# Patient Record
Sex: MALE | Race: WHITE | NOT HISPANIC OR LATINO | Employment: OTHER | URBAN - METROPOLITAN AREA
[De-identification: names, ages, dates, MRNs, and addresses within clinical notes are randomized per-mention and may not be internally consistent; named-entity substitution may affect disease eponyms.]

---

## 2023-05-23 ENCOUNTER — HOSPITAL ENCOUNTER (INPATIENT)
Facility: MEDICAL CENTER | Age: 68
LOS: 6 days | DRG: 246 | End: 2023-05-29
Attending: EMERGENCY MEDICINE | Admitting: INTERNAL MEDICINE
Payer: MEDICARE

## 2023-05-23 ENCOUNTER — APPOINTMENT (OUTPATIENT)
Dept: RADIOLOGY | Facility: MEDICAL CENTER | Age: 68
DRG: 246 | End: 2023-05-23
Attending: INTERNAL MEDICINE
Payer: MEDICARE

## 2023-05-23 ENCOUNTER — APPOINTMENT (OUTPATIENT)
Dept: CARDIOLOGY | Facility: MEDICAL CENTER | Age: 68
DRG: 246 | End: 2023-05-23
Attending: EMERGENCY MEDICINE
Payer: MEDICARE

## 2023-05-23 ENCOUNTER — APPOINTMENT (OUTPATIENT)
Dept: RADIOLOGY | Facility: MEDICAL CENTER | Age: 68
DRG: 246 | End: 2023-05-23
Attending: EMERGENCY MEDICINE
Payer: MEDICARE

## 2023-05-23 DIAGNOSIS — I46.9 CARDIAC ARREST (HCC): ICD-10-CM

## 2023-05-23 DIAGNOSIS — I25.5 ISCHEMIC CARDIOMYOPATHY: ICD-10-CM

## 2023-05-23 DIAGNOSIS — G89.29 OTHER CHRONIC PAIN: ICD-10-CM

## 2023-05-23 DIAGNOSIS — I48.0 PAROXYSMAL ATRIAL FIBRILLATION (HCC): ICD-10-CM

## 2023-05-23 DIAGNOSIS — F41.9 ANXIETY: ICD-10-CM

## 2023-05-23 DIAGNOSIS — E78.00 HYPERCHOLESTEREMIA: ICD-10-CM

## 2023-05-23 DIAGNOSIS — I47.20 VT (VENTRICULAR TACHYCARDIA) (HCC): ICD-10-CM

## 2023-05-23 DIAGNOSIS — I21.11 ST ELEVATION MYOCARDIAL INFARCTION INVOLVING RIGHT CORONARY ARTERY (HCC): ICD-10-CM

## 2023-05-23 LAB
ACT BLD: 203 SEC (ref 74–137)
ACT BLD: 305 SEC (ref 74–137)
ALBUMIN SERPL BCP-MCNC: 4.2 G/DL (ref 3.2–4.9)
ALBUMIN/GLOB SERPL: 1.6 G/DL
ALP SERPL-CCNC: 67 U/L (ref 30–99)
ALT SERPL-CCNC: 69 U/L (ref 2–50)
ANION GAP SERPL CALC-SCNC: 15 MMOL/L (ref 7–16)
ANION GAP SERPL CALC-SCNC: 19 MMOL/L (ref 7–16)
APTT PPP: 28.1 SEC (ref 24.7–36)
AST SERPL-CCNC: 50 U/L (ref 12–45)
BASE EXCESS BLDA CALC-SCNC: -1 MMOL/L (ref -4–3)
BASE EXCESS BLDA CALC-SCNC: 0 MMOL/L (ref -4–3)
BASOPHILS # BLD AUTO: 0 % (ref 0–1.8)
BASOPHILS # BLD: 0 K/UL (ref 0–0.12)
BILIRUB SERPL-MCNC: 0.2 MG/DL (ref 0.1–1.5)
BODY TEMPERATURE: ABNORMAL DEGREES
BODY TEMPERATURE: ABNORMAL DEGREES
BREATHS SETTING VENT: 24
BREATHS SETTING VENT: 28
BUN SERPL-MCNC: 19 MG/DL (ref 8–22)
BUN SERPL-MCNC: 20 MG/DL (ref 8–22)
CALCIUM ALBUM COR SERPL-MCNC: 8.7 MG/DL (ref 8.5–10.5)
CALCIUM SERPL-MCNC: 8.4 MG/DL (ref 8.5–10.5)
CALCIUM SERPL-MCNC: 8.9 MG/DL (ref 8.5–10.5)
CHLORIDE SERPL-SCNC: 101 MMOL/L (ref 96–112)
CHLORIDE SERPL-SCNC: 99 MMOL/L (ref 96–112)
CO2 BLDA-SCNC: 24 MMOL/L (ref 20–33)
CO2 BLDA-SCNC: 28 MMOL/L (ref 20–33)
CO2 SERPL-SCNC: 21 MMOL/L (ref 20–33)
CO2 SERPL-SCNC: 24 MMOL/L (ref 20–33)
CREAT SERPL-MCNC: 0.78 MG/DL (ref 0.5–1.4)
CREAT SERPL-MCNC: 0.79 MG/DL (ref 0.5–1.4)
DELSYS IDSYS: ABNORMAL
DELSYS IDSYS: ABNORMAL
END TIDAL CARBON DIOXIDE IECO2: 25 MMHG
END TIDAL CARBON DIOXIDE IECO2: 34 MMHG
EOSINOPHIL # BLD AUTO: 0 K/UL (ref 0–0.51)
EOSINOPHIL NFR BLD: 0 % (ref 0–6.9)
ERYTHROCYTE [DISTWIDTH] IN BLOOD BY AUTOMATED COUNT: 49.1 FL (ref 35.9–50)
EST. AVERAGE GLUCOSE BLD GHB EST-MCNC: 111 MG/DL
GFR SERPLBLD CREATININE-BSD FMLA CKD-EPI: 96 ML/MIN/1.73 M 2
GFR SERPLBLD CREATININE-BSD FMLA CKD-EPI: 96 ML/MIN/1.73 M 2
GLOBULIN SER CALC-MCNC: 2.6 G/DL (ref 1.9–3.5)
GLUCOSE BLD STRIP.AUTO-MCNC: 145 MG/DL (ref 65–99)
GLUCOSE SERPL-MCNC: 143 MG/DL (ref 65–99)
GLUCOSE SERPL-MCNC: 162 MG/DL (ref 65–99)
HBA1C MFR BLD: 5.5 % (ref 4–5.6)
HCO3 BLDA-SCNC: 23.1 MMOL/L (ref 17–25)
HCO3 BLDA-SCNC: 26.8 MMOL/L (ref 17–25)
HCT VFR BLD AUTO: 45.8 % (ref 42–52)
HGB BLD-MCNC: 15.5 G/DL (ref 14–18)
HOROWITZ INDEX BLDA+IHG-RTO: 295 MM[HG]
HOROWITZ INDEX BLDA+IHG-RTO: 360 MM[HG]
INR PPP: 1.05 (ref 0.87–1.13)
LIPASE SERPL-CCNC: 27 U/L (ref 11–82)
LYMPHOCYTES # BLD AUTO: 4.23 K/UL (ref 1–4.8)
LYMPHOCYTES NFR BLD: 18 % (ref 22–41)
MAGNESIUM SERPL-MCNC: 2.3 MG/DL (ref 1.5–2.5)
MANUAL DIFF BLD: NORMAL
MCH RBC QN AUTO: 32.5 PG (ref 27–33)
MCHC RBC AUTO-ENTMCNC: 33.8 G/DL (ref 32.3–36.5)
MCV RBC AUTO: 96 FL (ref 81.4–97.8)
MODE IMODE: ABNORMAL
MODE IMODE: ABNORMAL
MONOCYTES # BLD AUTO: 2.11 K/UL (ref 0–0.85)
MONOCYTES NFR BLD AUTO: 9 % (ref 0–13.4)
MORPHOLOGY BLD-IMP: NORMAL
MYELOCYTES NFR BLD MANUAL: 1 %
NEUTROPHILS # BLD AUTO: 16.92 K/UL (ref 1.82–7.42)
NEUTROPHILS NFR BLD: 70 % (ref 44–72)
NEUTS BAND NFR BLD MANUAL: 2 % (ref 0–10)
NRBC # BLD AUTO: 0.02 K/UL
NRBC BLD-RTO: 0.1 /100 WBC (ref 0–0.2)
NT-PROBNP SERPL IA-MCNC: 344 PG/ML (ref 0–125)
O2/TOTAL GAS SETTING VFR VENT: 40 %
O2/TOTAL GAS SETTING VFR VENT: 60 %
PCO2 BLDA: 31.4 MMHG (ref 26–37)
PCO2 BLDA: 54.8 MMHG (ref 26–37)
PCO2 TEMP ADJ BLDA: 30 MMHG (ref 26–37)
PCO2 TEMP ADJ BLDA: 52.8 MMHG (ref 26–37)
PEEP END EXPIRATORY PRESSURE IPEEP: 8 CMH20
PEEP END EXPIRATORY PRESSURE IPEEP: 8 CMH20
PH BLDA: 7.3 [PH] (ref 7.4–7.5)
PH BLDA: 7.48 [PH] (ref 7.4–7.5)
PH TEMP ADJ BLDA: 7.31 [PH] (ref 7.4–7.5)
PH TEMP ADJ BLDA: 7.49 [PH] (ref 7.4–7.5)
PHOSPHATE SERPL-MCNC: 5 MG/DL (ref 2.5–4.5)
PLATELET # BLD AUTO: 347 K/UL (ref 164–446)
PLATELET BLD QL SMEAR: NORMAL
PMV BLD AUTO: 9.4 FL (ref 9–12.9)
PO2 BLDA: 118 MMHG (ref 64–87)
PO2 BLDA: 216 MMHG (ref 64–87)
PO2 TEMP ADJ BLDA: 112 MMHG (ref 64–87)
PO2 TEMP ADJ BLDA: 212 MMHG (ref 64–87)
POTASSIUM SERPL-SCNC: 2.9 MMOL/L (ref 3.6–5.5)
POTASSIUM SERPL-SCNC: 3.3 MMOL/L (ref 3.6–5.5)
PROT SERPL-MCNC: 6.8 G/DL (ref 6–8.2)
PROTHROMBIN TIME: 13.6 SEC (ref 12–14.6)
RBC # BLD AUTO: 4.77 M/UL (ref 4.7–6.1)
RBC BLD AUTO: NORMAL
SAO2 % BLDA: 100 % (ref 93–99)
SAO2 % BLDA: 99 % (ref 93–99)
SODIUM SERPL-SCNC: 139 MMOL/L (ref 135–145)
SODIUM SERPL-SCNC: 140 MMOL/L (ref 135–145)
SPECIMEN DRAWN FROM PATIENT: ABNORMAL
SPECIMEN DRAWN FROM PATIENT: ABNORMAL
TIDAL VOLUME IVT: 470 ML
TIDAL VOLUME IVT: 470 ML
TROPONIN T SERPL-MCNC: 28 NG/L (ref 6–19)
WBC # BLD AUTO: 23.5 K/UL (ref 4.8–10.8)

## 2023-05-23 PROCEDURE — 84100 ASSAY OF PHOSPHORUS: CPT

## 2023-05-23 PROCEDURE — 99153 MOD SED SAME PHYS/QHP EA: CPT

## 2023-05-23 PROCEDURE — 03HY32Z INSERTION OF MONITORING DEVICE INTO UPPER ARTERY, PERCUTANEOUS APPROACH: ICD-10-PCS | Performed by: INTERNAL MEDICINE

## 2023-05-23 PROCEDURE — 700105 HCHG RX REV CODE 258: Performed by: EMERGENCY MEDICINE

## 2023-05-23 PROCEDURE — 5A1935Z RESPIRATORY VENTILATION, LESS THAN 24 CONSECUTIVE HOURS: ICD-10-PCS | Performed by: EMERGENCY MEDICINE

## 2023-05-23 PROCEDURE — 37799 UNLISTED PX VASCULAR SURGERY: CPT

## 2023-05-23 PROCEDURE — 36415 COLL VENOUS BLD VENIPUNCTURE: CPT

## 2023-05-23 PROCEDURE — A9270 NON-COVERED ITEM OR SERVICE: HCPCS

## 2023-05-23 PROCEDURE — 302214 INTUBATION BOX: Performed by: INTERNAL MEDICINE

## 2023-05-23 PROCEDURE — A9270 NON-COVERED ITEM OR SERVICE: HCPCS | Performed by: INTERNAL MEDICINE

## 2023-05-23 PROCEDURE — 92941 PRQ TRLML REVSC TOT OCCL AMI: CPT | Mod: RC | Performed by: INTERNAL MEDICINE

## 2023-05-23 PROCEDURE — 99285 EMERGENCY DEPT VISIT HI MDM: CPT

## 2023-05-23 PROCEDURE — 4A033BC MEASUREMENT OF ARTERIAL PRESSURE, CORONARY, PERCUTANEOUS APPROACH: ICD-10-PCS | Performed by: INTERNAL MEDICINE

## 2023-05-23 PROCEDURE — 770022 HCHG ROOM/CARE - ICU (200)

## 2023-05-23 PROCEDURE — 700101 HCHG RX REV CODE 250: Performed by: INTERNAL MEDICINE

## 2023-05-23 PROCEDURE — 84295 ASSAY OF SERUM SODIUM: CPT

## 2023-05-23 PROCEDURE — 93458 L HRT ARTERY/VENTRICLE ANGIO: CPT | Mod: 26,59 | Performed by: INTERNAL MEDICINE

## 2023-05-23 PROCEDURE — B2111ZZ FLUOROSCOPY OF MULTIPLE CORONARY ARTERIES USING LOW OSMOLAR CONTRAST: ICD-10-PCS | Performed by: INTERNAL MEDICINE

## 2023-05-23 PROCEDURE — 027034Z DILATION OF CORONARY ARTERY, ONE ARTERY WITH DRUG-ELUTING INTRALUMINAL DEVICE, PERCUTANEOUS APPROACH: ICD-10-PCS | Performed by: INTERNAL MEDICINE

## 2023-05-23 PROCEDURE — 700111 HCHG RX REV CODE 636 W/ 250 OVERRIDE (IP)

## 2023-05-23 PROCEDURE — 99291 CRITICAL CARE FIRST HOUR: CPT | Mod: 25 | Performed by: INTERNAL MEDICINE

## 2023-05-23 PROCEDURE — 700111 HCHG RX REV CODE 636 W/ 250 OVERRIDE (IP): Performed by: INTERNAL MEDICINE

## 2023-05-23 PROCEDURE — 5A12012 PERFORMANCE OF CARDIAC OUTPUT, SINGLE, MANUAL: ICD-10-PCS | Performed by: EMERGENCY MEDICINE

## 2023-05-23 PROCEDURE — 85730 THROMBOPLASTIN TIME PARTIAL: CPT

## 2023-05-23 PROCEDURE — 80053 COMPREHEN METABOLIC PANEL: CPT

## 2023-05-23 PROCEDURE — 700102 HCHG RX REV CODE 250 W/ 637 OVERRIDE(OP): Performed by: INTERNAL MEDICINE

## 2023-05-23 PROCEDURE — 94002 VENT MGMT INPAT INIT DAY: CPT

## 2023-05-23 PROCEDURE — 5A2204Z RESTORATION OF CARDIAC RHYTHM, SINGLE: ICD-10-PCS | Performed by: EMERGENCY MEDICINE

## 2023-05-23 PROCEDURE — 92978 ENDOLUMINL IVUS OCT C 1ST: CPT | Mod: 26,RC | Performed by: INTERNAL MEDICINE

## 2023-05-23 PROCEDURE — 84484 ASSAY OF TROPONIN QUANT: CPT

## 2023-05-23 PROCEDURE — 31500 INSERT EMERGENCY AIRWAY: CPT

## 2023-05-23 PROCEDURE — 83690 ASSAY OF LIPASE: CPT

## 2023-05-23 PROCEDURE — 700101 HCHG RX REV CODE 250

## 2023-05-23 PROCEDURE — 93005 ELECTROCARDIOGRAM TRACING: CPT

## 2023-05-23 PROCEDURE — 82803 BLOOD GASES ANY COMBINATION: CPT | Mod: 91

## 2023-05-23 PROCEDURE — 94003 VENT MGMT INPAT SUBQ DAY: CPT

## 2023-05-23 PROCEDURE — 02C03ZZ EXTIRPATION OF MATTER FROM CORONARY ARTERY, ONE ARTERY, PERCUTANEOUS APPROACH: ICD-10-PCS | Performed by: INTERNAL MEDICINE

## 2023-05-23 PROCEDURE — B240ZZ3 ULTRASONOGRAPHY OF SINGLE CORONARY ARTERY, INTRAVASCULAR: ICD-10-PCS | Performed by: INTERNAL MEDICINE

## 2023-05-23 PROCEDURE — 83735 ASSAY OF MAGNESIUM: CPT

## 2023-05-23 PROCEDURE — 36600 WITHDRAWAL OF ARTERIAL BLOOD: CPT

## 2023-05-23 PROCEDURE — 85007 BL SMEAR W/DIFF WBC COUNT: CPT

## 2023-05-23 PROCEDURE — 84132 ASSAY OF SERUM POTASSIUM: CPT

## 2023-05-23 PROCEDURE — 83036 HEMOGLOBIN GLYCOSYLATED A1C: CPT

## 2023-05-23 PROCEDURE — 700111 HCHG RX REV CODE 636 W/ 250 OVERRIDE (IP): Performed by: EMERGENCY MEDICINE

## 2023-05-23 PROCEDURE — B2151ZZ FLUOROSCOPY OF LEFT HEART USING LOW OSMOLAR CONTRAST: ICD-10-PCS | Performed by: INTERNAL MEDICINE

## 2023-05-23 PROCEDURE — 85347 COAGULATION TIME ACTIVATED: CPT

## 2023-05-23 PROCEDURE — 36620 INSERTION CATHETER ARTERY: CPT | Performed by: INTERNAL MEDICINE

## 2023-05-23 PROCEDURE — 700102 HCHG RX REV CODE 250 W/ 637 OVERRIDE(OP)

## 2023-05-23 PROCEDURE — 700101 HCHG RX REV CODE 250: Performed by: EMERGENCY MEDICINE

## 2023-05-23 PROCEDURE — 36620 INSERTION CATHETER ARTERY: CPT

## 2023-05-23 PROCEDURE — 83880 ASSAY OF NATRIURETIC PEPTIDE: CPT

## 2023-05-23 PROCEDURE — 700105 HCHG RX REV CODE 258: Performed by: INTERNAL MEDICINE

## 2023-05-23 PROCEDURE — 93005 ELECTROCARDIOGRAM TRACING: CPT | Performed by: INTERNAL MEDICINE

## 2023-05-23 PROCEDURE — 0BH17EZ INSERTION OF ENDOTRACHEAL AIRWAY INTO TRACHEA, VIA NATURAL OR ARTIFICIAL OPENING: ICD-10-PCS | Performed by: EMERGENCY MEDICINE

## 2023-05-23 PROCEDURE — 93005 ELECTROCARDIOGRAM TRACING: CPT | Performed by: EMERGENCY MEDICINE

## 2023-05-23 PROCEDURE — 92950 HEART/LUNG RESUSCITATION CPR: CPT

## 2023-05-23 PROCEDURE — 99223 1ST HOSP IP/OBS HIGH 75: CPT | Performed by: INTERNAL MEDICINE

## 2023-05-23 PROCEDURE — 94799 UNLISTED PULMONARY SVC/PX: CPT

## 2023-05-23 PROCEDURE — 71045 X-RAY EXAM CHEST 1 VIEW: CPT

## 2023-05-23 PROCEDURE — 82962 GLUCOSE BLOOD TEST: CPT

## 2023-05-23 PROCEDURE — 85610 PROTHROMBIN TIME: CPT

## 2023-05-23 PROCEDURE — 85025 COMPLETE CBC W/AUTO DIFF WBC: CPT

## 2023-05-23 PROCEDURE — 4A023N7 MEASUREMENT OF CARDIAC SAMPLING AND PRESSURE, LEFT HEART, PERCUTANEOUS APPROACH: ICD-10-PCS | Performed by: INTERNAL MEDICINE

## 2023-05-23 PROCEDURE — 82330 ASSAY OF CALCIUM: CPT

## 2023-05-23 PROCEDURE — 80048 BASIC METABOLIC PNL TOTAL CA: CPT

## 2023-05-23 RX ORDER — ROCURONIUM BROMIDE 10 MG/ML
100 INJECTION, SOLUTION INTRAVENOUS ONCE
Status: COMPLETED | OUTPATIENT
Start: 2023-05-23 | End: 2023-05-23

## 2023-05-23 RX ORDER — ASPIRIN 81 MG/1
81 TABLET, CHEWABLE ORAL DAILY
Status: DISCONTINUED | OUTPATIENT
Start: 2023-05-24 | End: 2023-05-25

## 2023-05-23 RX ORDER — HEPARIN SODIUM 1000 [USP'U]/ML
INJECTION, SOLUTION INTRAVENOUS; SUBCUTANEOUS
Status: COMPLETED
Start: 2023-05-23 | End: 2023-05-23

## 2023-05-23 RX ORDER — ONDANSETRON 2 MG/ML
4 INJECTION INTRAMUSCULAR; INTRAVENOUS EVERY 4 HOURS PRN
Status: DISCONTINUED | OUTPATIENT
Start: 2023-05-23 | End: 2023-05-29 | Stop reason: HOSPADM

## 2023-05-23 RX ORDER — LOSARTAN POTASSIUM 50 MG/1
100 TABLET ORAL DAILY
Status: ON HOLD | COMMUNITY
End: 2023-05-28

## 2023-05-23 RX ORDER — ATORVASTATIN CALCIUM 80 MG/1
80 TABLET, FILM COATED ORAL EVERY EVENING
Status: DISCONTINUED | OUTPATIENT
Start: 2023-05-23 | End: 2023-05-25

## 2023-05-23 RX ORDER — BISACODYL 10 MG
10 SUPPOSITORY, RECTAL RECTAL
Status: DISCONTINUED | OUTPATIENT
Start: 2023-05-23 | End: 2023-05-25

## 2023-05-23 RX ORDER — POTASSIUM CHLORIDE 7.45 MG/ML
10 INJECTION INTRAVENOUS
Status: COMPLETED | OUTPATIENT
Start: 2023-05-23 | End: 2023-05-24

## 2023-05-23 RX ORDER — ETOMIDATE 2 MG/ML
30 INJECTION INTRAVENOUS ONCE
Status: COMPLETED | OUTPATIENT
Start: 2023-05-23 | End: 2023-05-23

## 2023-05-23 RX ORDER — FAMOTIDINE 20 MG/1
20 TABLET, FILM COATED ORAL EVERY 12 HOURS
Status: DISCONTINUED | OUTPATIENT
Start: 2023-05-23 | End: 2023-05-25

## 2023-05-23 RX ORDER — LIDOCAINE HYDROCHLORIDE 20 MG/ML
INJECTION, SOLUTION INFILTRATION; PERINEURAL
Status: COMPLETED
Start: 2023-05-23 | End: 2023-05-23

## 2023-05-23 RX ORDER — HEPARIN SODIUM 200 [USP'U]/100ML
INJECTION, SOLUTION INTRAVENOUS
Status: COMPLETED
Start: 2023-05-23 | End: 2023-05-23

## 2023-05-23 RX ORDER — SODIUM CHLORIDE 9 MG/ML
INJECTION, SOLUTION INTRAVENOUS CONTINUOUS
Status: DISCONTINUED | OUTPATIENT
Start: 2023-05-23 | End: 2023-05-24

## 2023-05-23 RX ORDER — METHADONE HYDROCHLORIDE 5 MG/1
20 TABLET ORAL
Status: ON HOLD | COMMUNITY
End: 2023-05-28 | Stop reason: SDUPTHER

## 2023-05-23 RX ORDER — PRASUGREL 10 MG/1
10 TABLET, FILM COATED ORAL DAILY
Status: DISCONTINUED | OUTPATIENT
Start: 2023-05-24 | End: 2023-05-23

## 2023-05-23 RX ORDER — NOREPINEPHRINE BITARTRATE 0.03 MG/ML
0-1 INJECTION, SOLUTION INTRAVENOUS CONTINUOUS
Status: DISCONTINUED | OUTPATIENT
Start: 2023-05-23 | End: 2023-05-24

## 2023-05-23 RX ORDER — NOREPINEPHRINE BITARTRATE 1 MG/ML
INJECTION, SOLUTION INTRAVENOUS
Status: COMPLETED
Start: 2023-05-23 | End: 2023-05-23

## 2023-05-23 RX ORDER — MAGNESIUM SULFATE HEPTAHYDRATE 40 MG/ML
2 INJECTION, SOLUTION INTRAVENOUS ONCE
Status: COMPLETED | OUTPATIENT
Start: 2023-05-23 | End: 2023-05-23

## 2023-05-23 RX ORDER — PHENYLEPHRINE HCL IN 0.9% NACL 0.5 MG/5ML
100 SYRINGE (ML) INTRAVENOUS ONCE
Status: COMPLETED | OUTPATIENT
Start: 2023-05-23 | End: 2023-05-23

## 2023-05-23 RX ORDER — PRASUGREL 10 MG/1
60 TABLET, FILM COATED ORAL ONCE
Status: COMPLETED | OUTPATIENT
Start: 2023-05-23 | End: 2023-05-23

## 2023-05-23 RX ORDER — AMOXICILLIN 250 MG
2 CAPSULE ORAL 2 TIMES DAILY
Status: DISCONTINUED | OUTPATIENT
Start: 2023-05-23 | End: 2023-05-25

## 2023-05-23 RX ORDER — ONDANSETRON 4 MG/1
4 TABLET, ORALLY DISINTEGRATING ORAL EVERY 4 HOURS PRN
Status: DISCONTINUED | OUTPATIENT
Start: 2023-05-23 | End: 2023-05-24

## 2023-05-23 RX ORDER — ASPIRIN 81 MG/1
81 TABLET ORAL DAILY
Status: DISCONTINUED | OUTPATIENT
Start: 2023-05-24 | End: 2023-05-24

## 2023-05-23 RX ORDER — LIDOCAINE HYDROCHLORIDE 20 MG/ML
100 INJECTION, SOLUTION INTRAVENOUS ONCE
Status: COMPLETED | OUTPATIENT
Start: 2023-05-23 | End: 2023-05-23

## 2023-05-23 RX ORDER — PREDNISONE 1 MG/1
1 TABLET ORAL DAILY
Status: ON HOLD | COMMUNITY
End: 2023-05-24

## 2023-05-23 RX ORDER — EPTIFIBATIDE 2 MG/ML
INJECTION, SOLUTION INTRAVENOUS
Status: COMPLETED
Start: 2023-05-23 | End: 2023-05-23

## 2023-05-23 RX ORDER — VERAPAMIL HYDROCHLORIDE 2.5 MG/ML
INJECTION, SOLUTION INTRAVENOUS
Status: COMPLETED
Start: 2023-05-23 | End: 2023-05-23

## 2023-05-23 RX ORDER — POLYETHYLENE GLYCOL 3350 17 G/17G
1 POWDER, FOR SOLUTION ORAL
Status: DISCONTINUED | OUTPATIENT
Start: 2023-05-23 | End: 2023-05-25

## 2023-05-23 RX ORDER — HYDRALAZINE HYDROCHLORIDE 20 MG/ML
10 INJECTION INTRAMUSCULAR; INTRAVENOUS EVERY 4 HOURS PRN
Status: DISCONTINUED | OUTPATIENT
Start: 2023-05-23 | End: 2023-05-29 | Stop reason: HOSPADM

## 2023-05-23 RX ORDER — PRASUGREL 10 MG/1
10 TABLET, FILM COATED ORAL DAILY
Status: DISCONTINUED | OUTPATIENT
Start: 2023-05-24 | End: 2023-05-25

## 2023-05-23 RX ORDER — ENOXAPARIN SODIUM 100 MG/ML
40 INJECTION SUBCUTANEOUS DAILY
Status: DISCONTINUED | OUTPATIENT
Start: 2023-05-23 | End: 2023-05-27

## 2023-05-23 RX ORDER — DOXYCYCLINE HYCLATE 100 MG
100 TABLET ORAL 2 TIMES DAILY
Status: ON HOLD | COMMUNITY
End: 2023-05-24

## 2023-05-23 RX ORDER — HYDROCHLOROTHIAZIDE 25 MG/1
25 TABLET ORAL DAILY
Status: ON HOLD | COMMUNITY
End: 2023-05-25

## 2023-05-23 RX ORDER — AMIODARONE HYDROCHLORIDE 150 MG/3ML
INJECTION, SOLUTION INTRAVENOUS
Status: COMPLETED
Start: 2023-05-23 | End: 2023-05-23

## 2023-05-23 RX ORDER — LIDOCAINE HYDROCHLORIDE 20 MG/ML
INJECTION, SOLUTION INTRAVENOUS
Status: COMPLETED
Start: 2023-05-23 | End: 2023-05-23

## 2023-05-23 RX ORDER — PRASUGREL 10 MG/1
TABLET, FILM COATED ORAL
Status: COMPLETED
Start: 2023-05-23 | End: 2023-05-23

## 2023-05-23 RX ADMIN — SODIUM CHLORIDE: 9 INJECTION, SOLUTION INTRAVENOUS at 22:14

## 2023-05-23 RX ADMIN — PRASUGREL 60 MG: 10 TABLET, FILM COATED ORAL at 20:15

## 2023-05-23 RX ADMIN — ATORVASTATIN CALCIUM 80 MG: 80 TABLET, FILM COATED ORAL at 20:14

## 2023-05-23 RX ADMIN — EPTIFIBATIDE 40000 MCG: 2 INJECTION, SOLUTION INTRAVENOUS at 18:06

## 2023-05-23 RX ADMIN — ROCURONIUM BROMIDE 100 MG: 50 INJECTION, SOLUTION INTRAVENOUS at 16:42

## 2023-05-23 RX ADMIN — POTASSIUM CHLORIDE 10 MEQ: 7.46 INJECTION, SOLUTION INTRAVENOUS at 22:22

## 2023-05-23 RX ADMIN — ETOMIDATE 30 MG: 2 INJECTION, SOLUTION INTRAVENOUS at 16:42

## 2023-05-23 RX ADMIN — Medication 100 MCG: at 17:36

## 2023-05-23 RX ADMIN — FENTANYL CITRATE 100 MCG: 50 INJECTION, SOLUTION INTRAMUSCULAR; INTRAVENOUS at 19:19

## 2023-05-23 RX ADMIN — POTASSIUM CHLORIDE 10 MEQ: 7.46 INJECTION, SOLUTION INTRAVENOUS at 23:30

## 2023-05-23 RX ADMIN — ENOXAPARIN SODIUM 40 MG: 100 INJECTION SUBCUTANEOUS at 20:35

## 2023-05-23 RX ADMIN — HEPARIN SODIUM 7000 UNITS: 1000 INJECTION, SOLUTION INTRAVENOUS; SUBCUTANEOUS at 17:24

## 2023-05-23 RX ADMIN — NITROGLYCERIN 10 ML: 20 INJECTION INTRAVENOUS at 17:12

## 2023-05-23 RX ADMIN — LIDOCAINE HYDROCHLORIDE 100 MG: 20 INJECTION, SOLUTION INTRAVENOUS at 16:52

## 2023-05-23 RX ADMIN — LIDOCAINE HYDROCHLORIDE 100 MG: 20 INJECTION, SOLUTION INTRAVENOUS at 16:47

## 2023-05-23 RX ADMIN — VERAPAMIL HYDROCHLORIDE 2.5 MG: 2.5 INJECTION, SOLUTION INTRAVENOUS at 17:12

## 2023-05-23 RX ADMIN — PROPOFOL 30 MCG/KG/MIN: 10 INJECTION, EMULSION INTRAVENOUS at 17:00

## 2023-05-23 RX ADMIN — HEPARIN SODIUM 4000 UNITS: 1000 INJECTION, SOLUTION INTRAVENOUS; SUBCUTANEOUS at 17:55

## 2023-05-23 RX ADMIN — PRASUGREL 60 MG: 10 TABLET, FILM COATED ORAL at 18:30

## 2023-05-23 RX ADMIN — FENTANYL CITRATE 100 MCG: 50 INJECTION, SOLUTION INTRAMUSCULAR; INTRAVENOUS at 18:36

## 2023-05-23 RX ADMIN — POTASSIUM CHLORIDE 10 MEQ: 7.46 INJECTION, SOLUTION INTRAVENOUS at 20:35

## 2023-05-23 RX ADMIN — PROPOFOL 20 MCG/KG/MIN: 10 INJECTION, EMULSION INTRAVENOUS at 22:08

## 2023-05-23 RX ADMIN — LIDOCAINE HYDROCHLORIDE 100 MG: 20 INJECTION INTRAVENOUS at 18:08

## 2023-05-23 RX ADMIN — AMIODARONE HYDROCHLORIDE 150 MG: 50 INJECTION, SOLUTION INTRAVENOUS at 17:30

## 2023-05-23 RX ADMIN — MAGNESIUM SULFATE HEPTAHYDRATE 2 G: 2 INJECTION, SOLUTION INTRAVENOUS at 18:00

## 2023-05-23 RX ADMIN — LIDOCAINE HYDROCHLORIDE: 20 INJECTION, SOLUTION INFILTRATION; PERINEURAL at 17:12

## 2023-05-23 RX ADMIN — NOREPINEPHRINE BITARTRATE 0.5 MCG/KG/MIN: 1 INJECTION, SOLUTION, CONCENTRATE INTRAVENOUS at 21:30

## 2023-05-23 RX ADMIN — HEPARIN SODIUM 2000 UNITS: 200 INJECTION, SOLUTION INTRAVENOUS at 17:11

## 2023-05-23 RX ADMIN — HEPARIN SODIUM: 1000 INJECTION, SOLUTION INTRAVENOUS; SUBCUTANEOUS at 17:12

## 2023-05-23 ASSESSMENT — PAIN DESCRIPTION - PAIN TYPE
TYPE: ACUTE PAIN
TYPE: ACUTE PAIN

## 2023-05-23 NOTE — H&P
Critical Care H&P    Date of consult: 5/23/2023    Referring Physician  Justyn Grajeda MD    Reason for Consultation  Cardiac arrest, STEMI, respiratory failure    History of Presenting Illness  68 y.o. male with a past medical history of hypertension, hyperlipidemia, chronic pain who presented 5/23/2023 with ST elevation myocardial infarction which was complicated by a cardiac arrest in route to the hospital.  The patient arrived showing an inferior MI and subsequently went into VT which required 3 separate cardioversions and lidocaine 100 mg IV bolus x2.  He obtained ROSC and was noted to be neurologically intact therefore was intubated and the Cath Lab for PCI.  Taken to he underwent PCI with SABINO x1 to his RCA with SHADIA II flow.  He was noted to have akinesis of the entire inferior wall and elevated LV filling pressures consistent with decompensated heart failure.  He arrives in the ICU sedated on propofol, moving all 4 extremities purposefully and hypertensive.    The patient's wife states he takes multiple medications for hypertension, is on chronic methadone however she is unaware of dosing.    Code Status  Full Code    Review of Systems  Review of Systems   Unable to perform ROS: Critical illness       Past Medical History   has no past medical history on file.    Surgical History   has no past surgical history on file.    Family History  family history is not on file.    Social History       Medications  Home Medications    **Home medications have not yet been reviewed for this encounter**       Current Facility-Administered Medications   Medication Dose Route Frequency Provider Last Rate Last Admin    PROPOFOL 10 MG/ML IV EMUL              No current outpatient medications on file.       Allergies  Not on File    Vital Signs last 24 hours       Physical Exam  Physical Exam  Vitals and nursing note reviewed.   Constitutional:       General: He is in acute distress.      Appearance: He is well-developed. He is  ill-appearing.      Interventions: He is intubated.   HENT:      Head: Normocephalic and atraumatic.      Right Ear: External ear normal.      Left Ear: External ear normal.      Mouth/Throat:      Comments: 8.0 ET tube in place  Eyes:      Conjunctiva/sclera: Conjunctivae normal.      Pupils: Pupils are equal, round, and reactive to light.   Neck:      Vascular: No JVD.      Trachea: No tracheal deviation.   Cardiovascular:      Rate and Rhythm: Normal rate and regular rhythm.      Pulses: Normal pulses.   Pulmonary:      Effort: He is intubated.      Breath sounds: Rales present. No wheezing.   Abdominal:      General: Bowel sounds are normal. There is no distension.      Palpations: Abdomen is soft.   Musculoskeletal:         General: No tenderness.      Cervical back: Neck supple.      Comments: Right TR band in place   Skin:     General: Skin is warm and dry.      Capillary Refill: Capillary refill takes less than 2 seconds.      Findings: No rash.   Neurological:      Cranial Nerves: No cranial nerve deficit.      Sensory: No sensory deficit.      Motor: No weakness.      Comments: Moving all 4 extremities purposefully         Fluids  No intake or output data in the 24 hours ending 05/23/23 1920    Laboratory  Recent Results (from the past 48 hour(s))   Troponin    Collection Time: 05/23/23  4:45 PM   Result Value Ref Range    Troponin T 28 (H) 6 - 19 ng/L   proBrain Natriuretic Peptide, NT    Collection Time: 05/23/23  4:45 PM   Result Value Ref Range    NT-proBNP 344 (H) 0 - 125 pg/mL   CBC With Differential    Collection Time: 05/23/23  4:45 PM   Result Value Ref Range    WBC 23.5 (H) 4.8 - 10.8 K/uL    RBC 4.77 4.70 - 6.10 M/uL    Hemoglobin 15.5 14.0 - 18.0 g/dL    Hematocrit 45.8 42.0 - 52.0 %    MCV 96.0 81.4 - 97.8 fL    MCH 32.5 27.0 - 33.0 pg    MCHC 33.8 32.3 - 36.5 g/dL    RDW 49.1 35.9 - 50.0 fL    Platelet Count 347 164 - 446 K/uL    MPV 9.4 9.0 - 12.9 fL    Neutrophils-Polys 70.00 44.00 - 72.00  %    Lymphocytes 18.00 (L) 22.00 - 41.00 %    Monocytes 9.00 0.00 - 13.40 %    Eosinophils 0.00 0.00 - 6.90 %    Basophils 0.00 0.00 - 1.80 %    Nucleated RBC 0.10 0.00 - 0.20 /100 WBC    Neutrophils (Absolute) 16.92 (H) 1.82 - 7.42 K/uL    Lymphs (Absolute) 4.23 1.00 - 4.80 K/uL    Monos (Absolute) 2.11 (H) 0.00 - 0.85 K/uL    Eos (Absolute) 0.00 0.00 - 0.51 K/uL    Baso (Absolute) 0.00 0.00 - 0.12 K/uL    NRBC (Absolute) 0.02 K/uL   Comp Metabolic Panel    Collection Time: 05/23/23  4:45 PM   Result Value Ref Range    Sodium 139 135 - 145 mmol/L    Potassium 2.9 (L) 3.6 - 5.5 mmol/L    Chloride 99 96 - 112 mmol/L    Co2 21 20 - 33 mmol/L    Anion Gap 19.0 (H) 7.0 - 16.0    Glucose 162 (H) 65 - 99 mg/dL    Bun 19 8 - 22 mg/dL    Creatinine 0.79 0.50 - 1.40 mg/dL    Calcium 8.9 8.5 - 10.5 mg/dL    AST(SGOT) 50 (H) 12 - 45 U/L    ALT(SGPT) 69 (H) 2 - 50 U/L    Alkaline Phosphatase 67 30 - 99 U/L    Total Bilirubin 0.2 0.1 - 1.5 mg/dL    Albumin 4.2 3.2 - 4.9 g/dL    Total Protein 6.8 6.0 - 8.2 g/dL    Globulin 2.6 1.9 - 3.5 g/dL    A-G Ratio 1.6 g/dL   Lipase    Collection Time: 05/23/23  4:45 PM   Result Value Ref Range    Lipase 27 11 - 82 U/L   Prothrombin Time    Collection Time: 05/23/23  4:45 PM   Result Value Ref Range    PT 13.6 12.0 - 14.6 sec    INR 1.05 0.87 - 1.13   aPTT    Collection Time: 05/23/23  4:45 PM   Result Value Ref Range    APTT 28.1 24.7 - 36.0 sec   DIFFERENTIAL MANUAL    Collection Time: 05/23/23  4:45 PM   Result Value Ref Range    Bands-Stabs 2.00 0.00 - 10.00 %    Myelocytes 1.00 %    Manual Diff Status PERFORMED    PERIPHERAL SMEAR REVIEW    Collection Time: 05/23/23  4:45 PM   Result Value Ref Range    Peripheral Smear Review see below    PLATELET ESTIMATE    Collection Time: 05/23/23  4:45 PM   Result Value Ref Range    Plt Estimation Normal    MORPHOLOGY    Collection Time: 05/23/23  4:45 PM   Result Value Ref Range    RBC Morphology Normal    ESTIMATED GFR    Collection Time:  23  4:45 PM   Result Value Ref Range    GFR (CKD-EPI) 96 >60 mL/min/1.73 m 2   CORRECTED CALCIUM    Collection Time: 23  4:45 PM   Result Value Ref Range    Correct Calcium 8.7 8.5 - 10.5 mg/dL   POCT activated clotting time device results    Collection Time: 23  5:37 PM   Result Value Ref Range    Istat Activated Clotting Time 305 (H) 74 - 137 sec   POCT activated clotting time device results    Collection Time: 23  5:49 PM   Result Value Ref Range    Istat Activated Clotting Time 203 (H) 74 - 137 sec   POCT arterial blood gas device results    Collection Time: 23  6:53 PM   Result Value Ref Range    Ph 7.297 (LL) 7.400 - 7.500    Pco2 54.8 (HH) 26.0 - 37.0 mmHg    Po2 216 (H) 64 - 87 mmHg    Tco2 28 20 - 33 mmol/L    S02 100 (H) 93 - 99 %    Hco3 26.8 (H) 17.0 - 25.0 mmol/L    BE -1 -4 - 3 mmol/L    Body Temp 97.1 F degrees    O2 Therapy 60 %    iPF Ratio 360     Ph Temp Woody 7.309 (L) 7.400 - 7.500    Pco2 Temp Co 52.8 (HH) 26.0 - 37.0 mmHg    Po2 Temp Cor 212 (H) 64 - 87 mmHg    Specimen Arterial     DelSys Vent     End Tidal Carbon Dioxide 34 mmhg    Tidal Volume 470 mL    Peep End Expiratory Pressure 8 cmh20    Set Rate 24     Mode APV-CMV    EKG    Collection Time: 23  7:12 PM   Result Value Ref Range    Report       [facility]    Test Date:  2023  Pt Name:    KAL PRETTY                  Department:   MRN:        [id number]                      Room:       [facility]  Gender:     Male                         Technician: [facility]  :        [id number]                   Requested By:ALANA BOYER  Order #:    [id number]                    Reading MD:    Measurements  Intervals                                Axis  Rate:       96                           P:  LA:                                      QRS:        47  QRSD:       99                           T:          77  QT:         351  QTc:        444    Interpretive Statements  Atrial fibrillation  Inferior infarct, acute  (RCA)  Probable RV involvement, suggest recording right precordial leads  No previous ECG available for comparison         Imaging  DX-CHEST-PORTABLE (1 VIEW)   Final Result      1.  No acute cardiac or pulmonary abnormalities are identified.   2.  Endotracheal tube tip at the level of the clavicular heads      CL-LEFT HEART CATHETERIZATION WITH POSSIBLE INTERVENTION    (Results Pending)   DX-ABDOMEN FOR TUBE PLACEMENT    (Results Pending)   EC-ECHOCARDIOGRAM COMPLETE W/O CONT    (Results Pending)       Assessment/Plan  * STEMI (ST elevation myocardial infarction) ([facility])- (present on admission)  Assessment & Plan  Inferior wall MI with RCA occlusion, status post PCI x1  Echo  EKG  Statin, DAPT  Admit to cardiac ICU  TR band management    Hypokalemia- (present on admission)  Assessment & Plan  Replete to maintain >4  Check Mag    Hypercholesteremia- (present on admission)  Assessment & Plan  Check lipid panel  statin      HTN (hypertension)- (present on admission)  Assessment & Plan  Awaiting med rec  will avoid Beta-blocker at this time due to decompensated heart failure    Chronic pain- (present on admission)  Assessment & Plan  On methadone at home, awaiting dosing  Fentanyl available via ventilator sedation protocol    Cardiac arrest ([facility])- (present on admission)  Assessment & Plan  Neurologically intact postcardiac arrest  VT arrest due to RCA occlusion  Supportive care    Acute respiratory failure with hypoxia ([facility])- (present on admission)  Assessment & Plan  Intubated periarrest  RT/O2 protocols  Daily and PRN ABGs  Titration of ventilator therapy based on ABGs and patient's status  Sedation as tolerated/indicated  Daily CXR  HOB >30 degrees and peridex for VAP prevention  Pepcid for GI prophylaxis  SAT/SBT when able (ABCDEF Bundle)  Early mobility    VT (ventricular tachycardia) ([facility])- (present on admission)  Assessment & Plan  Received lidocaine x2 doses  Monitor for reperfusion arrhythmias  Telemetry  monitoring  optimize electrolytes        Discussed patient condition and risk of morbidity and/or mortality with Family, RN, RT, Pharmacy, cardiology, and ERP .      The patient remains critically ill.  Critical care time = 65 minutes in directly providing and coordinating critical care and extensive data review.  No time overlap and excludes procedures.

## 2023-05-23 NOTE — ED PROVIDER NOTES
ER Provider Note    Scribed for Justyn Grajeda M.d. by Adarsh Browning. 5/23/2023  4:52 PM    Primary Care Provider: No primary care provider noted.    CHIEF COMPLAINT  STEMI with 10/10 left sided chest pain    EXTERNAL RECORDS REVIEWED  EMS run sheet regarding on scene information and interventions en route and Other EMS EKG strip indicates inferior ST elevation MI.    HPI/ROS  LIMITATION TO HISTORY   Select: Altered mental status / Confusion and CPR in progress.  OUTSIDE HISTORIAN(S):  EMS provided entire history of present illness as detailed below.    Des Elam is a 68 y.o. male who presents to the ED via EMS as a code STEMI for evaluation of 10/10 left sided chest pain onset 1510 today. Per EMS, the patient was medicated with Aspirin 324 mg prior to their arrival. He received an additional Fentanyl 100 mcg and  mL en route. Per EMS, the patient was noted to be experiencing a STEMI with right sided inferior heart involvement. He was noted to go into pulseless ventricular tachycardia at 1632, after which CPR was initiated and he received a shock at 120 joules with return of spontaneous circulation. He has no prior medical history other than hypertension. He has no allergies to medications and takes no medication daily. He denied any recreational alcohol or drug use per EMS.     PAST MEDICAL HISTORY  No past medical history noted.    SURGICAL HISTORY  No past surgical history noted.    FAMILY HISTORY  No family history noted.    SOCIAL HISTORY   No pertinent social history noted    CURRENT MEDICATIONS  No current outpatient medications     ALLERGIES  Patient has no allergy information on record.    PHYSICAL EXAM  Pulse (!) 52   Resp (!) 23   Wt 113 kg (250 lb)   SpO2 97%    Constitutional: Alert in severe distress. Ill appearing. Intermittently speaking in complete sentences.   HENT: No signs of trauma, Bilateral external ears normal, Nose normal. Uvula midline.   Eyes: Pupils are equal and reactive,  Conjunctiva normal, Non-icteric.   Neck: Normal range of motion, No tenderness, Supple, No stridor.   Lymphatic: No lymphadenopathy noted.   Cardiovascular: Tachycardic, ST elevation noted, Intermittent 2+ femoral pulse.   Thorax & Lungs: Normal breath sounds, Respiratory distress, No wheezing, No chest tenderness.   Abdomen: Bowel sounds normal, Soft, No tenderness, No peritoneal signs, No masses, No pulsatile masses.   Skin: Warm, Dry, No erythema, No rash. Blue-beryl discoloration of upper chest.   Back: No bony tenderness, No CVA tenderness.   Extremities: Intermittently Intact distal pulses, No edema, No tenderness, No cyanosis.  Musculoskeletal: Good range of motion in all major joints. No tenderness to palpation or major deformities noted.   Neurologic: Alert , Normal motor function, Normal sensory function, No focal deficits noted.   Psychiatric: Affect normal, Judgment normal, Mood normal.     DIAGNOSTIC STUDIES    Labs:   Results for orders placed or performed during the hospital encounter of 05/23/23   Troponin   Result Value Ref Range    Troponin T 28 (H) 6 - 19 ng/L   proBrain Natriuretic Peptide, NT   Result Value Ref Range    NT-proBNP 344 (H) 0 - 125 pg/mL   CBC With Differential   Result Value Ref Range    WBC 23.5 (H) 4.8 - 10.8 K/uL    RBC 4.77 4.70 - 6.10 M/uL    Hemoglobin 15.5 14.0 - 18.0 g/dL    Hematocrit 45.8 42.0 - 52.0 %    MCV 96.0 81.4 - 97.8 fL    MCH 32.5 27.0 - 33.0 pg    MCHC 33.8 32.3 - 36.5 g/dL    RDW 49.1 35.9 - 50.0 fL    Platelet Count 347 164 - 446 K/uL    MPV 9.4 9.0 - 12.9 fL    Neutrophils-Polys 70.00 44.00 - 72.00 %    Lymphocytes 18.00 (L) 22.00 - 41.00 %    Monocytes 9.00 0.00 - 13.40 %    Eosinophils 0.00 0.00 - 6.90 %    Basophils 0.00 0.00 - 1.80 %    Nucleated RBC 0.10 0.00 - 0.20 /100 WBC    Neutrophils (Absolute) 16.92 (H) 1.82 - 7.42 K/uL    Lymphs (Absolute) 4.23 1.00 - 4.80 K/uL    Monos (Absolute) 2.11 (H) 0.00 - 0.85 K/uL    Eos (Absolute) 0.00 0.00 - 0.51 K/uL     Baso (Absolute) 0.00 0.00 - 0.12 K/uL    NRBC (Absolute) 0.02 K/uL   Comp Metabolic Panel   Result Value Ref Range    Sodium 139 135 - 145 mmol/L    Potassium 2.9 (L) 3.6 - 5.5 mmol/L    Chloride 99 96 - 112 mmol/L    Co2 21 20 - 33 mmol/L    Anion Gap 19.0 (H) 7.0 - 16.0    Glucose 162 (H) 65 - 99 mg/dL    Bun 19 8 - 22 mg/dL    Creatinine 0.79 0.50 - 1.40 mg/dL    Calcium 8.9 8.5 - 10.5 mg/dL    AST(SGOT) 50 (H) 12 - 45 U/L    ALT(SGPT) 69 (H) 2 - 50 U/L    Alkaline Phosphatase 67 30 - 99 U/L    Total Bilirubin 0.2 0.1 - 1.5 mg/dL    Albumin 4.2 3.2 - 4.9 g/dL    Total Protein 6.8 6.0 - 8.2 g/dL    Globulin 2.6 1.9 - 3.5 g/dL    A-G Ratio 1.6 g/dL   Lipase   Result Value Ref Range    Lipase 27 11 - 82 U/L   Prothrombin Time   Result Value Ref Range    PT 13.6 12.0 - 14.6 sec    INR 1.05 0.87 - 1.13   aPTT   Result Value Ref Range    APTT 28.1 24.7 - 36.0 sec   DIFFERENTIAL MANUAL   Result Value Ref Range    Bands-Stabs 2.00 0.00 - 10.00 %    Myelocytes 1.00 %    Manual Diff Status PERFORMED    PERIPHERAL SMEAR REVIEW   Result Value Ref Range    Peripheral Smear Review see below    PLATELET ESTIMATE   Result Value Ref Range    Plt Estimation Normal    MORPHOLOGY   Result Value Ref Range    RBC Morphology Normal    ESTIMATED GFR   Result Value Ref Range    GFR (CKD-EPI) 96 >60 mL/min/1.73 m 2   CORRECTED CALCIUM   Result Value Ref Range    Correct Calcium 8.7 8.5 - 10.5 mg/dL   HEMOGLOBIN A1C   Result Value Ref Range    Glycohemoglobin 5.5 4.0 - 5.6 %    Est Avg Glucose 111 mg/dL   MAGNESIUM   Result Value Ref Range    Magnesium 2.3 1.5 - 2.5 mg/dL   PHOSPHORUS   Result Value Ref Range    Phosphorus 5.0 (H) 2.5 - 4.5 mg/dL   Basic Metabolic Panel   Result Value Ref Range    Sodium 140 135 - 145 mmol/L    Potassium 3.3 (L) 3.6 - 5.5 mmol/L    Chloride 101 96 - 112 mmol/L    Co2 24 20 - 33 mmol/L    Glucose 143 (H) 65 - 99 mg/dL    Bun 20 8 - 22 mg/dL    Creatinine 0.78 0.50 - 1.40 mg/dL    Calcium 8.4 (L) 8.5 -  10.5 mg/dL    Anion Gap 15.0 7.0 - 16.0   ESTIMATED GFR   Result Value Ref Range    GFR (CKD-EPI) 96 >60 mL/min/1.73 m 2   EKG   Result Value Ref Range    Report       [facility]    Test Date:  2023  Pt Name:    KAL PRETTY                  Department: ER  MRN:        [id number]                      Room:       11  Gender:     Male                         Technician: [facility]  :        1955                   Requested By:ALANA BOYER  Order #:    [id number]                    Reading MD:    Measurements  Intervals                                Axis  Rate:       96                           P:  FL:                                      QRS:        47  QRSD:       99                           T:          77  QT:         351  QTc:        444    Interpretive Statements  Atrial fibrillation  Inferior infarct, acute (RCA)  Probable RV involvement, suggest recording right precordial leads  No previous ECG available for comparison     EKG - STAT Once   Result Value Ref Range    Report       [facility]    Test Date:  2023  Pt Name:    KAL PRETTY                  Department: 161  MRN:        [id number]                      Room:       11  Gender:     Male                         Technician: [facility]  :        1955                   Requested By:SHELBY LUGO JR  Order #:    [id number]                    Reading MD:    Measurements  Intervals                                Axis  Rate:       84                           P:          50  FL:         184                          QRS:        13  QRSD:       106                          T:          144  QT:         341  QTc:        404    Interpretive Statements  Sinus rhythm  Supraventricular bigeminy  Inferior infarct, acute (RCA)  Probable anterior infarct, age indeterminate  Probable RV involvement, suggest recording right precordial leads  Compared to ECG 2023 19:12:03  Atrial premature complex(es) now present  Atrial fibrillation no  longer present  Myocardial infarct finding still present     POCT activated clotting time device results   Result Value Ref Range    Istat Activated Clotting Time 305 (H) 74 - 137 sec   POCT activated clotting time device results   Result Value Ref Range    Istat Activated Clotting Time 203 (H) 74 - 137 sec   POCT arterial blood gas device results   Result Value Ref Range    Ph 7.297 (LL) 7.400 - 7.500    Pco2 54.8 (HH) 26.0 - 37.0 mmHg    Po2 216 (H) 64 - 87 mmHg    Tco2 28 20 - 33 mmol/L    S02 100 (H) 93 - 99 %    Hco3 26.8 (H) 17.0 - 25.0 mmol/L    BE -1 -4 - 3 mmol/L    Body Temp 97.1 F degrees    O2 Therapy 60 %    iPF Ratio 360     Ph Temp Woody 7.309 (L) 7.400 - 7.500    Pco2 Temp Co 52.8 (HH) 26.0 - 37.0 mmHg    Po2 Temp Cor 212 (H) 64 - 87 mmHg    Specimen Arterial     DelSys Vent     End Tidal Carbon Dioxide 34 mmhg    Tidal Volume 470 mL    Peep End Expiratory Pressure 8 cmh20    Set Rate 24     Mode APV-CMV    POCT glucose device results   Result Value Ref Range    POC Glucose, Blood 145 (H) 65 - 99 mg/dL   POCT arterial blood gas device results   Result Value Ref Range    Ph 7.476 7.400 - 7.500    Pco2 31.4 26.0 - 37.0 mmHg    Po2 118 (H) 64 - 87 mmHg    Tco2 24 20 - 33 mmol/L    S02 99 93 - 99 %    Hco3 23.1 17.0 - 25.0 mmol/L    BE 0 -4 - 3 mmol/L    Body Temp 96.8 F degrees    O2 Therapy 40 %    iPF Ratio 295     Ph Temp Woody 7.491 7.400 - 7.500    Pco2 Temp Co 30.0 26.0 - 37.0 mmHg    Po2 Temp Cor 112 (H) 64 - 87 mmHg    Specimen Arterial     DelSys Vent     End Tidal Carbon Dioxide 25 mmhg    Tidal Volume 470 mL    Peep End Expiratory Pressure 8 cmh20    Set Rate 28     Mode APV-CMV       EKG:   EMS Strip indicates inferior ST elevation. No EKG was obtained within the department.    Radiology:   The attending emergency physician has independently interpreted the diagnostic imaging associated with this visit and am waiting the final reading from the radiologist.   Preliminary interpretation is a  "follows: ETT needs to be advanced 1cm, this was communicated to RT  Radiologist interpretation:   DX-ABDOMEN FOR TUBE PLACEMENT   Final Result      Nasogastric tube tip terminates within the stomach.      DX-CHEST-PORTABLE (1 VIEW)   Final Result      1.  No acute cardiac or pulmonary abnormalities are identified.   2.  Endotracheal tube tip at the level of the clavicular heads      CL-LEFT HEART CATHETERIZATION WITH POSSIBLE INTERVENTION    (Results Pending)   EC-ECHOCARDIOGRAM COMPLETE W/O CONT    (Results Pending)   DX-CHEST-PORTABLE (1 VIEW)    (Results Pending)     PROCEDURES:  Procedure: I supervised CPR for multiple rounds.  Patient found to be pulseless and in cardiac arrest.  Please refer to nursing notes.    Cardioversion Procedure Note #1  Indication: pulseless ventricular tachycardia  Consent: Unable to be obtained due to patient's condition.  Pre-Medication: fentanyl 100 mcg intravenously via EMS  Procedure: The patient was placed in the supine position and the chest area was exposed. The cardioversion pads were applied in the standard manner and configuration.  Attempt #1: The defibrillator was set on the asynchronous mode and charged to 200 joules.  A charge was then delivered which resulted in change to sinus rhythm.  Attempt #2: Not necessary  Complications: None      Patient returned to sinus and then yelled \"ooohhh, why the f*& did you shock me\"    Cardioversion Procedure Note #2  Indication: Pulseless ventricular tachycardia  Consent: Unable to be obtained due to patient's condition.  Pre-Medication: None additional, see prior cardioversion and intubation procedure notes.  Procedure: The patient was placed already in the supine position and the chest area was exposed. The cardioversion pads were already applied in the standard manner and configuration.  Attempt #1: The defibrillator was set on the asynchronous mode and charged to 200 joules.  A charge was then delivered which resulted in change " to sinus rhythm.  Attempt #2: Not necessary  Complications: None    Conscious Sedation Procedure Note  Indication: cardioversion and intubation  Consent: Consent was unable to be obtained due to patient's condition.  Physician Involvement: The attending physician was present and supervising this procedure.  Pre-Sedation Documentation and Exam: Emergent evaluation  Airway Assessment: Emergent evaluation  Prior History of Anesthesia Complications: Unable to evaluate, emergent evaluation  ASA Classification: E Status - the procedure is performed on an Emergency basis  Sedation/ Anesthesia Plan: intravenous sedation  Medications Used: etomidate 30 mg intravenously, propofol 30 mcg/min intravenously, and rocuronium 100 mg intravenously.  Monitoring and Safety: The patient was placed on a cardiac monitor and vital signs, pulse oximetry and level of consciousness were continuously evaluated throughout the procedure. The patient was closely monitored until recovery from the medications was complete and the patient had returned to baseline status. Respiratory therapy was on standby at all times during the procedure.  (The following sections must be completed)  Post-Sedation Vital Signs: Vital signs were reviewed and were stable after the procedure (see flow sheet for vitals)  Intraservice Time: Greater than 10 minutes  Post-Sedation Exam: Lungs: clear to auscultation bilaterally  Complications: none  I provided both the sedation and procedure, a nurse was present at the bedside for the entire procedure.        Intubation Procedure Note  Indication: airway protection and multiple instances of CPR  Consent: Unable to be obtained due to patient's condition.  Medications Used: See conscious sedation procedure note.  Procedure: The patient was placed in the appropriate position.  Cricoid pressure was not required.  Intubation was performed video laryngoscopy using a glidescope a 7.0 cuffed endotracheal tube.  Initial attempt by  medical student was unsuccessful, and the patient was successfully intubated by myself. The cuff was then inflated and the tube was secured appropriately at a distance of 24 cm to the dental ridge.  Initial confirmation of placement included bilateral breath sounds, absence of sounds over the stomach, tube fogging, adequate chest rise, and adequate pulse oximetry reading.  A chest x-ray to verify correct placement of the tube showed appropriate tube position.  The patient tolerated the procedure well.   Complications: None     COURSE & MEDICAL DECISION MAKING     ED Observation Status? No; Patient does not meet criteria for ED Observation.     INITIAL ASSESSMENT, COURSE AND PLAN  Care Narrative: Critically ill 68-year-old male presents after V-tac arrest in the field with ROSC      4:30 PM - STEMI pre-Alert from EMS.    4:32 PM - Update from EMS en route indicating that CPR was initiated. The patient will be brought to [facility] than [facility]. Respiratory Therapy, Pharmacy, and Cardiology aware of change.    4:35 PM - Patient was evaluated at bedside. Per EMS, the patient achieved ROSC and is now alert.    4:38 PM - Patient placed on Defibrillator pads and monitors. The patient is noted to be in pulseless V-fib. CPR initiated as above and Cardioversion procedure #1 was performed as above.     4:39 PM - The patient is now awake. CPR paused. Pharmacy notified of the plan to administer etomidate and rocuronium for intubation procedure.    4:43 PM - The patient is noted to be in V-Fib at this time. Cardioversion procedure #2 performed as above.    4:44 PM - Etomidate and Rocuronium administered per conscious sedation procedure note, performed by myself as detailed above.    4:46 PM - Intubation procedure performed by medical student under my direct supervision as detailed above. Initial attempt was unsuccessful, and procedure was completed by myself. The patient was placed on a ventilator without  incident.    4:49 PM - The patient will be medicated with Lidocaine 100 mg at this time.    4:52 PM - Propofol drip initiated at this time. Transfer to cath lab pending cath lab staff and room readiness.    4:54 PM - The patient was noted to be in pulseless V-Tach again. CPR initiated as above and Cardioversion procedure #3 performed as above.    5:01 PM - The patient will be medicated with Magnesium Sulfate 2 g IV for his symptoms.    5:04 PM - The patient was transported to Cath lab on continuous monitoring. Intensivist at bedside.    The total critical care time on this patient is 40 minutes, resuscitating patient, speaking with admitting physician, and deciphering test results. This 40 minutes is exclusive of separately billable procedures.       DISPOSITION AND DISCUSSIONS  I have discussed management of the patient with the following physicians and COCO's:  Dr. Pena (Cardiology) and Dr. Cummings (Intensivist).    Discussion of management with other [facility] or appropriate source(s): Pharmacy regarding preparation of medications STAT for cardiac arrest protocol, RT at bedside for continuous airway monitoring during CPR and subsequent intubation procedures, and Social Work regarding contacting family members and arranging a location for the patient's wife to stay until stabilized      Barriers to care at this time, including but not limited to:  None .     Decision tools and prescription drugs considered including, but not limited to: HEART Score 5 .    DISPOSITION:  Patient will be hospitalized by Dr. Cummings (Intensivist) in critical condition.     FINAL DIANGOSIS  1. ST elevation myocardial infarction involving right coronary artery (HCC)    2. Cardiac arrest (HCC)    Total critical care time was 40 minutes, as detailed above.       Adarsh SYED (Jose R), am scribing for, and in the presence of, Prosper Cummings Jr., D.O..    Electronically signed by: Adarsh Browning (Jose R), 5/23/2023    Prosper SYED Jr., D.O.  personally performed the services described in this documentation, as scribed by Adarsh Browning in my presence, and it is both accurate and complete.      The note accurately reflects work and decisions made by me.  Justyn Grajeda M.D.  5/23/2023  10:43 PM

## 2023-05-24 ENCOUNTER — APPOINTMENT (OUTPATIENT)
Dept: RADIOLOGY | Facility: MEDICAL CENTER | Age: 68
DRG: 246 | End: 2023-05-24
Attending: HOSPITALIST
Payer: MEDICARE

## 2023-05-24 ENCOUNTER — HOSPITAL ENCOUNTER (OUTPATIENT)
Dept: RADIOLOGY | Facility: MEDICAL CENTER | Age: 68
End: 2023-05-24
Attending: INTERNAL MEDICINE

## 2023-05-24 ENCOUNTER — APPOINTMENT (OUTPATIENT)
Dept: CARDIOLOGY | Facility: MEDICAL CENTER | Age: 68
DRG: 246 | End: 2023-05-24
Attending: STUDENT IN AN ORGANIZED HEALTH CARE EDUCATION/TRAINING PROGRAM
Payer: MEDICARE

## 2023-05-24 LAB
ALBUMIN SERPL BCP-MCNC: 3.5 G/DL (ref 3.2–4.9)
ALBUMIN/GLOB SERPL: 1.7 G/DL
ALP SERPL-CCNC: 59 U/L (ref 30–99)
ALT SERPL-CCNC: 266 U/L (ref 2–50)
AMPHET UR QL SCN: NEGATIVE
ANION GAP SERPL CALC-SCNC: 12 MMOL/L (ref 7–16)
ANION GAP SERPL CALC-SCNC: 14 MMOL/L (ref 7–16)
ANION GAP SERPL CALC-SCNC: 16 MMOL/L (ref 7–16)
AST SERPL-CCNC: 739 U/L (ref 12–45)
BARBITURATES UR QL SCN: NEGATIVE
BASE EXCESS BLDA CALC-SCNC: 0 MMOL/L (ref -4–3)
BASOPHILS # BLD AUTO: 0.3 % (ref 0–1.8)
BASOPHILS # BLD: 0.06 K/UL (ref 0–0.12)
BENZODIAZ UR QL SCN: NEGATIVE
BILIRUB SERPL-MCNC: 0.5 MG/DL (ref 0.1–1.5)
BODY TEMPERATURE: NORMAL DEGREES
BREATHS SETTING VENT: 26
BUN SERPL-MCNC: 26 MG/DL (ref 8–22)
BUN SERPL-MCNC: 26 MG/DL (ref 8–22)
BUN SERPL-MCNC: 30 MG/DL (ref 8–22)
BZE UR QL SCN: NEGATIVE
CALCIUM ALBUM COR SERPL-MCNC: 9 MG/DL (ref 8.5–10.5)
CALCIUM SERPL-MCNC: 8.2 MG/DL (ref 8.5–10.5)
CALCIUM SERPL-MCNC: 8.3 MG/DL (ref 8.5–10.5)
CALCIUM SERPL-MCNC: 8.6 MG/DL (ref 8.5–10.5)
CANNABINOIDS UR QL SCN: NEGATIVE
CHLORIDE SERPL-SCNC: 101 MMOL/L (ref 96–112)
CHLORIDE SERPL-SCNC: 106 MMOL/L (ref 96–112)
CHLORIDE SERPL-SCNC: 99 MMOL/L (ref 96–112)
CHOLEST SERPL-MCNC: 167 MG/DL (ref 100–199)
CK SERPL-CCNC: 7065 U/L (ref 0–154)
CO2 BLDA-SCNC: 24 MMOL/L (ref 20–33)
CO2 SERPL-SCNC: 22 MMOL/L (ref 20–33)
CO2 SERPL-SCNC: 23 MMOL/L (ref 20–33)
CO2 SERPL-SCNC: 23 MMOL/L (ref 20–33)
CREAT SERPL-MCNC: 0.96 MG/DL (ref 0.5–1.4)
CREAT SERPL-MCNC: 1.24 MG/DL (ref 0.5–1.4)
CREAT SERPL-MCNC: 1.37 MG/DL (ref 0.5–1.4)
DELSYS IDSYS: NORMAL
EKG IMPRESSION: NORMAL
END TIDAL CARBON DIOXIDE IECO2: 25 MMHG
EOSINOPHIL # BLD AUTO: 0.07 K/UL (ref 0–0.51)
EOSINOPHIL NFR BLD: 0.3 % (ref 0–6.9)
ERYTHROCYTE [DISTWIDTH] IN BLOOD BY AUTOMATED COUNT: 49.8 FL (ref 35.9–50)
FENTANYL UR QL: POSITIVE
GFR SERPLBLD CREATININE-BSD FMLA CKD-EPI: 56 ML/MIN/1.73 M 2
GFR SERPLBLD CREATININE-BSD FMLA CKD-EPI: 63 ML/MIN/1.73 M 2
GFR SERPLBLD CREATININE-BSD FMLA CKD-EPI: 85 ML/MIN/1.73 M 2
GLOBULIN SER CALC-MCNC: 2.1 G/DL (ref 1.9–3.5)
GLUCOSE BLD STRIP.AUTO-MCNC: 130 MG/DL (ref 65–99)
GLUCOSE BLD STRIP.AUTO-MCNC: 77 MG/DL (ref 65–99)
GLUCOSE BLD STRIP.AUTO-MCNC: 95 MG/DL (ref 65–99)
GLUCOSE SERPL-MCNC: 109 MG/DL (ref 65–99)
GLUCOSE SERPL-MCNC: 153 MG/DL (ref 65–99)
GLUCOSE SERPL-MCNC: 155 MG/DL (ref 65–99)
HCO3 BLDA-SCNC: 22.8 MMOL/L (ref 17–25)
HCT VFR BLD AUTO: 40.4 % (ref 42–52)
HDLC SERPL-MCNC: 50 MG/DL
HGB BLD-MCNC: 13.7 G/DL (ref 14–18)
HOROWITZ INDEX BLDA+IHG-RTO: 277 MM[HG]
IMM GRANULOCYTES # BLD AUTO: 0.34 K/UL (ref 0–0.11)
IMM GRANULOCYTES NFR BLD AUTO: 1.5 % (ref 0–0.9)
LDLC SERPL CALC-MCNC: 83 MG/DL
LV EJECT FRACT  99904: 25
LV EJECT FRACT MOD 2C 99903: 40.92
LV EJECT FRACT MOD 4C 99902: 44.14
LV EJECT FRACT MOD BP 99901: 41.36
LYMPHOCYTES # BLD AUTO: 5.21 K/UL (ref 1–4.8)
LYMPHOCYTES NFR BLD: 23.5 % (ref 22–41)
MAGNESIUM SERPL-MCNC: 2.4 MG/DL (ref 1.5–2.5)
MCH RBC QN AUTO: 32.5 PG (ref 27–33)
MCHC RBC AUTO-ENTMCNC: 33.9 G/DL (ref 32.3–36.5)
MCV RBC AUTO: 95.7 FL (ref 81.4–97.8)
METHADONE UR QL SCN: POSITIVE
MODE IMODE: NORMAL
MONOCYTES # BLD AUTO: 1.44 K/UL (ref 0–0.85)
MONOCYTES NFR BLD AUTO: 6.5 % (ref 0–13.4)
NEUTROPHILS # BLD AUTO: 15.01 K/UL (ref 1.82–7.42)
NEUTROPHILS NFR BLD: 67.9 % (ref 44–72)
NRBC # BLD AUTO: 0 K/UL
NRBC BLD-RTO: 0 /100 WBC (ref 0–0.2)
O2/TOTAL GAS SETTING VFR VENT: 30 %
OPIATES UR QL SCN: NEGATIVE
OXYCODONE UR QL SCN: NEGATIVE
PCO2 BLDA: 31.9 MMHG (ref 26–37)
PCO2 TEMP ADJ BLDA: 31.3 MMHG (ref 26–37)
PCP UR QL SCN: NEGATIVE
PEEP END EXPIRATORY PRESSURE IPEEP: 8 CMH20
PH BLDA: 7.46 [PH] (ref 7.4–7.5)
PH TEMP ADJ BLDA: 7.47 [PH] (ref 7.4–7.5)
PHOSPHATE SERPL-MCNC: 4.8 MG/DL (ref 2.5–4.5)
PLATELET # BLD AUTO: 379 K/UL (ref 164–446)
PMV BLD AUTO: 9.8 FL (ref 9–12.9)
PO2 BLDA: 83 MMHG (ref 64–87)
PO2 TEMP ADJ BLDA: 81 MMHG (ref 64–87)
POTASSIUM SERPL-SCNC: 3.9 MMOL/L (ref 3.6–5.5)
POTASSIUM SERPL-SCNC: 4.2 MMOL/L (ref 3.6–5.5)
POTASSIUM SERPL-SCNC: 4.2 MMOL/L (ref 3.6–5.5)
PROPOXYPH UR QL SCN: NEGATIVE
PROT SERPL-MCNC: 5.6 G/DL (ref 6–8.2)
RBC # BLD AUTO: 4.22 M/UL (ref 4.7–6.1)
SAO2 % BLDA: 97 % (ref 93–99)
SODIUM SERPL-SCNC: 136 MMOL/L (ref 135–145)
SODIUM SERPL-SCNC: 139 MMOL/L (ref 135–145)
SODIUM SERPL-SCNC: 141 MMOL/L (ref 135–145)
SPECIMEN DRAWN FROM PATIENT: NORMAL
TIDAL VOLUME IVT: 470 ML
TRIGL SERPL-MCNC: 171 MG/DL (ref 0–149)
WBC # BLD AUTO: 22.1 K/UL (ref 4.8–10.8)

## 2023-05-24 PROCEDURE — 99223 1ST HOSP IP/OBS HIGH 75: CPT | Performed by: HOSPITALIST

## 2023-05-24 PROCEDURE — 94150 VITAL CAPACITY TEST: CPT

## 2023-05-24 PROCEDURE — 82550 ASSAY OF CK (CPK): CPT

## 2023-05-24 PROCEDURE — 94003 VENT MGMT INPAT SUBQ DAY: CPT

## 2023-05-24 PROCEDURE — 82803 BLOOD GASES ANY COMBINATION: CPT

## 2023-05-24 PROCEDURE — 99233 SBSQ HOSP IP/OBS HIGH 50: CPT | Performed by: INTERNAL MEDICINE

## 2023-05-24 PROCEDURE — 80307 DRUG TEST PRSMV CHEM ANLYZR: CPT

## 2023-05-24 PROCEDURE — 770000 HCHG ROOM/CARE - INTERMEDIATE ICU *

## 2023-05-24 PROCEDURE — 700102 HCHG RX REV CODE 250 W/ 637 OVERRIDE(OP): Performed by: HOSPITALIST

## 2023-05-24 PROCEDURE — 84100 ASSAY OF PHOSPHORUS: CPT

## 2023-05-24 PROCEDURE — 93010 ELECTROCARDIOGRAM REPORT: CPT | Performed by: INTERNAL MEDICINE

## 2023-05-24 PROCEDURE — 80061 LIPID PANEL: CPT

## 2023-05-24 PROCEDURE — 700111 HCHG RX REV CODE 636 W/ 250 OVERRIDE (IP): Performed by: INTERNAL MEDICINE

## 2023-05-24 PROCEDURE — 37799 UNLISTED PX VASCULAR SURGERY: CPT

## 2023-05-24 PROCEDURE — 93306 TTE W/DOPPLER COMPLETE: CPT | Mod: 26 | Performed by: INTERNAL MEDICINE

## 2023-05-24 PROCEDURE — A9270 NON-COVERED ITEM OR SERVICE: HCPCS | Performed by: STUDENT IN AN ORGANIZED HEALTH CARE EDUCATION/TRAINING PROGRAM

## 2023-05-24 PROCEDURE — 93005 ELECTROCARDIOGRAM TRACING: CPT | Performed by: INTERNAL MEDICINE

## 2023-05-24 PROCEDURE — 71045 X-RAY EXAM CHEST 1 VIEW: CPT

## 2023-05-24 PROCEDURE — 80053 COMPREHEN METABOLIC PANEL: CPT

## 2023-05-24 PROCEDURE — 93005 ELECTROCARDIOGRAM TRACING: CPT | Performed by: STUDENT IN AN ORGANIZED HEALTH CARE EDUCATION/TRAINING PROGRAM

## 2023-05-24 PROCEDURE — 700102 HCHG RX REV CODE 250 W/ 637 OVERRIDE(OP): Performed by: STUDENT IN AN ORGANIZED HEALTH CARE EDUCATION/TRAINING PROGRAM

## 2023-05-24 PROCEDURE — 83735 ASSAY OF MAGNESIUM: CPT

## 2023-05-24 PROCEDURE — 80048 BASIC METABOLIC PNL TOTAL CA: CPT

## 2023-05-24 PROCEDURE — 82962 GLUCOSE BLOOD TEST: CPT | Mod: 91

## 2023-05-24 PROCEDURE — 700117 HCHG RX CONTRAST REV CODE 255: Performed by: STUDENT IN AN ORGANIZED HEALTH CARE EDUCATION/TRAINING PROGRAM

## 2023-05-24 PROCEDURE — 700102 HCHG RX REV CODE 250 W/ 637 OVERRIDE(OP): Performed by: INTERNAL MEDICINE

## 2023-05-24 PROCEDURE — 93306 TTE W/DOPPLER COMPLETE: CPT

## 2023-05-24 PROCEDURE — A9270 NON-COVERED ITEM OR SERVICE: HCPCS | Performed by: INTERNAL MEDICINE

## 2023-05-24 PROCEDURE — 700105 HCHG RX REV CODE 258: Performed by: EMERGENCY MEDICINE

## 2023-05-24 PROCEDURE — 99291 CRITICAL CARE FIRST HOUR: CPT | Performed by: STUDENT IN AN ORGANIZED HEALTH CARE EDUCATION/TRAINING PROGRAM

## 2023-05-24 PROCEDURE — A9270 NON-COVERED ITEM OR SERVICE: HCPCS | Performed by: HOSPITALIST

## 2023-05-24 PROCEDURE — 85025 COMPLETE CBC W/AUTO DIFF WBC: CPT

## 2023-05-24 PROCEDURE — 94799 UNLISTED PULMONARY SVC/PX: CPT

## 2023-05-24 PROCEDURE — 700111 HCHG RX REV CODE 636 W/ 250 OVERRIDE (IP): Performed by: STUDENT IN AN ORGANIZED HEALTH CARE EDUCATION/TRAINING PROGRAM

## 2023-05-24 RX ORDER — METOPROLOL SUCCINATE 25 MG/1
25 TABLET, EXTENDED RELEASE ORAL
Status: DISCONTINUED | OUTPATIENT
Start: 2023-05-25 | End: 2023-05-24

## 2023-05-24 RX ORDER — METHADONE HYDROCHLORIDE 10 MG/1
20 TABLET ORAL 2 TIMES DAILY
Status: DISCONTINUED | OUTPATIENT
Start: 2023-05-24 | End: 2023-05-24

## 2023-05-24 RX ORDER — FUROSEMIDE 20 MG/1
20 TABLET ORAL
Status: DISCONTINUED | OUTPATIENT
Start: 2023-05-24 | End: 2023-05-24

## 2023-05-24 RX ORDER — DAPAGLIFLOZIN 10 MG/1
10 TABLET, FILM COATED ORAL DAILY
Status: DISCONTINUED | OUTPATIENT
Start: 2023-05-25 | End: 2023-05-25

## 2023-05-24 RX ORDER — FUROSEMIDE 10 MG/ML
20 INJECTION INTRAMUSCULAR; INTRAVENOUS
Status: DISCONTINUED | OUTPATIENT
Start: 2023-05-24 | End: 2023-05-24

## 2023-05-24 RX ORDER — LOSARTAN POTASSIUM 25 MG/1
25 TABLET ORAL
Status: DISCONTINUED | OUTPATIENT
Start: 2023-05-25 | End: 2023-05-25

## 2023-05-24 RX ORDER — ACETAMINOPHEN 325 MG/1
650 TABLET ORAL EVERY 4 HOURS PRN
Status: DISCONTINUED | OUTPATIENT
Start: 2023-05-24 | End: 2023-05-25

## 2023-05-24 RX ORDER — METHADONE HYDROCHLORIDE 10 MG/1
20 TABLET ORAL 2 TIMES DAILY
Status: DISCONTINUED | OUTPATIENT
Start: 2023-05-25 | End: 2023-05-25

## 2023-05-24 RX ORDER — METHADONE HYDROCHLORIDE 10 MG/ML
10 CONCENTRATE ORAL 4 TIMES DAILY
Status: DISCONTINUED | OUTPATIENT
Start: 2023-05-24 | End: 2023-05-24

## 2023-05-24 RX ORDER — POTASSIUM CHLORIDE 20 MEQ/1
10 TABLET, EXTENDED RELEASE ORAL ONCE
Status: COMPLETED | OUTPATIENT
Start: 2023-05-24 | End: 2023-05-24

## 2023-05-24 RX ORDER — ATORVASTATIN CALCIUM 20 MG/1
100 TABLET, FILM COATED ORAL NIGHTLY
Status: ON HOLD | COMMUNITY
End: 2023-05-25

## 2023-05-24 RX ORDER — FUROSEMIDE 10 MG/ML
20 INJECTION INTRAMUSCULAR; INTRAVENOUS
Status: DISCONTINUED | OUTPATIENT
Start: 2023-05-25 | End: 2023-05-26

## 2023-05-24 RX ORDER — OXYCODONE HYDROCHLORIDE 5 MG/1
5 TABLET ORAL EVERY 6 HOURS PRN
Status: DISCONTINUED | OUTPATIENT
Start: 2023-05-24 | End: 2023-05-25

## 2023-05-24 RX ORDER — LOSARTAN POTASSIUM 25 MG/1
25 TABLET ORAL
Status: DISCONTINUED | OUTPATIENT
Start: 2023-05-25 | End: 2023-05-24

## 2023-05-24 RX ORDER — ONDANSETRON 4 MG/1
4 TABLET, ORALLY DISINTEGRATING ORAL EVERY 4 HOURS PRN
Status: DISCONTINUED | OUTPATIENT
Start: 2023-05-24 | End: 2023-05-25

## 2023-05-24 RX ADMIN — ATORVASTATIN CALCIUM 80 MG: 80 TABLET, FILM COATED ORAL at 17:08

## 2023-05-24 RX ADMIN — FENTANYL CITRATE 100 MCG: 50 INJECTION, SOLUTION INTRAMUSCULAR; INTRAVENOUS at 07:22

## 2023-05-24 RX ADMIN — POTASSIUM CHLORIDE 10 MEQ: 1500 TABLET, EXTENDED RELEASE ORAL at 08:24

## 2023-05-24 RX ADMIN — FAMOTIDINE 20 MG: 20 TABLET, FILM COATED ORAL at 05:25

## 2023-05-24 RX ADMIN — FENTANYL CITRATE 100 MCG: 50 INJECTION, SOLUTION INTRAMUSCULAR; INTRAVENOUS at 06:28

## 2023-05-24 RX ADMIN — OXYCODONE 5 MG: 5 TABLET ORAL at 08:23

## 2023-05-24 RX ADMIN — SENNOSIDES AND DOCUSATE SODIUM 2 TABLET: 50; 8.6 TABLET ORAL at 05:25

## 2023-05-24 RX ADMIN — POTASSIUM CHLORIDE 10 MEQ: 7.46 INJECTION, SOLUTION INTRAVENOUS at 00:38

## 2023-05-24 RX ADMIN — HUMAN ALBUMIN MICROSPHERES AND PERFLUTREN 3 ML: 10; .22 INJECTION, SOLUTION INTRAVENOUS at 14:15

## 2023-05-24 RX ADMIN — METHADONE HYDROCHLORIDE 20 MG: 10 TABLET ORAL at 17:08

## 2023-05-24 RX ADMIN — METHADONE HYDROCHLORIDE 10 MG: 10 CONCENTRATE ORAL at 09:47

## 2023-05-24 RX ADMIN — ACETAMINOPHEN 650 MG: 325 TABLET, FILM COATED ORAL at 19:33

## 2023-05-24 RX ADMIN — METHADONE HYDROCHLORIDE 10 MG: 10 CONCENTRATE ORAL at 12:18

## 2023-05-24 RX ADMIN — SODIUM CHLORIDE: 9 INJECTION, SOLUTION INTRAVENOUS at 05:44

## 2023-05-24 RX ADMIN — FUROSEMIDE 20 MG: 10 INJECTION, SOLUTION INTRAVENOUS at 13:05

## 2023-05-24 RX ADMIN — ENOXAPARIN SODIUM 40 MG: 100 INJECTION SUBCUTANEOUS at 17:08

## 2023-05-24 RX ADMIN — FAMOTIDINE 20 MG: 20 TABLET, FILM COATED ORAL at 17:08

## 2023-05-24 RX ADMIN — PROPOFOL 20 MCG/KG/MIN: 10 INJECTION, EMULSION INTRAVENOUS at 04:32

## 2023-05-24 RX ADMIN — SENNOSIDES AND DOCUSATE SODIUM 2 TABLET: 50; 8.6 TABLET ORAL at 17:08

## 2023-05-24 RX ADMIN — PRASUGREL 10 MG: 10 TABLET, FILM COATED ORAL at 05:26

## 2023-05-24 RX ADMIN — ASPIRIN 81 MG 81 MG: 81 TABLET ORAL at 05:25

## 2023-05-24 ASSESSMENT — PATIENT HEALTH QUESTIONNAIRE - PHQ9
2. FEELING DOWN, DEPRESSED, IRRITABLE, OR HOPELESS: NOT AT ALL
1. LITTLE INTEREST OR PLEASURE IN DOING THINGS: NOT AT ALL
SUM OF ALL RESPONSES TO PHQ9 QUESTIONS 1 AND 2: 0

## 2023-05-24 ASSESSMENT — COGNITIVE AND FUNCTIONAL STATUS - GENERAL
DRESSING REGULAR LOWER BODY CLOTHING: A LITTLE
STANDING UP FROM CHAIR USING ARMS: A LOT
DAILY ACTIVITIY SCORE: 21
WALKING IN HOSPITAL ROOM: A LOT
HELP NEEDED FOR BATHING: A LITTLE
DRESSING REGULAR LOWER BODY CLOTHING: A LITTLE
SUGGESTED CMS G CODE MODIFIER MOBILITY: CK
STANDING UP FROM CHAIR USING ARMS: A LITTLE
HELP NEEDED FOR BATHING: A LITTLE
WALKING IN HOSPITAL ROOM: A LOT
SUGGESTED CMS G CODE MODIFIER MOBILITY: CK
CLIMB 3 TO 5 STEPS WITH RAILING: A LOT
SUGGESTED CMS G CODE MODIFIER DAILY ACTIVITY: CJ
CLIMB 3 TO 5 STEPS WITH RAILING: A LOT
TOILETING: A LITTLE
SUGGESTED CMS G CODE MODIFIER DAILY ACTIVITY: CJ
TURNING FROM BACK TO SIDE WHILE IN FLAT BAD: A LITTLE
DAILY ACTIVITIY SCORE: 21
MOVING FROM LYING ON BACK TO SITTING ON SIDE OF FLAT BED: A LITTLE
MOVING FROM LYING ON BACK TO SITTING ON SIDE OF FLAT BED: A LITTLE
MOBILITY SCORE: 15
MOVING TO AND FROM BED TO CHAIR: A LITTLE
MOBILITY SCORE: 18
TOILETING: A LITTLE

## 2023-05-24 ASSESSMENT — ENCOUNTER SYMPTOMS
CHILLS: 0
COUGH: 1
PALPITATIONS: 0
FEVER: 0
MUSCULOSKELETAL NEGATIVE: 1
COUGH: 0
FOCAL WEAKNESS: 0
GASTROINTESTINAL NEGATIVE: 1
SHORTNESS OF BREATH: 0
VOMITING: 0
EYES NEGATIVE: 1
SPUTUM PRODUCTION: 1
NEUROLOGICAL NEGATIVE: 1
SHORTNESS OF BREATH: 1
NERVOUS/ANXIOUS: 1
NAUSEA: 0

## 2023-05-24 ASSESSMENT — PULMONARY FUNCTION TESTS: FVC: 2.8

## 2023-05-24 ASSESSMENT — LIFESTYLE VARIABLES
TOTAL SCORE: 0
CONSUMPTION TOTAL: NEGATIVE
HOW MANY TIMES IN THE PAST YEAR HAVE YOU HAD 5 OR MORE DRINKS IN A DAY: 0
EVER FELT BAD OR GUILTY ABOUT YOUR DRINKING: NO
HAVE PEOPLE ANNOYED YOU BY CRITICIZING YOUR DRINKING: NO
AVERAGE NUMBER OF DAYS PER WEEK YOU HAVE A DRINK CONTAINING ALCOHOL: 0
ON A TYPICAL DAY WHEN YOU DRINK ALCOHOL HOW MANY DRINKS DO YOU HAVE: 0
HAVE YOU EVER FELT YOU SHOULD CUT DOWN ON YOUR DRINKING: NO
DOES PATIENT WANT TO STOP DRINKING: NO
TOTAL SCORE: 0
TOTAL SCORE: 0
EVER HAD A DRINK FIRST THING IN THE MORNING TO STEADY YOUR NERVES TO GET RID OF A HANGOVER: NO
ALCOHOL_USE: NO

## 2023-05-24 ASSESSMENT — PAIN DESCRIPTION - PAIN TYPE
TYPE: ACUTE PAIN
TYPE: CHRONIC PAIN
TYPE: CHRONIC PAIN
TYPE: ACUTE PAIN
TYPE: CHRONIC PAIN

## 2023-05-24 ASSESSMENT — FIBROSIS 4 INDEX
FIB4 SCORE: 8.25
FIB4 SCORE: 1.1

## 2023-05-24 ASSESSMENT — COPD QUESTIONNAIRES
HAVE YOU SMOKED AT LEAST 100 CIGARETTES IN YOUR ENTIRE LIFE: YES
DO YOU EVER COUGH UP ANY MUCUS OR PHLEGM?: NO/ONLY WITH OCCASIONAL COLDS OR INFECTIONS
COPD SCREENING SCORE: 4
DURING THE PAST 4 WEEKS HOW MUCH DID YOU FEEL SHORT OF BREATH: NONE/LITTLE OF THE TIME

## 2023-05-24 NOTE — DISCHARGE PLANNING
STEMI    Pt arrived via [facility] as a STEMI. Pt was staying at the [facility], his wife arrived with ambulance.     Pt is Des Elam   1955    Pt wife is Vika Elam- [phone]    SW stayed with Pt wife , support offered. Pt wife was updated by Cardiology .   Pt was taken to the CATH Lab and Pt wife is in the waiting room. SW will continue to provide support as needed

## 2023-05-24 NOTE — CONSULTS
Cardiology Consult Note:    Emil Pena M.D.  Date & Time note created:    5/23/2023   5:07 PM     Referring MD:  Dr. Grajeda    Patient ID:   Name:             Des Elam     YOB: 1955  Age:                 68 y.o.  male   MRN:               [id number]                                                             Chief Complaint / Reason for consult:  STEMI, V fib arrest.    History of Present Illness:    This is a 68 years old man without prior cardiac problems, presented to the hospital via EMS after he was found to be in cardiac arrest with ventricular fibrillation, CPR was performed, defibrillation was performed, patient was noted to have ST elevation on inferior leads with reciprocal changes on field EKG.  Patient presented to the hospital with ongoing ventricular arrhythmias.  He was defibrillated twice in the ER.  He was intubated in the ER.  He did have good mentation when he was awake after defibrillation.  I personally interpreted the EKG tracing on field which showed inferior ST elevation with reciprocal changes suggestive of inferior myocardial infarct acute events.  Per his wife's report, he does not have prior history of cardiac problems, no prior cardiac procedure or surgery.  Patient does have hypertension problem.    In the ER, patient goes in and out of ventricular arrhythmias.  He received CPR, lidocaine loading, magnesium loading and defibrillation x2 in the ER.  He was never hypotensive.    Review of Systems:      Sedated and intubated.               Past Medical History:   No past medical history on file.  Active Hospital Problems    Diagnosis     STEMI (ST elevation myocardial infarction) ([facility]) [I21.3]        Past Surgical History:  No past surgical history on file.    Hospital Medications:    Current Facility-Administered Medications:     PROPOFOL 10 MG/ML IV EMUL, , , ,     NS infusion, , Intravenous, Continuous, Justyn Grajeda M.D.    Respiratory Therapy Consult, ,  Nebulization, Continuous RT, Prosper Cummings Jr., D.O.    famotidine (PEPCID) tablet 20 mg, 20 mg, Enteral Tube, Q12HRS **OR** famotidine (PEPCID) injection 20 mg, 20 mg, Intravenous, Q12HRS, GABRIEL Quarles Jr.O.    senna-docusate (PERICOLACE or SENOKOT S) 8.6-50 MG per tablet 2 Tablet, 2 Tablet, Enteral Tube, BID **AND** polyethylene glycol/lytes (MIRALAX) PACKET 1 Packet, 1 Packet, Enteral Tube, QDAY PRN **AND** magnesium hydroxide (MILK OF MAGNESIA) suspension 30 mL, 30 mL, Enteral Tube, QDAY PRN **AND** bisacodyl (DULCOLAX) suppository 10 mg, 10 mg, Rectal, QDAY PRN, Prosper Cummings Jr., D.O.    MD Alert...ICU Electrolyte Replacement per Pharmacy, , Other, PHARMACY TO DOSE, Prosper Cummings Jr., D.O.    lidocaine (XYLOCAINE) 1 % injection 2 mL, 2 mL, Tracheal Tube, Q30 MIN PRN, Prosper Cummings Jr., D.O.    fentaNYL (SUBLIMAZE) injection 100 mcg, 100 mcg, Intravenous, Q15 MIN PRN **AND** fentaNYL (SUBLIMAZE) injection 200 mcg, 200 mcg, Intravenous, Q15 MIN PRN **AND** fentaNYL (SUBLIMAZE) 50 mcg/mL in 50mL (Continuous Infusion), , Intravenous, Continuous **AND** propofol (DIPRIVAN) injection, 0-80 mcg/kg/min, Intravenous, Continuous **AND** [START ON 5/24/2023] Triglyceride, , , Every 3 Days (0300), Prosper Cummings Jr., D.O.    AMIODARONE HCL IN DEXTROSE 150-4.21 MG/100ML-% IV SOLN, , , ,     amiodarone (NEXTERONE) IVPB 150 mg, 150 mg, Intravenous, Once, Justyn Grajeda M.D.    VERAPAMIL HCL 2.5 MG/ML IV SOLN, , , ,     HEPARIN SODIUM (PORCINE) 1000 UNIT/ML INJ SOLN, , , ,     LIDOCAINE HCL 2 % INJ SOLN, , , ,     HEPARIN (PORCINE) IN NACL 2000-0.9 UNIT/L-% IV SOLN, , , ,     NITROGLYCERIN 2 MG IV SOLN, , , ,   No current outpatient medications on file.    Current Outpatient Medications:  (Not in a hospital admission)      Medication Allergy:  Not on File    Family History:  No family history on file.    Social History:  Social History     Socioeconomic History    Marital status: Not on file     Spouse name:  Not on file    Number of children: Not on file    Years of education: Not on file    Highest education level: Not on file   Occupational History    Not on file   Tobacco Use    Smoking status: Not on file    Smokeless tobacco: Not on file   Substance and Sexual Activity    Alcohol use: Not on file    Drug use: Not on file    Sexual activity: Not on file   Other Topics Concern    Not on file   Social History Narrative    Not on file     Social Determinants of Health     Financial Resource Strain: Not on file   Food Insecurity: Not on file   Transportation Needs: Not on file   Physical Activity: Not on file   Stress: Not on file   Social Connections: Not on file   Intimate Partner Violence: Not on file   Housing Stability: Not on file         Physical Exam:  Vitals/ General Appearance:   Weight/BMI: There is no height or weight on file to calculate BMI.  Wt 113 kg (250 lb)   Vitals:    05/23/23 1658   Weight: 113 kg (250 lb)     Oxygen Therapy:       Constitutional:   Intubated status  HENMT:  Trach tube is in place properly  Eyes: No discharge.  Neck:  Unable to assess for JVD due to intubated status  Cardiovascular: fast heart rate, irregularly irregular rhythm.   Extremities with no edema.  Lungs:  +ventilated status  Abdomen: no distention  Skin: Warm  Neurologic: intubated status  Psychiatric: intubated status        MDM (Data Review):     Records reviewed and summarized in current documentation    Lab Data Review:  No results found for this or any previous visit (from the past 24 hour(s)).    Imaging/Procedures Review:    Chest Xray:  Reviewed    EKG:   As in HPI.     MDM (Assessment and Plan):     Active Hospital Problems    Diagnosis     STEMI (ST elevation myocardial infarction) ([facility]) [I21.3]          At this time, patient is having an revolving acute myocardial infarction.  Patient is critically ill.  I explained to patient the need for an emergent cardiac catheterization to further define his coronary  anatomy along with possible percutaneous treatment to reduce cardiovascular mortality.  I explained to patient the risk and the benefits.  Patient's wife has agreed to proceed with emergency cardiac catheterization.    I spent 45 minutes of critical care time during the consultation and treatment of this patient's complex and critically ill medical issues without overlapping with other physician's care.      Thank you for referring this patient to our cardiology service.  We will follow patient with you.      Emil Pena MD.   Cardiology Inpatient Service.  [facility].  [phone].  Bibiana Matt.

## 2023-05-24 NOTE — PROGRESS NOTES
Monitor Summary     SR with bigem PAC 60-80, F PVC, Afib, runs of junctional, runs of VTACH, bundle branch block    .18/.11/.43

## 2023-05-24 NOTE — CARE PLAN
The patient is Watcher - Medium risk of patient condition declining or worsening    Shift Goals  Clinical Goals: extubate, stable hemodynamics, diet  Patient Goals: take tubes out  Family Goals: updates    Progress made toward(s) clinical / shift goals:    Problem: Knowledge Deficit - Standard  Goal: Patient and family/care givers will demonstrate understanding of plan of care, disease process/condition, diagnostic tests and medications  Outcome: Progressing     Problem: Pain - Standard  Goal: Alleviation of pain or a reduction in pain to the patient’s comfort goal  Outcome: Progressing   See MAR, home methadone added in addition to PRN regimen  Problem: Skin Integrity  Goal: Skin integrity is maintained or improved  Outcome: Progressing     Problem: Fall Risk  Goal: Patient will remain free from falls  Outcome: Progressing     Problem: Safety - Medical Restraint  Goal: Remains free of injury from restraints (Restraint for Interference with Medical Device)  Outcome: Progressing  Goal: Free from restraint(s) (Restraint for Interference with Medical Device)  Outcome: Progressing   Removed at time of extubation    Patient is not progressing towards the following goals:

## 2023-05-24 NOTE — ASSESSMENT & PLAN NOTE
Holding home antihypertensives as we're weaning levophed - once stable, can be restarted and titrated appropriately

## 2023-05-24 NOTE — ASSESSMENT & PLAN NOTE
Intubated periarrest  Extubated on 5/24 following cath and now on NC - wean as tolerated  Pulmonary toilet, monitor  Forced diuresis, the patient with some degree of orthopnea

## 2023-05-24 NOTE — PROGRESS NOTES
Notified Dr. Ferreira about patient having 9 beats of V-tach. Also notified MD about decreased UOP from 55ml for 2 hours to 35 ml in past 2 hours. No Orders Received.

## 2023-05-24 NOTE — ASSESSMENT & PLAN NOTE
S/p cardioversion on 5/23 multiple times - had one episode of NSVT for 30 seconds today w/o hemodynamic compromise.    - optimized lytes  - monitor  - consider antiarrhythmics if recurrent episodes

## 2023-05-24 NOTE — PROGRESS NOTES
4 Eyes Skin Assessment Completed by Lowell DAWSON RN and Emilie CRUZ RN.    Head WDL  Ears WDL  Nose WDL  Mouth WDL  Neck WDL  Breast/Chest WDL  Shoulder Blades WDL  Spine WDL  (R) Arm/Elbow/Hand WDL  (L) Arm/Elbow/Hand WDL  Abdomen WDL  Groin Incision  Scrotum/Coccyx/Buttocks WDL  (R) Leg Scar  (L) Leg Scar  (R) Heel/Foot/Toe WDL  (L) Heel/Foot/Toe WDL          Devices In Places Blood Pressure Cuff, Pulse Ox, Kelley, Arterial Line, ET Tube, and OG/NG      Interventions In Place Pillows and Q2 Turns    Possible Skin Injury No    Pictures Uploaded Into Epic N/A  Wound Consult Placed N/A  RN Wound Prevention Protocol Ordered No

## 2023-05-24 NOTE — ASSESSMENT & PLAN NOTE
Underwent PCI with SABINO x1 to his RCA with SHADIA II flow.  He was noted to have akinesis of the entire inferior wall and elevated LV filling pressures consistent with decompensated heart failure.   Echocardiogram confirms EF of 25%, global hypokinesis  - monitor and replete lytes appropriately  - prasugrel, aspirin  - statin, high intensity  Initiate beta-blockade once tolerating  Patient has been titrated off Levophed  Forced diuresis as tolerated  Postcardiac follow-up, follow-up echocardiogram and a certain interval  GDMT as tolerated

## 2023-05-24 NOTE — ASSESSMENT & PLAN NOTE
Following cardiac arrest - likely from ischemia and component of congestion from decompensated heart failure

## 2023-05-24 NOTE — ASSESSMENT & PLAN NOTE
Following cardiac arrest - likely from ischemia and component of congestion from decompensated heart failure  Monitor closely, avoid offending agents

## 2023-05-24 NOTE — ASSESSMENT & PLAN NOTE
Likely multifactorial from cardiac arrest/hypoperfusion and potentially cardiorenal now given e/o heart failure    - monitor w/diuresis  - hold IVFs for now

## 2023-05-24 NOTE — ASSESSMENT & PLAN NOTE
VT arrest due to RCA occlusion - underwent multiple rounds of defibrillation, CPR, ACLS   now s/p PCI to RCA  Monitor closely, optimize medical therapy  Patient with significant reduction of ejection fraction,  Cardiology following  Stabilized to transfer out of CICU

## 2023-05-24 NOTE — ASSESSMENT & PLAN NOTE
No fevers or clinical e/o infection  Likely reactive, following cardiac arrest  Monitor closely,  Low threshold to treat for possible pulmonary infection given the patient's prolonged cardiac resuscitation

## 2023-05-24 NOTE — ASSESSMENT & PLAN NOTE
Holding home antihypertensives secondary to hypotension post intervention  Monitor closely and reinstitute as indicated and tolerated

## 2023-05-24 NOTE — ASSESSMENT & PLAN NOTE
S/p cardioversion on 5/23 multiple times - had one episode of NSVT for 30 seconds today w/o hemodynamic compromise.  - optimized lytes  - monitor  S/p administration of amiodarone, lidocaine  Monitor closely,  LifeVest on discharge given the patient's poor ejection fraction, high risk for additional cardiac dysrhythmia, sudden cardiac death

## 2023-05-24 NOTE — PROGRESS NOTES
1633 Pt to room red 8 as a stemi.  Pt was having chest pain and called EMS.  ASA prior to arival.  Pt appeared to have inferior stemi per EMS.  Pt went into V tach with defib at 200J x! With ROSC.  Pt arrives in sinus tach awake.      1635- Pt went into vfib and defib at 200J with CPR.  1637 ROSC.  /30  ST.  IV's X3  1641 Vfib defib X1 at 200J Rosc   1642 30etomidate and 100Roc for intubation with 7.0 ET  1647- 100 lidocaine IV ST  1652- /121 Vtach Defib x 1 at 200J  1654- 100 lidocaine IV  1655- ROSC  1701- 2 grams of Mag  1704- in cath lab, RUFUS Leyva to take over

## 2023-05-24 NOTE — PROGRESS NOTES
"Critical Care Progress Note    Date of admission  5/23/2023    Chief Complaint/Hospital Course  \"68 y.o. male with a past medical history of hypertension, hyperlipidemia, chronic pain who presented 5/23/2023 with ST elevation myocardial infarction which was complicated by a cardiac arrest in route to the hospital.  The patient arrived showing an inferior MI and subsequently went into VT which required 3 separate cardioversions and lidocaine 100 mg IV bolus x2.  He obtained ROSC and was noted to be neurologically intact therefore was intubated and the Cath Lab for PCI.  Taken to he underwent PCI with SABINO x1 to his RCA with SHADIA II flow.  He was noted to have akinesis of the entire inferior wall and elevated LV filling pressures consistent with decompensated heart failure.  He arrives in the ICU sedated on propofol, moving all 4 extremities purposefully and hypertensive.\"    5/24: extubated this AM. One 30 second episode of NSVT - lytes wnl.     Interval Problem Update  Reviewed last 24 hour events:  Cardiac: BP 80-90s on levo, HR 50s  Pulm: 480/30%/8/26 - 7.46/31.9/83 - extubated this AM  Heme: down to 13.7 from 15.5  /renal: wnl  GI/endo: npo  ID:  +leukocytosis - no abx/cultures  I/O: +60cc  Additional Labs/Imaging:   Trop 28,   CXR very mild prominence of vascular markings  - f/u TTE  - restart home methadone  - f/u EKG    Review of Systems  Review of Systems   Constitutional:  Positive for malaise/fatigue. Negative for chills and fever.   Respiratory:  Negative for cough and shortness of breath.    Cardiovascular:  Negative for chest pain and palpitations.        Chest soreness   Gastrointestinal:  Negative for nausea and vomiting.   Neurological:  Negative for focal weakness.        Vital Signs for last 24 hours   Temp:  [35.9 °C (96.7 °F)-36.3 °C (97.4 °F)] 36.3 °C (97.4 °F)  Pulse:  [] 58  Resp:  [7-32] 16  BP: ()/() 91/50  SpO2:  [81 %-100 %] 96 %    Hemodynamic parameters for last " 24 hours       Respiratory Information for the last 24 hours  Vent Mode: APVCMV  Rate (breaths/min): 26  Vt Target (mL): 480  PEEP/CPAP: 8  P Support: 5  MAP: 9.1  Length of Weaning Trial (Hours): 1  Control VTE (exp VT): 469    Physical Exam   Physical Exam  Vitals and nursing note reviewed.   Constitutional:       Appearance: He is ill-appearing.   HENT:      Head: Normocephalic.      Nose: Nose normal.      Mouth/Throat:      Mouth: Mucous membranes are moist.   Eyes:      Conjunctiva/sclera: Conjunctivae normal.   Cardiovascular:      Rate and Rhythm: Regular rhythm. Bradycardia present.   Pulmonary:      Effort: Pulmonary effort is normal. No respiratory distress.      Breath sounds: No wheezing.   Abdominal:      General: There is no distension.      Palpations: Abdomen is soft.   Musculoskeletal:         General: No deformity or signs of injury.   Skin:     General: Skin is warm and dry.   Neurological:      General: No focal deficit present.      Mental Status: He is alert and oriented to person, place, and time.         Medications  Current Facility-Administered Medications   Medication Dose Route Frequency Provider Last Rate Last Admin    oxyCODONE immediate-release (ROXICODONE) tablet 5 mg  5 mg Enteral Tube Q6HRS PRN Cyn Hobson M.D.   5 mg at 05/24/23 0823    ondansetron (ZOFRAN ODT) dispertab 4 mg  4 mg Enteral Tube Q4HRS PRN Prosper Cummings Jr., D.OMagdaleno        methadone (DOLOPHINE) 10 MG/ML solution 10 mg  10 mg Oral 4X/DAY Tammy Pritchett M.D.   10 mg at 05/24/23 0947    furosemide (LASIX) tablet 20 mg  20 mg Oral Q DAY Tammy Pritchett M.D.        furosemide (LASIX) injection 20 mg  20 mg Intravenous BID DIURETIC Tammy Pritchett M.D.        Respiratory Therapy Consult   Nebulization Continuous RT Prosper Cummings Jr., D.O.        famotidine (PEPCID) tablet 20 mg  20 mg Enteral Tube Q12HRS Prosper Cummings Jr. D.O.   20 mg at 05/24/23 0525    Or    famotidine (PEPCID) injection 20 mg   20 mg Intravenous Q12HRS GABRIEL Quarles Jr.OMagdaleno        senna-docusate (PERICOLACE or SENOKOT S) 8.6-50 MG per tablet 2 Tablet  2 Tablet Enteral Tube BID MIRELLA Quarles Jr..OMadgaleno   2 Tablet at 05/24/23 0525    And    polyethylene glycol/lytes (MIRALAX) PACKET 1 Packet  1 Packet Enteral Tube QDAY PRN Prosper Cummings Jr., D.O.        And    magnesium hydroxide (MILK OF MAGNESIA) suspension 30 mL  30 mL Enteral Tube QDAY PRN MIRELLA Quarles Jr..OMagdaleno        And    bisacodyl (DULCOLAX) suppository 10 mg  10 mg Rectal QDAY PRN Prosper Cummings Jr., D.O.        MD Alert...ICU Electrolyte Replacement per Pharmacy   Other PHARMACY TO DOSE Prosper Cummings Jr., D.O.        lidocaine (XYLOCAINE) 1 % injection 2 mL  2 mL Tracheal Tube Q30 MIN PRN GABRIEL Quarles Jr.OMagdaleno        fentaNYL (SUBLIMAZE) injection 100 mcg  100 mcg Intravenous Q15 MIN PRN MIRELLA Quarles Jr..OMagdaleno   100 mcg at 05/24/23 0722    And    fentaNYL (SUBLIMAZE) injection 200 mcg  200 mcg Intravenous Q15 MIN PRN GABRIEL Quarles Jr.OMagdaleno        And    fentaNYL (SUBLIMAZE) 50 mcg/mL in 50mL (Continuous Infusion)   Intravenous Continuous MIRELLA Quarles Jr..OMagdaleno        amiodarone (NEXTERONE) IVPB 150 mg  150 mg Intravenous Once Justyn Grajeda M.D.        rocuronium (ZEMURON) injection 100 mg  100 mg Intravenous Once Justyn Grajeda M.D.        Pharmacy Consult: Enteral tube insertion - review meds/change route/product selection   Other PHARMACY TO DOSE Vito Arriaga M.D.        prasugrel (EFFIENT) tablet 10 mg  10 mg Enteral Tube DAILY MIRELLA Quarles Jr..O.   10 mg at 05/24/23 0526    aspirin (ASA) chewable tab 81 mg  81 mg Enteral Tube DAILY MIRELLA Quarles Jr..O.   81 mg at 05/24/23 0525    atorvastatin (LIPITOR) tablet 80 mg  80 mg Enteral Tube Q EVENING MIRELLA Quarles Jr..O.   80 mg at 05/23/23 2014    enoxaparin (Lovenox) inj 40 mg  40 mg Subcutaneous DAILY AT 1800 Prosper Cummings Jr., D.O.   40 mg at 05/23/23 2035    hydrALAZINE  (APRESOLINE) injection 10 mg  10 mg Intravenous Q4HRS PRN Prosper Cummings Jr. D.O.        ondansetron (ZOFRAN) syringe/vial injection 4 mg  4 mg Intravenous Q4HRS PRN MIRELLA Quarles Jr..O.        insulin regular (HumuLIN R,NovoLIN R) injection  1-6 Units Subcutaneous Q6HRS MIRELLA Quarles Jr..O.        And    dextrose 10 % BOLUS 25 g  25 g Intravenous Q15 MIN PRN MIRELLA Quarles Jr..O.        norepinephrine (Levophed) 8 mg in 250 mL NS infusion (premix)  0-1 mcg/kg/min Intravenous Continuous MIRELLA Quarles Jr..O.   Stopped at 05/24/23 1106       Fluids    Intake/Output Summary (Last 24 hours) at 5/24/2023 1203  Last data filed at 5/24/2023 1115  Gross per 24 hour   Intake 1577.04 ml   Output 620 ml   Net 957.04 ml       Laboratory  Recent Labs     05/23/23  1853 05/23/23  2227 05/24/23  0305   ISTATAPH 7.297* 7.476 7.462   ISTATAPCO2 54.8* 31.4 31.9   ISTATAPO2 216* 118* 83   ISTATATCO2 28 24 24   QNEROTR0OZI 100* 99 97   ISTATARTHCO3 26.8* 23.1 22.8   ISTATARTBE -1 0 0   ISTATTEMP 97.1 F 96.8 F 97.8 F   ISTATFIO2 60 40 30   ISTATSPEC Arterial Arterial Arterial   ISTATAPHTC 7.309* 7.491 7.468   DDEGAZMG5XM 212* 112* 81     Recent Labs     05/24/23  0330   CPKTOTAL 7065*     Recent Labs     05/23/23  1645 05/23/23  2030 05/24/23  0330   SODIUM 139 140 139  136   POTASSIUM 2.9* 3.3* 4.2  3.9   CHLORIDE 99 101 101  99   CO2 21 24 22  23   BUN 19 20 26*  26*   CREATININE 0.79 0.78 1.37  1.24   MAGNESIUM  --  2.3 2.4   PHOSPHORUS  --  5.0* 4.8*   CALCIUM 8.9 8.4* 8.6  8.3*     Recent Labs     05/23/23 1645 05/23/23  2030 05/24/23  0330   ALTSGPT 69*  --  266*   ASTSGOT 50*  --  739*   ALKPHOSPHAT 67  --  59   TBILIRUBIN 0.2  --  0.5   LIPASE 27  --   --    GLUCOSE 162* 143* 153*  155*     Recent Labs     05/23/23 1645 05/24/23  0330   WBC 23.5* 22.1*   NEUTSPOLYS 70.00 67.90   LYMPHOCYTES 18.00* 23.50   MONOCYTES 9.00 6.50   EOSINOPHILS 0.00 0.30   BASOPHILS 0.00 0.30   ASTSGOT 50* 739*   ALTSGPT  69* 266*   ALKPHOSPHAT 67 59   TBILIRUBIN 0.2 0.5     Recent Labs     05/23/23  1645 05/24/23  0330   RBC 4.77 4.22*   HEMOGLOBIN 15.5 13.7*   HEMATOCRIT 45.8 40.4*   PLATELETCT 347 379   PROTHROMBTM 13.6  --    APTT 28.1  --    INR 1.05  --        Imaging  Reviewed     Assessment/Plan  * STEMI (ST elevation myocardial infarction) (HCC)- (present on admission)  Assessment & Plan  Underwent PCI with SABINO x1 to his RCA with SHADIA II flow.  He was noted to have akinesis of the entire inferior wall and elevated LV filling pressures consistent with decompensated heart failure.     - stop IVFs  - f/u TTE  - monitor and replete lytes appropriately  - prasugrel, aspirin  - statin   - will hold off on BB, ACEi at this time since he's still on levo and being titrated off  - titrate off levo as tolerated   - start lasix 20mg IV BID and titrate as appropriate    TESS (acute kidney injury) ([facility])  Assessment & Plan  Likely multifactorial from cardiac arrest/hypoperfusion and potentially cardiorenal now given e/o heart failure    - monitor w/diuresis  - hold IVFs for now    Transaminitis  Assessment & Plan  Following cardiac arrest - likely from ischemia and component of congestion from decompensated heart failure    Leukocytosis  Assessment & Plan  No fevers or clinical e/o infection  Likely reactive, following cardiac arrest    Hypokalemia- (present on admission)  Assessment & Plan  Replete to maintain >4  Monitor and replete mag    Hypercholesteremia- (present on admission)  Assessment & Plan    Continue atorvastatin 80 mg daily      HTN (hypertension)- (present on admission)  Assessment & Plan  Holding home antihypertensives as we're weaning levophed - once stable, can be restarted and titrated appropriately    Chronic pain- (present on admission)  Assessment & Plan  Restart home methadone 10mg 4 times a day  PRN oxy    Cardiac arrest (HCC)- (present on admission)  Assessment & Plan  VT arrest due to RCA occlusion - underwent  multiple rounds of defibrillation. Now s/p PCI to RCA    - monitor   - cardiac GDMT    Acute respiratory failure with hypoxia (HCC)- (present on admission)  Assessment & Plan  Intubated periarrest  Extubated on 5/24 following cath and now on NC - wean as tolerated    VT (ventricular tachycardia) (HCC)- (present on admission)  Assessment & Plan  S/p cardioversion on 5/23 multiple times - had one episode of NSVT for 30 seconds today w/o hemodynamic compromise.    - optimized lytes  - monitor  - consider antiarrhythmics if recurrent episodes         VTE:  Lovenox  Ulcer: H2 Antagonist  Lines: None    I have performed a physical exam and reviewed and updated ROS and Plan today (5/24/2023). In review of yesterday's note (5/23/2023), there are no changes except as documented above.     Discussed patient condition and risk of morbidity and/or mortality with Family, RN, RT, Pharmacy, Charge nurse / hot rounds, and Patient  The patient remains critically ill.  Critical care time = 75 minutes in directly providing and coordinating critical care and extensive data review.  No time overlap and excludes procedures.        Tammy Pritchett MD  Pulmonary and Critical Care Medicine  [facility]

## 2023-05-24 NOTE — CONSULTS
Hospital Medicine Consultation    Date of Service  5/24/2023    Referring Physician  Dr. Tammy Pritchett the    Consulting Physician  Dax Cabrales M.D.    Reason for Consultation  Hospital medicine consultation requested patient admitted with an acute ST elevation myocardial infarction    History of Presenting Illness  68 y.o. male who presented 5/23/2023 with a past med history of hypertension, dyslipidemia, chronic pain, restless leg syndrome, tobacco abuse, presents on 5/23/2023 from [place] at the [facility] in [place] with an ST elevation myocardial infarction, complicated by a cardiac arrest in route to the hospital, the patient had a total of 16 shocks, CPR, received lidocaine as well as amiodarone, the patient obtained ROSC and was neurologically intact, subsequently was intubated and brought to the Cath Lab for intervention, he underwent PCI and drug-eluting stenting x1 to his RCA with SHADIA II flow, the patient was found to have significant akinesis of the entire inferior wall and elevated LV filling pressures, was brought to the ICU sedated, on propofol, moving all 4 extremities, requiring pressor therapy for blood pressure support.  On 5/24 the patient was extubated in the morning, he had additional episode of 32nd VT, the patient later in the afternoon is reported to be further stabilized and to be transferred out of ICU to the [facility] level of care for the time being.  Patient continues to have chest pain from CPR, he denies current dizziness, no nausea vomiting, no fevers chills.  Patient was started on diuretics currently.  The patient was identified to have a ischemic cardiomyopathy now with ejection fraction of 25% with global hypokinesis.  Currently patient is afebrile, his heart rate is in the 50s, respiration is unlabored, the patient is still on nasal cannula oxygen, currently saturating in the 90s, blood pressure is soft coming off norepinephrine, currently around  100/60,  His laboratory data indicates a white cell count of 22.1, hemoglobin 13.7, platelet count 379, chemistry with a sodium of 139, potassium 4.2, chloride 101, bicarb 22, glucose 153, BUN 26, creatinine 1.37, , , a total protein of 5.6, albumin 3.5, CPK 7065, cholesterol panel with a LDL of 83, HDL 50, urine drug screen positive for methadone, troponin level initially at 28,    Review of Systems  Review of Systems   Constitutional:  Positive for malaise/fatigue.   HENT: Negative.     Eyes: Negative.    Respiratory:  Positive for cough, sputum production and shortness of breath.    Cardiovascular:  Positive for chest pain.   Gastrointestinal: Negative.    Genitourinary: Negative.    Musculoskeletal: Negative.         Restless leg syndrome longstanding   Skin: Negative.    Neurological: Negative.    Endo/Heme/Allergies: Negative.    Psychiatric/Behavioral:  The patient is nervous/anxious.    All other systems reviewed and are negative.      Past Medical History   has a past medical history of Dyslipidemia, Hypertension, RLS (restless legs syndrome), and Tobacco abuse.    Surgical History   has a past surgical history that includes knee arthroplasty total (Bilateral).    Family History  family history includes Abdominal aortic aneurysm in his mother; Dementia in his father.    Social History   reports that he has been smoking cigarettes. He has a 15.00 pack-year smoking history. He does not have any smokeless tobacco history on file. He reports that he does not currently use alcohol. He reports that he does not currently use drugs.    Medications  Prior to Admission Medications   Prescriptions Last Dose Informant Patient Reported? Taking?   atorvastatin (LIPITOR) 20 MG Tab 5/22/2023 at 2100  Yes Yes   Sig: Take 100 mg by mouth every evening.   hydroCHLOROthiazide (HYDRODIURIL) 25 MG Tab 5/23/2023 at 0800 Family Member Yes Yes   Sig: Take 25 mg by mouth every day. Unsure on dose frequency   losartan  (COZAAR) 50 MG Tab 5/23/2023 at 0800 Family Member Yes Yes   Sig: Take 100 mg by mouth every day. Unsure on dose frequency   methadone (DOLOPHINE) 5 MG Tab 5/23/2023 at 0800 Patient Yes Yes   Sig: Take 20 mg by mouth. 20mg in the morning, 25mg in the afternoon for chronic back pain and restless leg syndrome      Facility-Administered Medications: None       Allergies  Allergies   Allergen Reactions    Neomycin-Bacitracin-Polymyxin        Physical Exam  Temp:  [35.9 °C (96.7 °F)-36.3 °C (97.4 °F)] 36.3 °C (97.4 °F)  Pulse:  [] 54  Resp:  [7-32] 18  BP: ()/() 91/50  SpO2:  [81 %-100 %] 96 %    Physical Exam  Vitals and nursing note reviewed.   Constitutional:       Appearance: He is well-developed. He is obese. He is ill-appearing.      Comments: Pt seen and examined.   HENT:      Head: Normocephalic and atraumatic.   Eyes:      Pupils: Pupils are equal, round, and reactive to light.   Cardiovascular:      Rate and Rhythm: Regular rhythm. Bradycardia present.      Heart sounds: Normal heart sounds.      Comments: Chest wall tenderness  Pulmonary:      Effort: Pulmonary effort is normal.      Breath sounds: Rhonchi present.   Abdominal:      General: Bowel sounds are normal.      Palpations: Abdomen is soft.      Comments: Protuberant abdomen   Musculoskeletal:         General: Normal range of motion.      Cervical back: Normal range of motion and neck supple.   Skin:     General: Skin is warm and dry.   Neurological:      General: No focal deficit present.      Mental Status: He is alert and oriented to person, place, and time.   Psychiatric:         Behavior: Behavior normal.         Fluids  Date 05/24/23 0700 - 05/25/23 0659   Shift 1444-4487 5600-5140 1614-5143 24 Hour Total   INTAKE   P.O. 240   240   I.V. 944.3   944.3   Other 45   45   Shift Total 1229.3   1229.3   OUTPUT   Urine 225   225   Shift Total 225   225   Weight (kg) 120.8 120.8 120.8 120.8       Laboratory  Recent Labs      05/23/23 1645 05/24/23  0330   WBC 23.5* 22.1*   RBC 4.77 4.22*   HEMOGLOBIN 15.5 13.7*   HEMATOCRIT 45.8 40.4*   MCV 96.0 95.7   MCH 32.5 32.5   MCHC 33.8 33.9   RDW 49.1 49.8   PLATELETCT 347 379   MPV 9.4 9.8     Recent Labs     05/23/23  1645 05/23/23  2030 05/24/23  0330   SODIUM 139 140 139  136   POTASSIUM 2.9* 3.3* 4.2  3.9   CHLORIDE 99 101 101  99   CO2 21 24 22 23   GLUCOSE 162* 143* 153*  155*   BUN 19 20 26*  26*   CREATININE 0.79 0.78 1.37  1.24   CALCIUM 8.9 8.4* 8.6  8.3*     Recent Labs     05/23/23 1645   APTT 28.1   INR 1.05          Recent Labs     05/24/23  0330   TRIGLYCERIDE 171*   HDL 50   LDL 83        Imaging  EC-ECHOCARDIOGRAM COMPLETE W/ CONT   Final Result      DX-CHEST-PORTABLE (1 VIEW)   Final Result      1.  Low lung volumes without definite acute cardiopulmonary abnormality.      DX-ABDOMEN FOR TUBE PLACEMENT   Final Result      Nasogastric tube tip terminates within the stomach.      DX-CHEST-PORTABLE (1 VIEW)   Final Result      1.  No acute cardiac or pulmonary abnormalities are identified.   2.  Endotracheal tube tip at the level of the clavicular heads      CL-LEFT HEART CATHETERIZATION WITH POSSIBLE INTERVENTION    (Results Pending)       Assessment/Plan  * STEMI (ST elevation myocardial infarction) (HCC)- (present on admission)  Assessment & Plan  Underwent PCI with SABINO x1 to his RCA with SHADIA II flow.  He was noted to have akinesis of the entire inferior wall and elevated LV filling pressures consistent with decompensated heart failure.   Echocardiogram confirms EF of 25%, global hypokinesis  - monitor and replete lytes appropriately  - prasugrel, aspirin  - statin, high intensity  Initiate beta-blockade once tolerating  Patient has been titrated off Levophed  Forced diuresis as tolerated  Postcardiac follow-up, follow-up echocardiogram and a certain interval  GDMT as tolerated    Cardiac arrest (HCC)- (present on admission)  Assessment & Plan  VT arrest due to RCA  occlusion - underwent multiple rounds of defibrillation, CPR, ACLS   now s/p PCI to RCA  Monitor closely, optimize medical therapy  Patient with significant reduction of ejection fraction,  Cardiology following  Stabilized to transfer out of CICU    Acute respiratory failure with hypoxia (HCC)- (present on admission)  Assessment & Plan  Intubated periarrest  Extubated on 5/24 following cath and now on NC - wean as tolerated  Pulmonary toilet, monitor    TESS (acute kidney injury) (HCC)- (present on admission)  Assessment & Plan  Likely multifactorial from cardiac arrest/hypoperfusion and potentially cardiorenal now given e/o heart failure  Monitor  closely, avoid nephrotoxins      Tobacco abuse disorder- (present on admission)  Assessment & Plan  Strongly suggested to engage in lifelong smoking cessation    Restless leg syndrome- (present on admission)  Assessment & Plan  Existing, symptomatic treatment    Transaminitis- (present on admission)  Assessment & Plan  Following cardiac arrest - likely from ischemia and component of congestion from decompensated heart failure  Monitor closely, avoid offending agents      Leukocytosis- (present on admission)  Assessment & Plan  No fevers or clinical e/o infection  Likely reactive, following cardiac arrest  Monitor closely,  Low threshold to treat for possible pulmonary infection given the patient's prolonged cardiac resuscitation    Hypokalemia- (present on admission)  Assessment & Plan  Replete to maintain >4  Monitor and replete mag    Hypercholesteremia- (present on admission)  Assessment & Plan  Maximized statin therapy in form of atorvastatin 80 mg daily      HTN (hypertension)- (present on admission)  Assessment & Plan  Holding home antihypertensives secondary to hypotension post intervention  Monitor closely and reinstitute as indicated and tolerated    Chronic pain- (present on admission)  Assessment & Plan  Restart home methadone at home dosing  PRN oxy    VT  (ventricular tachycardia) (HCC)- (present on admission)  Assessment & Plan  S/p cardioversion on 5/23 multiple times - had one episode of NSVT for 30 seconds today w/o hemodynamic compromise.  - optimized lytes  - monitor  S/p administration of amiodarone, lidocaine  Monitor closely,        Plan  Continue with current dose monitoring to rule out additional cardiac dysrhythmia  Initiate treatment for heart failure is much as the patient currently tolerated given his low blood pressure  Low heart rate  Continued treatment for chronic pain   dual antiplatelet therapy  Monitor and replace electrolytes  Pain control after extensive CPR, resuscitation efforts  Pulmonary toilet, incentive spirometry  Close laboratory follow-up  GDMT titration as tolerated    Thank you for consulting with us, we will follow along closely while patient is hospitalized    Patient is has a high medical complexity, complex decision making and is at high risk for complication, morbidity, and mortality.  My total time spent caring for the patient on the day of the encounter was 65 minutes.   This does not include time spent on separately billable procedures/tests.    Please note that this dictation was created using voice recognition software. I have made every reasonable attempt to correct obvious errors, but I expect that there are errors of grammar and possibly context that I did not discover before finalizing the note.

## 2023-05-24 NOTE — FLOWSHEET NOTE
05/24/23 0727   Spontaneous Breathing Trial (SBT)   Spontaneous breathing trial (SBT) outcome Pt weaned for 1 hour and returned to rest settings per protocol - SBT Pass  (Remains on Spont)   Length of Weaning Trial (Hours) 1   Weaning Parameters   RR (bpm) 8   $ FVC / Vital Capacity (liters)  2.8   NIF (cm H2O)  -38   Rapid Shallow Breathing Index (RR/VT) 10   Spontaneous VE 10.5   Spontaneous VT 1313

## 2023-05-24 NOTE — ASSESSMENT & PLAN NOTE
Likely multifactorial from cardiac arrest/hypoperfusion and potentially cardiorenal now given e/o heart failure  Monitor  closely, avoid nephrotoxins

## 2023-05-24 NOTE — PROCEDURES
Cardiac Catheterization Procedure Note    DATE: 5/23/2023    : Vito Arriaga MD    PROCEDURES PERFORMED:  Left heart catheterization with left ventriculography  Coronary angiography  Instantaneous wave free ratio assessment of the right coronary artery  Aspiration thrombectomy and percutaneous coronary intervention to the thrombotically occluded proximal to mid right coronary artery  Moderate conscious sedation    INDICATIONS:  The patient is a 68-year-old gentleman who presented with sudden onset of severe chest pain with inferior ST elevations on ECG.  He subsequently suffered multiple VT/VF arrest requiring defibrillation and intubation.  He was brought emergently to the cardiac catheterization laboratory emergently.    CONSENT:  Signed informed consent was not obtained as the patient was intubated and sedated and the procedure was considered emergent.    MEDICATIONS:  Lidocaine  Fentanyl  Midazolam  Nitroglycerin  Verapamil  Heparin  Lidocaine  Amiodarone  Phenylephrine  Eptifibatide    CONTRAST: Omnipaque 84 cc    ACCESS:  6-Lithuanian Glidesheath unsuccessfully placed in the right radial artery.  6-Lithuanian [place] sheath successfully placed in the right femoral artery.    ESTIMATED BLOOD LOSS: 20 cc    COMPLICATIONS: None    PROCEDURE IN DETAIL:  The patient was brought to the cardiac catheterization laboratory emergently.  While preparing the patient, he had multiple episodes of recurrent VF/VT requiring additional defibrillation and administration of intravenous lidocaine and amiodarone.  The skin over the right wrist was prepped and draped in the usual sterile fashion.  Right radial access could not be successfully obtained due to tortuosity in the right radial artery despite punctures at multiple access sites.  Therefore, femoral access was obtained using ultrasound guidance and a 6-Lithuanian [place] sheath.  A hemostatic band was placed over the right radial artery for hemostasis.    After obtaining  "access, a 6-Bhutanese JR-4 guide catheter was advanced over a J-wire into the aortic root and was used to engage the ostium of the right coronary.  Cineangiography demonstrated a complete proximal thrombotic occlusion and we proceeded with intervention.  A 0.014\" Run-through wire was then advanced across the lesion and placed in the distal aspect of the vessel.  A 2.5 x 12 mm compliant balloon was then advanced over the guidewire and was used to pre-dilate the lesion at nominal pressure.  As there was a large amount of thrombus burden, a Priority 1 aspiration catheter was used to perform two aspiration passes with removal of a large amount of red thrombus.  Following aspiration, a 4.0 x 38 mm [place] drug-eluting stent was advanced over the guidewire and was deployed across the lesion at a maximum pressure of 16 camilla.  After deployment, the stent was evaluated by IVUS.  This demonstrated that the distal aspect of the stent landed in an area of mild to moderate concentric plaque with adequate apposition.  The mid aspect of the stent was mildly underexpanded, but still achieved a minimal luminal area of 11.5 mm².  The proximal aspect of the stent landed in an area of moderate, mostly eccentric plaque with adequate apposition.  Given the extent of thrombus burden, no postdilation was performed.  Final cineangiograms were obtained in orthogonal views, which demonstrated excellent stent expansion and apposition with no evidence of wire perforation, thrombus or dissection.      Following intervention, the JR catheter was exchanged for a 6-Bhutanese JL-4 diagnostic catheter, which was used to engage the ostium of the left main coronary artery.  Cineangiograms were then performed in multiple projections for complete evaluation of the left coronary system.  This catheter was then exchanged for a 6-Bhutanese pigtail catheter, which was advanced into the left ventricular cavity.  This catheter was used to perform a left ventriculogram and " then was withdrawn across the aortic valve with sequential pressures measured.  At the completion of the case, all wires, catheters, and sheaths were removed.  A 6-Latvian Angio-Seal device was deployed in the right femoral artery after confirming appropriate sheath placement by limited right iliofemoral angiography.    HEMODYNAMICS:   Aortic pressure: 116/85 mmHg  Pre A-wave pressure: 24 mmHg  No significant aortic gradient on pullback    LEFT VENTRICULOGRAPHY:  Estimated left ventricular ejection fraction 40-45%  Akinesis of the entire inferior wall extending into the inferior apex.    CORONARY ANGIOGRAPHY:  The left main coronary artery is a short, patent vessel that bifurcates into the left anterior descending and left circumflex coronary arteries.  The left anterior descending coronary artery is a moderate vessel with proximal luminal irregularities prior to supplying two moderate to large diagonal branches that share a common ostium.  The lower diagonal branch has a focal mid 60% stenosis.  The ongoing LAD after the diagonal branches has a prominent mid-distal myocardial bridge, but otherwise luminal irregularities.  The left circumflex coronary artery is a moderate, nondominant vessel that supplies a small first obtuse marginal branch, a moderate second obtuse marginal branch, and two small distal obtuse marginal branches and has luminal irregularities in the distribution.  The right coronary artery is a large, dominant vessel that is proximally occluded with thrombus.  Following intervention, the vessel seem to supply a large posterior descending coronary that was distally occluded with embolized thrombus and a very large posterolateral branch with luminal irregularities.    PERCUTANEOUS CORONARY INTERVENTION:  Lesion location: Proximal right coronary artery thrombotic occlusion  Lesion type: C  Lesion length: 20 mm  Pre-intervention SHADIA flow: 0  Post-intervention SHADIA flow: 2  Pre-intervention stenosis:  100%  Post-intervention stenosis: 10%  Stent: 4.0 x 38 [place] mm Resolute Ronnie drug-eluting stent    IMPRESSION:  Severe obstructive one-vessel coronary artery disease with an acute thrombotic occlusion of the proximal right coronary artery successfully treated with one 4.0 x 38 mm drug-eluting stent.  Prominent mid-distal left anterior descending coronary myocardial bridge.  Mildly reduced left ventricular systolic function with inferior wall akinesis.  Mild to moderate elevated left heart filling pressures consistent with acute decompensated ischemic heart failure.    RECOMMENDATIONS:  Admit to ICU for further management.  TR band release per protocol.  Bedrest for at least 4 hours with routine groin precautions.  Place OG tube and give 60 mg of prasugrel immediately upon arrival in the ICU.  Dual antiplatelet therapy for at least 6-12 months if tolerated with aspirin 81 mg daily and prasugrel 10 mg daily if no contraindications.  Optimal medical management of acute myocardial infarction and ischemic cardiomyopathy.  Referral to cardiac rehabilitation on discharge.    NOTIFICATION:  The patient's family was notified of the results of his cardiac catheterization.

## 2023-05-24 NOTE — ASSESSMENT & PLAN NOTE
Underwent PCI with SABINO x1 to his RCA with SHADIA II flow.  He was noted to have akinesis of the entire inferior wall and elevated LV filling pressures consistent with decompensated heart failure.     - stop IVFs  - f/u TTE  - monitor and replete lytes appropriately  - prasugrel, aspirin  - statin   - will hold off on BB, ACEi at this time since he's still on levo and being titrated off  - titrate off levo as tolerated   - start lasix 20mg IV BID and titrate as appropriate

## 2023-05-24 NOTE — PROGRESS NOTES
[facility] Progress Note      Admit Date: 5/23/2023    Resident(s): Cyn Hobson M.D.   Attending:  MJ MILLIGAN/ Dr. Escobedo    Patient ID:    Name:  Des Elam     YOB: 1955  Age:  68 y.o.  male   MRN:  [id number]    Hospital Course (carried forward and updated):  Des Elam is a 68 y.o. male with 68 y.o. male with a past medical history of hypertension, hyperlipidemia, chronic back pain  on methadone who presented 5/23/2023 with ST elevation myocardial infarction which was complicated by a cardiac arrest in route to the hospital.  The patient arrived showing an inferior MI and subsequently went into VT which required 3 separate cardioversions and lidocaine 100 mg IV bolus x2.  He obtained ROSC and was noted to be neurologically intact therefore was intubated and the Cath Lab for PCI.  Taken to he underwent PCI with SABINO x1 to his RCA with SHADIA II flow.  He was noted to have akinesis of the entire inferior wall and elevated LV filling pressures consistent with decompensated heart failure EF 45% on LV gram  - per Dr Magdaleno Cummings.  He remained intubated following cardiac catheterization and was admitted to the ICU.    Extubated on 524    Consultants:  Critical Care  Cardiology    Interval Events:    Extubated today,  remains on low dose levophed      NEURO: Awake and alert following commands.  Sedation off.  Patient reports chronic back pain.   CARDIOVASC: 30 s  run of V. tach this morning, self terminated.  Remains on Levophed 0.06  RESPIRATORY: Intubated, passed SAT and SBT and was subsequently extubated this morning  GI/NUTRITION: We will transition to cardiac diet  RENAL/FLUID/LYTES: LR at 100 cc an hour for TESS following cardiac catheterization creatinine 1.24 from baseline 0.79  HEME/ONC: Currently on DAPT.  No signs of bleeding  INFECTIOUS D: N/A   ENDOCRINE: N/A  Vitals Range last 24h:  Temp:  [35.9 °C (96.7 °F)-36.1 °C (97 °F)] 36.1 °C (97 °F)  Pulse:  [] 51  Resp:  [7-32] 19  BP:  ()/() 97/73  SpO2:  [81 %-100 %] 100 %      Intake/Output Summary (Last 24 hours) at 5/24/2023 1037  Last data filed at 5/24/2023 0600  Gross per 24 hour   Intake 682.72 ml   Output 620 ml   Net 62.72 ml        ROS reports chronic back pain.  Denies any chest pain    PHYSICAL EXAM:  Vitals:    05/24/23 0628 05/24/23 0630 05/24/23 0645 05/24/23 0728   BP: 97/73      Pulse: (!) 50 60 (!) 51    Resp: 18 (!) 7 19    Temp:       TempSrc:       SpO2: 98% 95% 100%    Weight:    121 kg (266 lb 5.1 oz)   Height:        Body mass index is 35.14 kg/m².    O2 therapy: Pulse Oximetry: 100 %, O2 Delivery Device: Ventilator         Physical Exam  Constitutional:       Appearance: Normal appearance.   HENT:      Head: Normocephalic and atraumatic.      Mouth/Throat:      Mouth: Mucous membranes are moist.      Pharynx: Oropharynx is clear.   Eyes:      Extraocular Movements: Extraocular movements intact.      Conjunctiva/sclera: Conjunctivae normal.      Pupils: Pupils are equal, round, and reactive to light.   Cardiovascular:      Rate and Rhythm: Normal rate.      Heart sounds: No murmur heard.     No friction rub. No gallop.      Comments: Occasional PACs and PVCs  Pulmonary:      Breath sounds: No wheezing or rales.   Abdominal:      General: There is no distension.      Tenderness: There is no abdominal tenderness.   Musculoskeletal:         General: No swelling or deformity.      Cervical back: Normal range of motion.   Skin:     General: Skin is warm and dry.   Neurological:      General: No focal deficit present.      Mental Status: He is alert and oriented to person, place, and time.   Psychiatric:         Mood and Affect: Mood normal.         Behavior: Behavior normal.         Recent Labs     05/23/23  1853 05/23/23  2227 05/24/23  0305   ISTATAPH 7.297* 7.476 7.462   ISTATAPCO2 54.8* 31.4 31.9   ISTATAPO2 216* 118* 83   ISTATATCO2 28 24 24   PEPMVRV5MVU 100* 99 97   ISTATARTHCO3 26.8* 23.1 22.8   ISTATARTBE  -1 0 0   ISTATTEMP 97.1 F 96.8 F 97.8 F   ISTATFIO2 60 40 30   ISTATSPEC Arterial Arterial Arterial   ISTATAPHTC 7.309* 7.491 7.468   [id number] 212* 112* 81     Recent Labs     05/23/23 1645 05/23/23 2030 05/24/23  0330   SODIUM 139 140 139  136   POTASSIUM 2.9* 3.3* 4.2  3.9   CHLORIDE 99 101 101  99   CO2 21 24 22 23   BUN 19 20 26*  26*   CREATININE 0.79 0.78 1.37  1.24   MAGNESIUM  --  2.3 2.4   PHOSPHORUS  --  5.0* 4.8*   CALCIUM 8.9 8.4* 8.6  8.3*     Recent Labs     05/23/23 1645 05/23/23 2030 05/24/23  0330   ALTSGPT 69*  --  266*   ASTSGOT 50*  --  739*   ALKPHOSPHAT 67  --  59   TBILIRUBIN 0.2  --  0.5   LIPASE 27  --   --    GLUCOSE 162* 143* 153*  155*     Recent Labs     05/23/23 1645 05/24/23  0330   RBC 4.77 4.22*   HEMOGLOBIN 15.5 13.7*   HEMATOCRIT 45.8 40.4*   PLATELETCT 347 379   PROTHROMBTM 13.6  --    APTT 28.1  --    INR 1.05  --      Recent Labs     05/23/23 1645 05/24/23  0330   WBC 23.5* 22.1*   NEUTSPOLYS 70.00 67.90   LYMPHOCYTES 18.00* 23.50   MONOCYTES 9.00 6.50   EOSINOPHILS 0.00 0.30   BASOPHILS 0.00 0.30   ASTSGOT 50* 739*   ALTSGPT 69* 266*   ALKPHOSPHAT 67 59   TBILIRUBIN 0.2 0.5       Meds:   oxyCODONE immediate-release  5 mg      ondansetron  4 mg      methadone  10 mg      NS   100 mL/hr at 05/24/23 0544    Respiratory Therapy Consult        famotidine  20 mg      Or    famotidine  20 mg      senna-docusate  2 Tablet      And    polyethylene glycol/lytes  1 Packet      And    magnesium hydroxide  30 mL      And    bisacodyl  10 mg      MD Alert...Adult ICU Electrolyte Replacement per Pharmacy        lidocaine  2 mL      fentaNYL  100 mcg      And    fentaNYL  200 mcg      And    fentaNYL        And    propofol  0-80 mcg/kg/min 20 mcg/kg/min (05/24/23 2598)    amiodarone 150 mg IVPB (LOAD)  150 mg      rocuronium  100 mg      Pharmacy        prasugrel  10 mg      aspirin  81 mg      atorvastatin  80 mg      enoxaparin (LOVENOX) injection  40 mg      hydrALAZINE   10 mg      ondansetron  4 mg      insulin regular  1-6 Units      And    dextrose bolus  25 g      NORepinephrine  0-1 mcg/kg/min 0.06 mcg/kg/min (05/24/23 0634)        Procedures:  Cardiac catheterization and PCI placement to RCA on 5/23    Imaging:  DX-CHEST-PORTABLE (1 VIEW)   Final Result      1.  Low lung volumes without definite acute cardiopulmonary abnormality.      DX-ABDOMEN FOR TUBE PLACEMENT   Final Result      Nasogastric tube tip terminates within the stomach.      DX-CHEST-PORTABLE (1 VIEW)   Final Result      1.  No acute cardiac or pulmonary abnormalities are identified.   2.  Endotracheal tube tip at the level of the clavicular heads      CL-LEFT HEART CATHETERIZATION WITH POSSIBLE INTERVENTION    (Results Pending)   EC-ECHOCARDIOGRAM COMPLETE W/O CONT    (Results Pending)       ASSESSEMENT and PLAN:    * STEMI (ST elevation myocardial infarction) ([facility])- (present on admission)  Assessment & Plan  Inferior wall MI with RCA occlusion, status post PCI x1  LV gram revealed EF of 45% with akinesis of the entire inferior wall extending into the inferior apex and area  No active bleeds from catheter insertion site  Echo pending  Currently on Brilinta, aspirin, atorvastatin 80 mg  He remains on Levophed, will hold off on beta-blocker and ACE until blood pressures tolerate  Continue to monitor for any arrhythmias    TESS (acute kidney injury) ([facility])  Assessment & Plan  Secondary to cardiac arrest and possibly contributed to by contrast from cath  Creatinine 1.37 from baseline of 0.79  Currently on  cc/h following cardiac cath  We will discontinue fluids and encourage p.o. intake    Transaminitis  Assessment & Plan  Following cardiac arrest,  Likely due to ischemia  Continue to trend    Leukocytosis  Assessment & Plan  No fevers  Likely reactive, following cardiac arrest    Hypokalemia- (present on admission)  Assessment & Plan  Replete to maintain >4  Check Mag    Hypercholesteremia- (present on  admission)  Assessment & Plan  Lipid panel from this morning with total cholesterol 167, triglycerides 171, HDL 50, LDL 83   Continue atorvastatin 80 mg daily      HTN (hypertension)- (present on admission)  Assessment & Plan  Home meds included hydrochlorothiazide 25 and losartan 50  will avoid Beta-blocker and ACE/ARB at this time due to decompensated heart failure    Chronic pain- (present on admission)  Assessment & Plan  On methadone at home, 20 mg in a.m. and 25 mg nightly for chronic back pain   Continue home dosing with oxy 5 mg every 6 hours as needed for breakthrough pain    Cardiac arrest ([facility])- (present on admission)  Assessment & Plan  Neurologically intact postcardiac arrest  VT arrest due to RCA occlusion status post multiple rounds of defibrillation  Status post PCI to RCA now with occasional runs of V. tach  Continue to monitor for arrhythmias  We will initiate beta-blocker after patient can be weaned off of pressors    Acute respiratory failure with hypoxia (HCC)- (present on admission)  Assessment & Plan  Intubated periarrest  Extubated on 5/24 following cath and now on NC    VT (ventricular tachycardia) (HCC)- (present on admission)  Assessment & Plan  Received lidocaine x2 doses  Monitor for reperfusion arrhythmias  Telemetry monitoring  optimize electrolytes        DISPO: Patient currently in ICU for close monitoring of arrhythmia status post PCI    CODE STATUS: Full    Quality Measures:  Feeding: Cardiac diet   Analgesia: Home methadone dosing 20 mg every morning and 25 mg in the afternoon, oxy 5 mg every 6 as needed for breakthrough pain  Sedation: Discontinued  Thromboprophylaxis: Lovenox 40  Head of bed: >30 degrees  Ulcer prophylaxis: Not indicated  Glycemic control: Not indicated  Bowel care: bowel regimen  Indwelling lines: 2 peripheral IVs, 1 arterial line  Deescalation of antibiotics: MARCOS Hobson M.D.

## 2023-05-24 NOTE — PROGRESS NOTES
Art- line placement:    Timeout: 2110    Procedure Start: 2112    Guide wire out: 2120    Procedure end: 2125

## 2023-05-24 NOTE — CARE PLAN
The patient is Watcher - Medium risk of patient condition declining or worsening    Shift Goals  Clinical Goals: Stable Hemodynamics  Patient Goals: Rest  Family Goals: DENNIS    Progress made toward(s) clinical / shift goals:      Problem: Knowledge Deficit - Standard  Goal: Patient and family/care givers will demonstrate understanding of plan of care, disease process/condition, diagnostic tests and medications  Outcome: Progressing     Problem: Pain - Standard  Goal: Alleviation of pain or a reduction in pain to the patient’s comfort goal  Outcome: Progressing     Problem: Skin Integrity  Goal: Skin integrity is maintained or improved  Outcome: Progressing     Problem: Fall Risk  Goal: Patient will remain free from falls  Outcome: Progressing     Problem: Safety - Medical Restraint  Goal: Remains free of injury from restraints (Restraint for Interference with Medical Device)  Outcome: Progressing  Goal: Free from restraint(s) (Restraint for Interference with Medical Device)  Outcome: Progressing

## 2023-05-24 NOTE — ASSESSMENT & PLAN NOTE
VT arrest due to RCA occlusion - underwent multiple rounds of defibrillation. Now s/p PCI to RCA    - monitor   - cardiac GDMT

## 2023-05-24 NOTE — PROCEDURES
"Arterial Line Insertion    Date/Time: 5/23/2023 10:08 PM    Performed by: Prosper Cummings Jr., D.O.  Authorized by: Prosper Cummings Jr., D.O.  Consent: Verbal consent obtained.  Risks and benefits: risks, benefits and alternatives were discussed  Consent given by: spouse  Patient identity confirmed: arm band  Time out: Immediately prior to procedure a \"time out\" was called to verify the correct patient, procedure, equipment, support staff and site/side marked as required.  Preparation: Patient was prepped and draped in the usual sterile fashion.  Indications: multiple ABGs, respiratory failure and hemodynamic monitoring  Location: left radial  Anesthesia: local infiltration    Anesthesia:  Local Anesthetic: lidocaine 1% without epinephrine  Yakov's test normal: yes  Needle gauge: 20  Seldinger technique: Seldinger technique used  Number of attempts: 1  Post-procedure: line sutured and dressing applied  Post-procedure CMS: unchanged  Patient tolerance: patient tolerated the procedure well with no immediate complications  Comments: Wire confirmed removed at the end of the procedure by myself and RN                "

## 2023-05-25 LAB
ALBUMIN SERPL BCP-MCNC: 3.3 G/DL (ref 3.2–4.9)
ALBUMIN/GLOB SERPL: 1.4 G/DL
ALP SERPL-CCNC: 54 U/L (ref 30–99)
ALT SERPL-CCNC: 247 U/L (ref 2–50)
ANION GAP SERPL CALC-SCNC: 10 MMOL/L (ref 7–16)
AST SERPL-CCNC: 384 U/L (ref 12–45)
BASE EXCESS BLDA CALC-SCNC: -7 MMOL/L (ref -4–3)
BASOPHILS # BLD AUTO: 0.2 % (ref 0–1.8)
BASOPHILS # BLD: 0.04 K/UL (ref 0–0.12)
BILIRUB SERPL-MCNC: 0.8 MG/DL (ref 0.1–1.5)
BODY TEMPERATURE: ABNORMAL DEGREES
BUN SERPL-MCNC: 21 MG/DL (ref 8–22)
CA-I BLD ISE-SCNC: 1.12 MMOL/L (ref 1.1–1.3)
CALCIUM ALBUM COR SERPL-MCNC: 8.9 MG/DL (ref 8.5–10.5)
CALCIUM SERPL-MCNC: 8.3 MG/DL (ref 8.5–10.5)
CHLORIDE SERPL-SCNC: 102 MMOL/L (ref 96–112)
CO2 BLDA-SCNC: 24 MMOL/L (ref 20–33)
CO2 SERPL-SCNC: 24 MMOL/L (ref 20–33)
CREAT SERPL-MCNC: 0.64 MG/DL (ref 0.5–1.4)
EKG IMPRESSION: NORMAL
EOSINOPHIL # BLD AUTO: 0.1 K/UL (ref 0–0.51)
EOSINOPHIL NFR BLD: 0.6 % (ref 0–6.9)
ERYTHROCYTE [DISTWIDTH] IN BLOOD BY AUTOMATED COUNT: 52.4 FL (ref 35.9–50)
GFR SERPLBLD CREATININE-BSD FMLA CKD-EPI: 102 ML/MIN/1.73 M 2
GLOBULIN SER CALC-MCNC: 2.4 G/DL (ref 1.9–3.5)
GLUCOSE SERPL-MCNC: 112 MG/DL (ref 65–99)
HCO3 BLDA-SCNC: 22.4 MMOL/L (ref 17–25)
HCT VFR BLD AUTO: 37.6 % (ref 42–52)
HGB BLD-MCNC: 12.3 G/DL (ref 14–18)
IMM GRANULOCYTES # BLD AUTO: 0.2 K/UL (ref 0–0.11)
IMM GRANULOCYTES NFR BLD AUTO: 1.2 % (ref 0–0.9)
LYMPHOCYTES # BLD AUTO: 3.04 K/UL (ref 1–4.8)
LYMPHOCYTES NFR BLD: 18.4 % (ref 22–41)
MAGNESIUM SERPL-MCNC: 2.1 MG/DL (ref 1.5–2.5)
MCH RBC QN AUTO: 31.6 PG (ref 27–33)
MCHC RBC AUTO-ENTMCNC: 32.7 G/DL (ref 32.3–36.5)
MCV RBC AUTO: 96.7 FL (ref 81.4–97.8)
MONOCYTES # BLD AUTO: 1.4 K/UL (ref 0–0.85)
MONOCYTES NFR BLD AUTO: 8.5 % (ref 0–13.4)
NEUTROPHILS # BLD AUTO: 11.75 K/UL (ref 1.82–7.42)
NEUTROPHILS NFR BLD: 71.1 % (ref 44–72)
NRBC # BLD AUTO: 0 K/UL
NRBC BLD-RTO: 0 /100 WBC (ref 0–0.2)
PCO2 BLDA: 61.9 MMHG (ref 26–37)
PH BLDA: 7.17 [PH] (ref 7.4–7.5)
PHOSPHATE SERPL-MCNC: 3.4 MG/DL (ref 2.5–4.5)
PLATELET # BLD AUTO: 217 K/UL (ref 164–446)
PMV BLD AUTO: 10 FL (ref 9–12.9)
PO2 BLDA: 280 MMHG (ref 64–87)
POTASSIUM BLD-SCNC: 2.6 MMOL/L (ref 3.6–5.5)
POTASSIUM SERPL-SCNC: 4.1 MMOL/L (ref 3.6–5.5)
PROCALCITONIN SERPL-MCNC: 0.18 NG/ML
PROT SERPL-MCNC: 5.7 G/DL (ref 6–8.2)
RBC # BLD AUTO: 3.89 M/UL (ref 4.7–6.1)
SAO2 % BLDA: 100 % (ref 93–99)
SODIUM BLD-SCNC: 123 MMOL/L (ref 135–145)
SODIUM SERPL-SCNC: 136 MMOL/L (ref 135–145)
SPECIMEN DRAWN FROM PATIENT: ABNORMAL
WBC # BLD AUTO: 16.5 K/UL (ref 4.8–10.8)

## 2023-05-25 PROCEDURE — 99233 SBSQ HOSP IP/OBS HIGH 50: CPT | Performed by: INTERNAL MEDICINE

## 2023-05-25 PROCEDURE — 99233 SBSQ HOSP IP/OBS HIGH 50: CPT | Performed by: HOSPITALIST

## 2023-05-25 PROCEDURE — 700102 HCHG RX REV CODE 250 W/ 637 OVERRIDE(OP): Performed by: INTERNAL MEDICINE

## 2023-05-25 PROCEDURE — A9270 NON-COVERED ITEM OR SERVICE: HCPCS | Performed by: INTERNAL MEDICINE

## 2023-05-25 PROCEDURE — 84100 ASSAY OF PHOSPHORUS: CPT

## 2023-05-25 PROCEDURE — 700111 HCHG RX REV CODE 636 W/ 250 OVERRIDE (IP): Performed by: INTERNAL MEDICINE

## 2023-05-25 PROCEDURE — 83735 ASSAY OF MAGNESIUM: CPT

## 2023-05-25 PROCEDURE — 700102 HCHG RX REV CODE 250 W/ 637 OVERRIDE(OP): Performed by: HOSPITALIST

## 2023-05-25 PROCEDURE — 84145 PROCALCITONIN (PCT): CPT

## 2023-05-25 PROCEDURE — A9270 NON-COVERED ITEM OR SERVICE: HCPCS | Performed by: HOSPITALIST

## 2023-05-25 PROCEDURE — 93010 ELECTROCARDIOGRAM REPORT: CPT | Mod: 76 | Performed by: INTERNAL MEDICINE

## 2023-05-25 PROCEDURE — 97535 SELF CARE MNGMENT TRAINING: CPT

## 2023-05-25 PROCEDURE — 85025 COMPLETE CBC W/AUTO DIFF WBC: CPT

## 2023-05-25 PROCEDURE — 700102 HCHG RX REV CODE 250 W/ 637 OVERRIDE(OP): Performed by: STUDENT IN AN ORGANIZED HEALTH CARE EDUCATION/TRAINING PROGRAM

## 2023-05-25 PROCEDURE — A9270 NON-COVERED ITEM OR SERVICE: HCPCS | Performed by: STUDENT IN AN ORGANIZED HEALTH CARE EDUCATION/TRAINING PROGRAM

## 2023-05-25 PROCEDURE — 97161 PT EVAL LOW COMPLEX 20 MIN: CPT

## 2023-05-25 PROCEDURE — 700101 HCHG RX REV CODE 250: Performed by: HOSPITALIST

## 2023-05-25 PROCEDURE — 93010 ELECTROCARDIOGRAM REPORT: CPT | Performed by: INTERNAL MEDICINE

## 2023-05-25 PROCEDURE — 700105 HCHG RX REV CODE 258: Performed by: HOSPITALIST

## 2023-05-25 PROCEDURE — 93005 ELECTROCARDIOGRAM TRACING: CPT | Performed by: HOSPITALIST

## 2023-05-25 PROCEDURE — 80053 COMPREHEN METABOLIC PANEL: CPT

## 2023-05-25 PROCEDURE — 770000 HCHG ROOM/CARE - INTERMEDIATE ICU *

## 2023-05-25 RX ORDER — ATORVASTATIN CALCIUM 80 MG/1
80 TABLET, FILM COATED ORAL EVERY EVENING
Status: DISCONTINUED | OUTPATIENT
Start: 2023-05-25 | End: 2023-05-29 | Stop reason: HOSPADM

## 2023-05-25 RX ORDER — SODIUM CHLORIDE, SODIUM LACTATE, POTASSIUM CHLORIDE, AND CALCIUM CHLORIDE .6; .31; .03; .02 G/100ML; G/100ML; G/100ML; G/100ML
250 INJECTION, SOLUTION INTRAVENOUS ONCE
Status: COMPLETED | OUTPATIENT
Start: 2023-05-25 | End: 2023-05-25

## 2023-05-25 RX ORDER — METHADONE HYDROCHLORIDE 10 MG/1
20 TABLET ORAL 2 TIMES DAILY
Status: DISCONTINUED | OUTPATIENT
Start: 2023-05-25 | End: 2023-05-29 | Stop reason: HOSPADM

## 2023-05-25 RX ORDER — POLYETHYLENE GLYCOL 3350 17 G/17G
1 POWDER, FOR SOLUTION ORAL
Status: DISCONTINUED | OUTPATIENT
Start: 2023-05-25 | End: 2023-05-29 | Stop reason: HOSPADM

## 2023-05-25 RX ORDER — AMIODARONE HYDROCHLORIDE 200 MG/1
400 TABLET ORAL 3 TIMES DAILY
Status: DISCONTINUED | OUTPATIENT
Start: 2023-05-25 | End: 2023-05-25

## 2023-05-25 RX ORDER — LOSARTAN POTASSIUM 25 MG/1
25 TABLET ORAL
Status: DISCONTINUED | OUTPATIENT
Start: 2023-05-26 | End: 2023-05-26

## 2023-05-25 RX ORDER — OXYCODONE HYDROCHLORIDE 5 MG/1
5 TABLET ORAL EVERY 6 HOURS PRN
Status: COMPLETED | OUTPATIENT
Start: 2023-05-25 | End: 2023-05-25

## 2023-05-25 RX ORDER — PRASUGREL 10 MG/1
10 TABLET, FILM COATED ORAL DAILY
Status: DISCONTINUED | OUTPATIENT
Start: 2023-05-26 | End: 2023-05-29 | Stop reason: HOSPADM

## 2023-05-25 RX ORDER — AMIODARONE HYDROCHLORIDE 200 MG/1
400 TABLET ORAL 3 TIMES DAILY
Status: DISCONTINUED | OUTPATIENT
Start: 2023-05-25 | End: 2023-05-28

## 2023-05-25 RX ORDER — AMOXICILLIN 250 MG
2 CAPSULE ORAL 2 TIMES DAILY
Status: DISCONTINUED | OUTPATIENT
Start: 2023-05-25 | End: 2023-05-29 | Stop reason: HOSPADM

## 2023-05-25 RX ORDER — OXYCODONE HYDROCHLORIDE 5 MG/1
5 TABLET ORAL EVERY 4 HOURS PRN
Status: DISCONTINUED | OUTPATIENT
Start: 2023-05-25 | End: 2023-05-29 | Stop reason: HOSPADM

## 2023-05-25 RX ORDER — FAMOTIDINE 20 MG/1
20 TABLET, FILM COATED ORAL EVERY 12 HOURS
Status: DISCONTINUED | OUTPATIENT
Start: 2023-05-25 | End: 2023-05-25

## 2023-05-25 RX ORDER — ACETAMINOPHEN 325 MG/1
650 TABLET ORAL EVERY 4 HOURS PRN
Status: DISCONTINUED | OUTPATIENT
Start: 2023-05-25 | End: 2023-05-29 | Stop reason: HOSPADM

## 2023-05-25 RX ORDER — DEXTROSE MONOHYDRATE 50 MG/ML
INJECTION, SOLUTION INTRAVENOUS CONTINUOUS
Status: DISCONTINUED | OUTPATIENT
Start: 2023-05-25 | End: 2023-05-25

## 2023-05-25 RX ORDER — DAPAGLIFLOZIN 10 MG/1
10 TABLET, FILM COATED ORAL DAILY
Status: DISCONTINUED | OUTPATIENT
Start: 2023-05-26 | End: 2023-05-29 | Stop reason: HOSPADM

## 2023-05-25 RX ORDER — BISACODYL 10 MG
10 SUPPOSITORY, RECTAL RECTAL
Status: DISCONTINUED | OUTPATIENT
Start: 2023-05-25 | End: 2023-05-29 | Stop reason: HOSPADM

## 2023-05-25 RX ORDER — ONDANSETRON 4 MG/1
4 TABLET, ORALLY DISINTEGRATING ORAL EVERY 4 HOURS PRN
Status: DISCONTINUED | OUTPATIENT
Start: 2023-05-25 | End: 2023-05-29 | Stop reason: HOSPADM

## 2023-05-25 RX ORDER — ASPIRIN 81 MG/1
81 TABLET, CHEWABLE ORAL DAILY
Status: DISCONTINUED | OUTPATIENT
Start: 2023-05-26 | End: 2023-05-29 | Stop reason: HOSPADM

## 2023-05-25 RX ORDER — OXYCODONE HYDROCHLORIDE 10 MG/1
10 TABLET ORAL EVERY 4 HOURS PRN
Status: DISCONTINUED | OUTPATIENT
Start: 2023-05-25 | End: 2023-05-29 | Stop reason: HOSPADM

## 2023-05-25 RX ORDER — LIDOCAINE 50 MG/G
1 PATCH TOPICAL EVERY 24 HOURS
Status: DISCONTINUED | OUTPATIENT
Start: 2023-05-25 | End: 2023-05-28

## 2023-05-25 RX ADMIN — SODIUM CHLORIDE, POTASSIUM CHLORIDE, SODIUM LACTATE AND CALCIUM CHLORIDE 250 ML: 600; 310; 30; 20 INJECTION, SOLUTION INTRAVENOUS at 16:45

## 2023-05-25 RX ADMIN — OXYCODONE HYDROCHLORIDE 10 MG: 10 TABLET ORAL at 16:59

## 2023-05-25 RX ADMIN — LIDOCAINE 1 PATCH: 50 PATCH TOPICAL at 13:06

## 2023-05-25 RX ADMIN — FUROSEMIDE 20 MG: 10 INJECTION, SOLUTION INTRAVENOUS at 06:13

## 2023-05-25 RX ADMIN — ATORVASTATIN CALCIUM 80 MG: 80 TABLET, FILM COATED ORAL at 17:17

## 2023-05-25 RX ADMIN — SENNOSIDES AND DOCUSATE SODIUM 2 TABLET: 50; 8.6 TABLET ORAL at 06:13

## 2023-05-25 RX ADMIN — ASPIRIN 81 MG 81 MG: 81 TABLET ORAL at 06:13

## 2023-05-25 RX ADMIN — SODIUM CHLORIDE, POTASSIUM CHLORIDE, SODIUM LACTATE AND CALCIUM CHLORIDE 250 ML: 600; 310; 30; 20 INJECTION, SOLUTION INTRAVENOUS at 14:00

## 2023-05-25 RX ADMIN — PRASUGREL 10 MG: 10 TABLET, FILM COATED ORAL at 06:13

## 2023-05-25 RX ADMIN — FAMOTIDINE 20 MG: 20 TABLET, FILM COATED ORAL at 06:13

## 2023-05-25 RX ADMIN — METHADONE HYDROCHLORIDE 20 MG: 10 TABLET ORAL at 06:13

## 2023-05-25 RX ADMIN — DAPAGLIFLOZIN 10 MG: 10 TABLET, FILM COATED ORAL at 06:14

## 2023-05-25 RX ADMIN — SODIUM CHLORIDE, POTASSIUM CHLORIDE, SODIUM LACTATE AND CALCIUM CHLORIDE 250 ML: 600; 310; 30; 20 INJECTION, SOLUTION INTRAVENOUS at 16:30

## 2023-05-25 RX ADMIN — OXYCODONE 5 MG: 5 TABLET ORAL at 03:33

## 2023-05-25 RX ADMIN — SODIUM CHLORIDE, POTASSIUM CHLORIDE, SODIUM LACTATE AND CALCIUM CHLORIDE 250 ML: 600; 310; 30; 20 INJECTION, SOLUTION INTRAVENOUS at 11:45

## 2023-05-25 RX ADMIN — SENNOSIDES AND DOCUSATE SODIUM 2 TABLET: 50; 8.6 TABLET ORAL at 17:17

## 2023-05-25 RX ADMIN — BENZOCAINE AND MENTHOL, UNSPECIFIED FORM 1 LOZENGE: 15; 3.6 LOZENGE ORAL at 06:15

## 2023-05-25 RX ADMIN — METHADONE HYDROCHLORIDE 20 MG: 10 TABLET ORAL at 17:17

## 2023-05-25 RX ADMIN — AMIODARONE HYDROCHLORIDE 400 MG: 200 TABLET ORAL at 19:58

## 2023-05-25 RX ADMIN — LOSARTAN POTASSIUM 25 MG: 25 TABLET, FILM COATED ORAL at 06:13

## 2023-05-25 RX ADMIN — ENOXAPARIN SODIUM 40 MG: 100 INJECTION SUBCUTANEOUS at 16:59

## 2023-05-25 RX ADMIN — METOPROLOL TARTRATE 12.5 MG: 25 TABLET, FILM COATED ORAL at 06:13

## 2023-05-25 RX ADMIN — OXYCODONE 5 MG: 5 TABLET ORAL at 10:20

## 2023-05-25 RX ADMIN — OXYCODONE HYDROCHLORIDE 10 MG: 10 TABLET ORAL at 21:02

## 2023-05-25 ASSESSMENT — PAIN DESCRIPTION - PAIN TYPE
TYPE: CHRONIC PAIN
TYPE: CHRONIC PAIN;ACUTE PAIN
TYPE: CHRONIC PAIN

## 2023-05-25 ASSESSMENT — PATIENT HEALTH QUESTIONNAIRE - PHQ9
2. FEELING DOWN, DEPRESSED, IRRITABLE, OR HOPELESS: NOT AT ALL
SUM OF ALL RESPONSES TO PHQ9 QUESTIONS 1 AND 2: 0
1. LITTLE INTEREST OR PLEASURE IN DOING THINGS: NOT AT ALL

## 2023-05-25 ASSESSMENT — ENCOUNTER SYMPTOMS
BACK PAIN: 1
PSYCHIATRIC NEGATIVE: 1
COUGH: 1
WEAKNESS: 1
MYALGIAS: 1
EYES NEGATIVE: 1
GASTROINTESTINAL NEGATIVE: 1

## 2023-05-25 NOTE — PROGRESS NOTES
Patients BP is very low. Charted the best ones. MD Cabrales made aware and MD To at bedside for it. Order bolus . Bolus started and IV no longer good. Ultrasound guided needed. Charge RN trying.

## 2023-05-25 NOTE — PROGRESS NOTES
Patient BP dropped again into the 70s, MD Cabrales made aware and ordered another 250cc LR Bolus.   Patient also then went into Afib.     Amio drip being ordered     STAT EKG pending     1620 BP went up slightly. Everything has been charted.

## 2023-05-25 NOTE — PROGRESS NOTES
Hospital Medicine Daily Progress Note    Date of Service  5/25/2023    Chief Complaint  Des Elam is a 68 y.o. male admitted 5/23/2023 with an acute inferior ST elevation myocardial infarction    Hospital Course  68 y.o. male who presented 5/23/2023 with a past med history of hypertension, dyslipidemia, chronic pain, restless leg syndrome, tobacco abuse, presents on 5/23/2023 from [place] at the [facility] in [place] with an ST elevation myocardial infarction, complicated by a cardiac arrest in route to the hospital, the patient had a total of 16 shocks, CPR, received lidocaine as well as amiodarone, the patient obtained ROSC and was neurologically intact, subsequently was intubated and brought to the Cath Lab for intervention, he underwent PCI and drug-eluting stenting x1 to his RCA with SHADIA II flow, the patient was found to have significant akinesis of the entire inferior wall and elevated LV filling pressures, was brought to the ICU sedated, on propofol, moving all 4 extremities, requiring pressor therapy for blood pressure support.  On 5/24 the patient was extubated in the morning, he had additional episode of 32nd VT, the patient later in the afternoon is reported to be further stabilized and to be transferred out of ICU to the [facility] level of care for the time being.  Patient continues to have chest pain from CPR, he denies current dizziness, no nausea vomiting, no fevers chills.  Patient was started on diuretics currently.  The patient was identified to have a ischemic cardiomyopathy now with ejection fraction of 25% with global hypokinesis.  Currently patient is afebrile, his heart rate is in the 50s, respiration is unlabored, the patient is still on nasal cannula oxygen, currently saturating in the 90s, blood pressure is soft coming off norepinephrine, currently around 100/60,  His laboratory data indicates a white cell count of 22.1, hemoglobin 13.7, platelet count 379, chemistry with  a sodium of 139, potassium 4.2, chloride 101, bicarb 22, glucose 153, BUN 26, creatinine 1.37, , , a total protein of 5.6, albumin 3.5, CPK 7065, cholesterol panel with a LDL of 83, HDL 50, urine drug screen positive for methadone, troponin level initially at 28,    Interval Problem Update  Patient seen and examined today.  Data, Medication data reviewed.  Case discussed with nursing as available.  Plan of Care reviewed with patient and notified of changes.   5/25 the patient feels much better, he still has post CPR chest wall pain, he denies dyspnea, he is currently coming off oxygen, he had a fever last night, chest x-ray without infiltrate, Pro-Preet is low, mild cough, no other infectious symptoms currently verbalized, family at the bedside, given detailed updates on the patient's current condition, data and projected hospital course.  Cardiology has implemented GDMT, currently the patient's blood pressure is not sufficient to support aggressive medication therapy.  White count 16.5, hemoglobin 12.3, platelet count 217, FTs are improving, , , echocardiogram reported with a EF of 25%, global hypokinesis with regional variation, grade 1 diastolic dysfunction,  Chest x-ray without cardiopulmonary abnormality  I have discussed this patient's plan of care and discharge plan at IDT rounds today with Case Management, Nursing, Nursing leadership, and other members of the IDT team.    Consultants/Specialty  cardiology and critical care    Code Status  Full Code    Disposition  The patient is not medically cleared for discharge to home or a post-acute facility.  Anticipate discharge to: home with close outpatient follow-up    I have placed the appropriate orders for post-discharge needs.    Review of Systems  Review of Systems   Constitutional:  Positive for malaise/fatigue.   HENT: Negative.     Eyes: Negative.    Respiratory:  Positive for cough.    Cardiovascular:  Positive for chest pain.  Negative for leg swelling.   Gastrointestinal: Negative.    Genitourinary: Negative.    Musculoskeletal:  Positive for back pain and myalgias.   Skin: Negative.    Neurological:  Positive for weakness.   Endo/Heme/Allergies: Negative.    Psychiatric/Behavioral: Negative.     All other systems reviewed and are negative.       Physical Exam  Temp:  [37.1 °C (98.8 °F)-38.9 °C (102 °F)] 37.2 °C (99 °F)  Pulse:  [51-77] 77  Resp:  [10-29] 20  BP: ()/(51-81) 78/57  SpO2:  [87 %-99 %] 90 %    Physical Exam  Vitals and nursing note reviewed.   Constitutional:       Appearance: He is well-developed. He is ill-appearing.      Comments: Pt seen and examined.   HENT:      Head: Normocephalic and atraumatic.   Eyes:      Pupils: Pupils are equal, round, and reactive to light.   Cardiovascular:      Rate and Rhythm: Regular rhythm. Bradycardia present.      Heart sounds: Normal heart sounds.   Pulmonary:      Effort: Pulmonary effort is normal.      Breath sounds: Rhonchi present.   Abdominal:      General: Bowel sounds are normal.      Palpations: Abdomen is soft.   Musculoskeletal:         General: Normal range of motion.      Cervical back: Normal range of motion and neck supple.   Skin:     General: Skin is warm and dry.   Neurological:      General: No focal deficit present.      Mental Status: He is alert and oriented to person, place, and time.   Psychiatric:         Behavior: Behavior normal.         Fluids    Intake/Output Summary (Last 24 hours) at 5/25/2023 1324  Last data filed at 5/25/2023 0947  Gross per 24 hour   Intake 1220 ml   Output 1225 ml   Net -5 ml       Laboratory  Recent Labs     05/23/23  1645 05/24/23  0330 05/25/23  0330   WBC 23.5* 22.1* 16.5*   RBC 4.77 4.22* 3.89*   HEMOGLOBIN 15.5 13.7* 12.3*   HEMATOCRIT 45.8 40.4* 37.6*   MCV 96.0 95.7 96.7   MCH 32.5 32.5 31.6   MCHC 33.8 33.9 32.7   RDW 49.1 49.8 52.4*   PLATELETCT 347 379 217   MPV 9.4 9.8 10.0     Recent Labs     05/24/23  0330  05/24/23  1725 05/25/23  0330   SODIUM 139  136 141 136   POTASSIUM 4.2  3.9 4.2 4.1   CHLORIDE 101  99 106 102   CO2 22 23 23 24   GLUCOSE 153*  155* 109* 112*   BUN 26*  26* 30* 21   CREATININE 1.37  1.24 0.96 0.64   CALCIUM 8.6  8.3* 8.2* 8.3*     Recent Labs     05/23/23  1645   APTT 28.1   INR 1.05         Recent Labs     05/24/23  0330   TRIGLYCERIDE 171*   HDL 50   LDL 83       Imaging  DX-CHEST-PORTABLE (1 VIEW)   Final Result      No acute cardiopulmonary abnormality.         EC-ECHOCARDIOGRAM COMPLETE W/ CONT   Final Result      DX-CHEST-PORTABLE (1 VIEW)   Final Result      1.  Low lung volumes without definite acute cardiopulmonary abnormality.      DX-ABDOMEN FOR TUBE PLACEMENT   Final Result      Nasogastric tube tip terminates within the stomach.      DX-CHEST-PORTABLE (1 VIEW)   Final Result      1.  No acute cardiac or pulmonary abnormalities are identified.   2.  Endotracheal tube tip at the level of the clavicular heads      CL-LEFT HEART CATHETERIZATION WITH POSSIBLE INTERVENTION    (Results Pending)        Assessment/Plan  * STEMI (ST elevation myocardial infarction) (HCC)- (present on admission)  Assessment & Plan  Underwent PCI with SABINO x1 to his RCA with SHADIA II flow.  He was noted to have akinesis of the entire inferior wall and elevated LV filling pressures consistent with decompensated heart failure.   Echocardiogram confirms EF of 25%, global hypokinesis  - monitor and replete lytes appropriately  - prasugrel, aspirin  - statin, high intensity  Initiate beta-blockade once tolerating  Patient has been titrated off Levophed  Forced diuresis as tolerated  Postcardiac follow-up, follow-up echocardiogram and a certain interval  GDMT as tolerated    Cardiac arrest (HCC)- (present on admission)  Assessment & Plan  VT arrest due to RCA occlusion - underwent multiple rounds of defibrillation, CPR, ACLS   now s/p PCI to RCA  Monitor closely, optimize medical therapy  Patient with  significant reduction of ejection fraction,  Cardiology following  Stabilized to transfer out of CICU    Acute respiratory failure with hypoxia (HCC)- (present on admission)  Assessment & Plan  Intubated periarrest  Extubated on 5/24 following cath and now on NC - wean as tolerated  Pulmonary toilet, monitor    TESS (acute kidney injury) (HCC)- (present on admission)  Assessment & Plan  Likely multifactorial from cardiac arrest/hypoperfusion and potentially cardiorenal now given e/o heart failure  Monitor  closely, avoid nephrotoxins      Hypotension due to hypovolemia  Assessment & Plan  Patient with poor systolic function,  Relative bradycardia secondary to acute MI  Gentle fluid resuscitation  Patient currently does not have much room for goal-directed medical therapy titration    Tobacco abuse disorder- (present on admission)  Assessment & Plan  Strongly suggested to engage in lifelong smoking cessation    Restless leg syndrome- (present on admission)  Assessment & Plan  Existing, symptomatic treatment    Transaminitis- (present on admission)  Assessment & Plan  Following cardiac arrest - likely from ischemia and component of congestion from decompensated heart failure  Monitor closely, avoid offending agents      Leukocytosis- (present on admission)  Assessment & Plan  No fevers or clinical e/o infection  Likely reactive, following cardiac arrest  Monitor closely,  Low threshold to treat for possible pulmonary infection given the patient's prolonged cardiac resuscitation    Hypokalemia- (present on admission)  Assessment & Plan  Replete to maintain >4  Monitor and replete mag    Hypercholesteremia- (present on admission)  Assessment & Plan  Maximized statin therapy in form of atorvastatin 80 mg daily      HTN (hypertension)- (present on admission)  Assessment & Plan  Holding home antihypertensives secondary to hypotension post intervention  Monitor closely and reinstitute as indicated and tolerated    Chronic  pain- (present on admission)  Assessment & Plan  Restart home methadone at home dosing  PRN oxy    VT (ventricular tachycardia) (HCC)- (present on admission)  Assessment & Plan  S/p cardioversion on 5/23 multiple times - had one episode of NSVT for 30 seconds today w/o hemodynamic compromise.  - optimized lytes  - monitor  S/p administration of amiodarone, lidocaine  Monitor closely,    Plan  IV fluid boluses to restore sufficient blood pressure  Hold GDMT at this time, the patient is not tolerating  Slow progression into ambulation, mobility  Close telemetry monitoring  Optimize electrolytes  See orders  Patient is has a high medical complexity, complex decision making and is at high risk for complication, morbidity, and mortality.  My total time spent caring for the patient on the day of the encounter was 56 minutes.   This does not include time spent on separately billable procedures/tests.    VTE prophylaxis: enoxaparin ppx    I have performed a physical exam and reviewed and updated ROS and Plan today (5/25/2023). In review of yesterday's note (5/24/2023), there are no changes except as documented above.      Please note that this dictation was created using voice recognition software. I have made every reasonable attempt to correct obvious errors, but I expect that there are errors of grammar and possibly context that I did not discover before finalizing the note.

## 2023-05-25 NOTE — CARE PLAN
The patient is Watcher - Medium risk of patient condition declining or worsening    Shift Goals  Clinical Goals: tolerate diet, hemodynamically stable  Patient Goals: pain control, sleep  Family Goals: comfort    Progress made toward(s) clinical / shift goals:    Problem: Knowledge Deficit - Standard  Goal: Patient and family/care givers will demonstrate understanding of plan of care, disease process/condition, diagnostic tests and medications  Outcome: Progressing     Problem: Pain - Standard  Goal: Alleviation of pain or a reduction in pain to the patient’s comfort goal  Outcome: Progressing     Problem: Skin Integrity  Goal: Skin integrity is maintained or improved  Outcome: Progressing     Problem: Fall Risk  Goal: Patient will remain free from falls  Outcome: Progressing       Patient is not progressing towards the following goals:

## 2023-05-25 NOTE — CARE PLAN
The patient is Watcher - Medium risk of patient condition declining or worsening    Shift Goals  Clinical Goals: hemodynamically stable, ambulate, decrease O2 demand, decrease pain  Patient Goals: sleep, pain control  Family Goals: calvin    Progress made toward(s) clinical / shift goals:    Patient is aware to use call bell when he needs to ambulate, especially since his BP is so low. Patient is encouraged and eager to ambulate.   Problem: Knowledge Deficit - Standard  Goal: Patient and family/care givers will demonstrate understanding of plan of care, disease process/condition, diagnostic tests and medications  Outcome: Progressing     Problem: Skin Integrity  Goal: Skin integrity is maintained or improved  Outcome: Progressing     Problem: Fall Risk  Goal: Patient will remain free from falls  Outcome: Progressing       Patient is not progressing towards the following goals:  Patient is still needing O2. Patient BP Is low and bolus is given. Patient is asymptomatic but states he is very tired from not sleeping the last 2 nights.Patient has chronic back pain and normally medications dont fully make it feel better or bring it to a 0.     Problem: Pain - Standard  Goal: Alleviation of pain or a reduction in pain to the patient’s comfort goal  Outcome: Not Progressing

## 2023-05-25 NOTE — THERAPY
Physical Therapy   Initial Evaluation     Patient Name: Des Elam  Age:  68 y.o., Sex:  male  Medical Record #: [id number]  Today's Date: 5/25/2023     Precautions  Precautions: Fall Risk    Assessment  Patient is 68 y.o. male admitted with STEMI complicated by cardiac arrest with V fib en route, CPR performed.  Patient had ongoing ventricular arrhythmias in ER, defibrillated twice.  Patient now s/p PCI.  PMH includes HTN, HLD, chronic pain, and tobacco use.  Patient was seen for cardiac rehab evaluation.  Provided extensive education to patient and his family including HR max, talk test, RPE scale, signs/symptoms of failure response, and modifiable risk factors.  Unable to mobilize patient due to low BP & fatigue, unable to hemodynamically tolerate ambulation.  Will follow up as able during inpatient stay to further assess self pacing & hemodynamic response to activity.  Recommend outpatient cardiac rehab.        Plan    Physical Therapy Initial Treatment Plan   Treatment Plan : Bed Mobility, Equipment, Gait Training, Neuro Re-Education / Balance, Self Care / Home Evaluation, Stair Training, Therapeutic Activities, Therapeutic Exercise  Treatment Frequency: 3 Times per Week  Duration: Until Therapy Goals Met    DC Equipment Recommendations: None  Discharge Recommendations: (Outpatient cardiac rehab)       Objective     05/25/23 1141   Precautions   Precautions Fall Risk   Pain 0 - 10 Group   Therapist Pain Assessment Post Activity Pain Same as Prior to Activity;Nurse Notified;0   Prior Living Situation   Prior Services Home-Independent   Housing / Facility 2 Story House   Steps Into Home 2   Steps In Home (flight of stairs)   Lives with - Patient's Self Care Capacity Spouse   Comments Pt plans to stay with his dtr in [place] for a short time as he & his wife live rurally   Prior Level of Functional Mobility   Bed Mobility Independent   Transfer Status Independent   Ambulation Independent   Ambulation  Distance community ambulator   Assistive Devices Used None   Stairs Independent   Cognition    Cognition / Consciousness WDL   Level of Consciousness Alert   Comments Very pleasant & cooperative   Short Term Goals    Short Term Goal # 1 Pt will demonstrate appropriate self pacing with mobility to maintain appropriate hemodynamics within 6 visits to progress activity tolerance   Short Term Goal # 2 Pt will internalize education re: talk test & RPE scale in 6 visits to demonstrate appropriate self pacing   Education Group   Education Provided Role of Physical Therapist   Role of Physical Therapist Patient Response Patient;Acceptance;Explanation;Verbal Demonstration   Additional Comments Cardiac rehab education   Physical Therapy Initial Treatment Plan    Treatment Plan  Bed Mobility;Equipment;Gait Training;Neuro Re-Education / Balance;Self Care / Home Evaluation;Stair Training;Therapeutic Activities;Therapeutic Exercise   Treatment Frequency 3 Times per Week   Duration Until Therapy Goals Met   Anticipated Discharge Equipment and Recommendations   DC Equipment Recommendations None   Discharge Recommendations (Outpatient cardiac rehab)

## 2023-05-25 NOTE — DISCHARGE PLANNING
Care Transition Team Assessment    CM RN met with wife and pt at bedside. Wife stating pt on Ra at [facility]. Pt does not use any DME but does have walker and crutches at home in [place]. Wife stating lives with pt at [address] [place] [address].PT seen an APRN at [facility] office, APRN Mag [place] [phone]. Family stating they are going to look for a cardiac rehab in home town for pt to follow up with..     Information Source  Orientation Level: Oriented X4         Elopement Risk  Legal Hold: No  Ambulatory or Self Mobile in Wheelchair: No-Not an Elopement Risk  Elopement Risk: Not at Risk for Elopement    Interdisciplinary Discharge Planning  Lives with - Patient's Self Care Capacity: Spouse, Alone and Able to Care For Self  Patient or legal guardian wants to designate a caregiver: No  Support Systems: Children, Family Member(s), Spouse / Significant Other  Housing / Facility: 2 Story House  Durable Medical Equipment: Not Applicable    Discharge Preparedness  What is your plan after discharge?: Home with help  What are your discharge supports?: Spouse  Prior Functional Level: Ambulatory    Functional Assesment  Prior Functional Level: Ambulatory           Domestic Abuse  Have you ever been the victim of abuse or violence?: No  Physical Abuse or Sexual Abuse: No  Verbal Abuse or Emotional Abuse: No  Possible Abuse/Neglect Reported to:: Not Applicable         Discharge Risks or Barriers  Patient risk factors: Vulnerable adult    Anticipated Discharge Information  Discharge Disposition: Discharged to home/self care (01)

## 2023-05-25 NOTE — PROGRESS NOTES
Monitor Summary:   SB SR 43-72   18/11/43  Formerly [facility] Big PACs  BBB  1x 30sec. run VT

## 2023-05-25 NOTE — ASSESSMENT & PLAN NOTE
Patient with poor systolic function,  Relative bradycardia secondary to acute MI  Gentle fluid resuscitation  Patient currently does not have much room for goal-directed medical therapy titration

## 2023-05-25 NOTE — PROGRESS NOTES
Cardiology Progress Note:    Emil Pena M.D.  Date & Time note created:    5/25/2023   1:43 PM     Referring MD:  Dr. Cummings    Patient ID:   Name:             Des Elam     YOB: 1955  Age:                 68 y.o.  male   MRN:               [id number]                                                             Chief Complaint / Reason for consult:  STEMI, cardiac arrest.    History of Present Illness:    This is a 68 years old man without prior cardiac problems, presented to the hospital via EMS after he was found to be in cardiac arrest with ventricular fibrillation, CPR was performed, defibrillation was performed, patient was noted to have ST elevation on inferior leads with reciprocal changes on field EKG.  Patient presented to the hospital with ongoing ventricular arrhythmias.  He was defibrillated twice in the ER.  He was intubated in the ER.  He did have good mentation when he was awake after defibrillation.  I personally interpreted the EKG tracing on field which showed inferior ST elevation with reciprocal changes suggestive of inferior myocardial infarct acute events.  Per his wife's report, he does not have prior history of cardiac problems, no prior cardiac procedure or surgery.  Patient does have hypertension problem.     In the ER, patient goes in and out of ventricular arrhythmias.  He received CPR, lidocaine loading, magnesium loading and defibrillation x2 in the ER.  He was never hypotensive.    He is s/p RCA stent.    LVEF of 25 % with global hypokinesis. No significant valvular disease. I have independently interpreted and reviewed echocardiogram's actual images.     Overnight events:  Hypotension is seen.    Review of Systems:      Constitutional: Denies fevers, Denies weight changes  Eyes: Denies changes in vision, no eye pain  Ears/Nose/Throat/Mouth: Denies nasal congestion or sore throat   Cardiovascular: yes chest pain, no palpitations   Respiratory: no shortness of  breath , Denies cough  Gastrointestinal/Hepatic: Denies abdominal pain, nausea, vomiting, diarrhea, constipation or GI bleeding   Genitourinary: Denies dysuria or frequency  Musculoskeletal/Rheum: Denies  joint pain and swelling   Skin: Denies rash  Neurological: Denies headache, confusion, memory loss or focal weakness/parasthesias  Psychiatric: denies mood disorder   Endocrine: Rivka thyroid problems  Heme/Oncology/Lymph Nodes: Denies enlarged lymph nodes, denies brusing or known bleeding disorder  All other systems were reviewed and are negative (AMA/CMS criteria)                Past Medical History:   Past Medical History:   Diagnosis Date    Dyslipidemia     Hypertension     RLS (restless legs syndrome)     Tobacco abuse      Active Hospital Problems    Diagnosis     Hypotension due to hypovolemia [I95.89, E86.1]     Leukocytosis [D72.829]     Transaminitis [R74.01]     TESS (acute kidney injury) ([facility]) [N17.9]     Restless leg syndrome [G25.81]     Tobacco abuse disorder [Z72.0]     STEMI (ST elevation myocardial infarction) ([facility]) [I21.3]     VT (ventricular tachycardia) ([facility]) [I47.20]     Acute respiratory failure with hypoxia ([facility]) [J96.01]     Cardiac arrest ([facility]) [I46.9]     Chronic pain [G89.29]     HTN (hypertension) [I10]     Hypercholesteremia [E78.00]     Hypokalemia [E87.6]        Past Surgical History:  Past Surgical History:   Procedure Laterality Date    KNEE ARTHROPLASTY TOTAL Bilateral        Hospital Medications:    Current Facility-Administered Medications:     atorvastatin (LIPITOR) tablet 80 mg, 80 mg, Oral, Q EVENING, Dax Cabrales M.D.    [START ON 5/26/2023] aspirin (ASA) chewable tab 81 mg, 81 mg, Oral, DAILY, Dax Cabrales M.D.    [START ON 5/26/2023] prasugrel (EFFIENT) tablet 10 mg, 10 mg, Oral, DAILY, Dax Cabrales M.D.    senna-docusate (PERICOLACE or SENOKOT S) 8.6-50 MG per tablet 2 Tablet, 2 Tablet, Oral, BID **AND** polyethylene glycol/lytes (MIRALAX) PACKET 1  Packet, 1 Packet, Oral, QDAY PRN **AND** magnesium hydroxide (MILK OF MAGNESIA) suspension 30 mL, 30 mL, Oral, QDAY PRN **AND** bisacodyl (DULCOLAX) suppository 10 mg, 10 mg, Rectal, QDAY PRN, Dax Cabrales M.D.    metoprolol tartrate (LOPRESSOR) tablet 12.5 mg, 12.5 mg, Oral, TWICE DAILY, Dax Cabrales M.D.    methadone (DOLOPHINE) tablet 20 mg, 20 mg, Oral, BID, Dax Cabrales M.D.    [Held by provider] losartan (COZAAR) tablet 25 mg, 25 mg, Oral, Q DAY, Dax Cabrales M.D.    [Held by provider] dapagliflozin propanediol (Farxiga) tablet 10 mg, 10 mg, Oral, DAILY, Dax Cabrales M.D.    acetaminophen (Tylenol) tablet 650 mg, 650 mg, Oral, Q4HRS PRN, Dax Cabrales M.D.    ondansetron (ZOFRAN ODT) dispertab 4 mg, 4 mg, Oral, Q4HRS PRN, Dax Cabrales M.D.    oxyCODONE immediate-release (ROXICODONE) tablet 5 mg, 5 mg, Oral, Q4HRS PRN **OR** oxyCODONE immediate release (ROXICODONE) tablet 10 mg, 10 mg, Oral, Q4HRS PRN, Dax Cabrales M.D.    lidocaine (LIDODERM) 5 % 1 Patch, 1 Patch, Transdermal, Q24HR, Dax Cabrales M.D., 1 Patch at 05/25/23 1306    lactated ringer BOLUS infusion, 250 mL, Intravenous, Once, Dax Cabrales M.D.    benzocaine-menthol (Cepacol) lozenge 1 Lozenge, 1 Lozenge, Mouth/Throat, Q2HRS PRN, Gael Reid M.D., 1 Lozenge at 05/25/23 0615    [Held by provider] furosemide (LASIX) injection 20 mg, 20 mg, Intravenous, Q DAY, Emil Pena M.D., 20 mg at 05/25/23 0613    Respiratory Therapy Consult, , Nebulization, Continuous RT, Prosper Cummings Jr., D.O.    enoxaparin (Lovenox) inj 40 mg, 40 mg, Subcutaneous, DAILY AT 1800, MIRELLA Quarles Jr..OMagdaleno, 40 mg at 05/24/23 1708    hydrALAZINE (APRESOLINE) injection 10 mg, 10 mg, Intravenous, Q4HRS PRN, MIRELLA Quarles Jr..O.    ondansetron (ZOFRAN) syringe/vial injection 4 mg, 4 mg, Intravenous, Q4HRS PRN, MIRELLA Quarles Jr..O.    Current Outpatient Medications:  Medications Prior to  "Admission   Medication Sig Dispense Refill Last Dose    losartan (COZAAR) 50 MG Tab Take 100 mg by mouth every day. Unsure on dose frequency   5/23/2023 at 0800    methadone (DOLOPHINE) 5 MG Tab Take 20 mg by mouth. 20mg in the morning, 25mg in the afternoon for chronic back pain and restless leg syndrome   5/23/2023 at 0800    [DISCONTINUED] atorvastatin (LIPITOR) 20 MG Tab Take 100 mg by mouth every evening.   5/22/2023 at 2100    [DISCONTINUED] hydroCHLOROthiazide (HYDRODIURIL) 25 MG Tab Take 25 mg by mouth every day. Unsure on dose frequency   5/23/2023 at 0800       Medication Allergy:  Allergies   Allergen Reactions    Neomycin-Bacitracin-Polymyxin        Family History:  Family History   Problem Relation Age of Onset    Abdominal aortic aneurysm Mother     Dementia Father        Social History:  Social History     Socioeconomic History    Marital status:      Spouse name: Not on file    Number of children: Not on file    Years of education: Not on file    Highest education level: Not on file   Occupational History    Not on file   Tobacco Use    Smoking status: Every Day     Packs/day: 0.50     Years: 30.00     Pack years: 15.00     Types: Cigarettes    Smokeless tobacco: Not on file   Substance and Sexual Activity    Alcohol use: Not Currently    Drug use: Not Currently    Sexual activity: Not on file   Other Topics Concern    Not on file   Social History Narrative    Not on file     Social Determinants of Health     Financial Resource Strain: Not on file   Food Insecurity: Not on file   Transportation Needs: Not on file   Physical Activity: Not on file   Stress: Not on file   Social Connections: Not on file   Intimate Partner Violence: Not on file   Housing Stability: Not on file         Physical Exam:  Vitals/ General Appearance:   Weight/BMI: Body mass index is 35.49 kg/m².  BP (!) 78/57   Pulse 77   Temp 37.2 °C (99 °F) (Temporal)   Resp 20   Ht 1.854 m (6' 0.99\")   Wt 122 kg (268 lb 15.4 oz) "   SpO2 90%   Vitals:    05/25/23 0630 05/25/23 0800 05/25/23 1105 05/25/23 1200   BP: 103/64 114/67 (!) 85/51 (!) 78/57   Pulse: (!) 54 64 (!) 51 77   Resp: 14 (!) 27 20    Temp:  37.2 °C (99 °F) 37.2 °C (99 °F) 37.2 °C (99 °F)   TempSrc:  Temporal  Temporal   SpO2: 98% 97% 99% 90%   Weight:       Height:         Oxygen Therapy:  Pulse Oximetry: 90 %, O2 (LPM): 2, O2 Delivery Device: Nasal Cannula    Constitutional:   Well developed, Well nourished, No acute distress  HENMT:  Normocephalic, Atraumatic, Oropharynx moist mucous membranes, No oral exudates, Nose normal.  No thyromegaly.  Eyes:  EOMI, Conjunctiva normal, No discharge.  Neck:  Normal range of motion, No cervical tenderness,  no JVD.  Cardiovascular:  Normal heart rate, Normal rhythm, No murmurs, No rubs, No gallops.   Extremitites with intact distal pulses, no cyanosis, or edema.  Lungs:  Normal breath sounds, breath sounds clear to auscultation bilaterally,  no rales, no rhonchi, no wheezing.   Abdomen: Bowel sounds normal, Soft, No tenderness, No guarding, No rebound, No masses, No hepatosplenomegaly.  Skin: Warm, Dry, No erythema, No rash, no induration.  Neurologic: Alert & oriented x 3, No focal deficits noted, cranial nerves II through X are intact.  Psychiatric: Affect normal, Judgment normal, Mood normal.      MDM (Data Review):     Records reviewed and summarized in current documentation    Lab Data Review:  Recent Results (from the past 24 hour(s))   Basic Metabolic Panel (BMP)    Collection Time: 05/24/23  5:25 PM   Result Value Ref Range    Sodium 141 135 - 145 mmol/L    Potassium 4.2 3.6 - 5.5 mmol/L    Chloride 106 96 - 112 mmol/L    Co2 23 20 - 33 mmol/L    Glucose 109 (H) 65 - 99 mg/dL    Bun 30 (H) 8 - 22 mg/dL    Creatinine 0.96 0.50 - 1.40 mg/dL    Calcium 8.2 (L) 8.5 - 10.5 mg/dL    Anion Gap 12.0 7.0 - 16.0   ESTIMATED GFR    Collection Time: 05/24/23  5:25 PM   Result Value Ref Range    GFR (CKD-EPI) 85 >60 mL/min/1.73 m 2   CBC  with Differential    Collection Time: 05/25/23  3:30 AM   Result Value Ref Range    WBC 16.5 (H) 4.8 - 10.8 K/uL    RBC 3.89 (L) 4.70 - 6.10 M/uL    Hemoglobin 12.3 (L) 14.0 - 18.0 g/dL    Hematocrit 37.6 (L) 42.0 - 52.0 %    MCV 96.7 81.4 - 97.8 fL    MCH 31.6 27.0 - 33.0 pg    MCHC 32.7 32.3 - 36.5 g/dL    RDW 52.4 (H) 35.9 - 50.0 fL    Platelet Count 217 164 - 446 K/uL    MPV 10.0 9.0 - 12.9 fL    Neutrophils-Polys 71.10 44.00 - 72.00 %    Lymphocytes 18.40 (L) 22.00 - 41.00 %    Monocytes 8.50 0.00 - 13.40 %    Eosinophils 0.60 0.00 - 6.90 %    Basophils 0.20 0.00 - 1.80 %    Immature Granulocytes 1.20 (H) 0.00 - 0.90 %    Nucleated RBC 0.00 0.00 - 0.20 /100 WBC    Neutrophils (Absolute) 11.75 (H) 1.82 - 7.42 K/uL    Lymphs (Absolute) 3.04 1.00 - 4.80 K/uL    Monos (Absolute) 1.40 (H) 0.00 - 0.85 K/uL    Eos (Absolute) 0.10 0.00 - 0.51 K/uL    Baso (Absolute) 0.04 0.00 - 0.12 K/uL    Immature Granulocytes (abs) 0.20 (H) 0.00 - 0.11 K/uL    NRBC (Absolute) 0.00 K/uL   Magnesium    Collection Time: 05/25/23  3:30 AM   Result Value Ref Range    Magnesium 2.1 1.5 - 2.5 mg/dL   Phosphorus    Collection Time: 05/25/23  3:30 AM   Result Value Ref Range    Phosphorus 3.4 2.5 - 4.5 mg/dL   Comp Metabolic Panel    Collection Time: 05/25/23  3:30 AM   Result Value Ref Range    Sodium 136 135 - 145 mmol/L    Potassium 4.1 3.6 - 5.5 mmol/L    Chloride 102 96 - 112 mmol/L    Co2 24 20 - 33 mmol/L    Anion Gap 10.0 7.0 - 16.0    Glucose 112 (H) 65 - 99 mg/dL    Bun 21 8 - 22 mg/dL    Creatinine 0.64 0.50 - 1.40 mg/dL    Calcium 8.3 (L) 8.5 - 10.5 mg/dL    AST(SGOT) 384 (H) 12 - 45 U/L    ALT(SGPT) 247 (H) 2 - 50 U/L    Alkaline Phosphatase 54 30 - 99 U/L    Total Bilirubin 0.8 0.1 - 1.5 mg/dL    Albumin 3.3 3.2 - 4.9 g/dL    Total Protein 5.7 (L) 6.0 - 8.2 g/dL    Globulin 2.4 1.9 - 3.5 g/dL    A-G Ratio 1.4 g/dL   PROCALCITONIN    Collection Time: 05/25/23  3:30 AM   Result Value Ref Range    Procalcitonin 0.18 <0.25 ng/mL    CORRECTED CALCIUM    Collection Time: 05/25/23  3:30 AM   Result Value Ref Range    Correct Calcium 8.9 8.5 - 10.5 mg/dL   ESTIMATED GFR    Collection Time: 05/25/23  3:30 AM   Result Value Ref Range    GFR (CKD-EPI) 102 >60 mL/min/1.73 m 2       Imaging/Procedures Review:    Chest Xray:  Reviewed    EKG:   As in HPI.     MDM (Assessment and Plan):     Active Hospital Problems    Diagnosis     Hypotension due to hypovolemia [I95.89, E86.1]     Leukocytosis [D72.829]     Transaminitis [R74.01]     TESS (acute kidney injury) ([facility]) [N17.9]     Restless leg syndrome [G25.81]     Tobacco abuse disorder [Z72.0]     STEMI (ST elevation myocardial infarction) ([facility]) [I21.3]     VT (ventricular tachycardia) ([facility]) [I47.20]     Acute respiratory failure with hypoxia ([facility]) [J96.01]     Cardiac arrest ([facility]) [I46.9]     Chronic pain [G89.29]     HTN (hypertension) [I10]     Hypercholesteremia [E78.00]     Hypokalemia [E87.6]          Ischemic Cardiomyopathy:  Chronically illed condition which requires ongoing close monitoring and treatment to improve survival rate along with decreasing risk of clinical decompensation sudden cardiac death and hospitalization.    Likely dehydrated, agreed with IV Fluid hydration.     Dry weight is 268 lbs.    Once BP better, will continue Toprol XL 25 mg daily, losartan 25 mg daily.     Stop Furosemide 40 mg IV daily.     Will add aldactone antagonist once ARB and BB therapy are optimized.    Based on recent data on SGLT2 and heart failure with reduced ejection fraction, patient will be benefited from Farxiga 10 mg p.o. once a day for further reduction in mortality and hospitalization with absolute risk reduction of 4.9%.  Therefore, I will start patient on Farxiga 10 mg p.o. once a day.  Risks and benefits were explained to patient and patient has agreed to proceed.    We will consider ICD placement for primary prevention in 3 months if left ventricular systolic function remains less than 35%  after optimization of evidence based heart failure medical regimen.    Coronary arterial disease s/p RCA stent:  Continue Prasugrel, asa, BB, ARB  and statin at current dose.    Hypertension:  Optimize control using cardiomyopathy medical regimen as well.    Hyperlipidemia:  Optimize statin as within guidelines of CAD treatment as above.       Of note, during the care of this patient, I spent a significant amount of time explaining the nature of the disease process, reviewing all possible imaging studies, blood test results to patient.  The overall care of this patient require a higher level of care than usual due to the multiple comorbidities along with ongoing issues of congestive heart failure that put this patient at risk for sudden cardiac death, increased mortality, and hospitalization.  This patient also requires close monitoring with at least monthly blood test to monitor renal function and electrolytes due to the ongoing dynamic changes of medical therapy titration protocol to ensure optimal benefits for the overall care of this patient.        Thank you for referring this patient to our cardiology service.  We will follow patient with you.      Emil Pena MD.   Cardiology Inpatient Service.  [facility].  [phone].  [place], [place].

## 2023-05-25 NOTE — DIETARY
"Nutrition Services: Day 2 of admit.  Des Elam is a 68 y.o. male with admitting DX of STEMI (ST elevation myocardial infarction) (HCC).    Consult received Cardiac Rehab Diet Education.  S/p Aspiration thrombectomy and percutaneous coronary intervention to the thrombotically occluded proximal to mid right coronary artery 5/23.     BMI 35.49, Class II obesity.   on 5/24, but pt had been on propofol during intubation.  HA1c WNL.    Visited pt this afternoon for diet education. Pt denied any specific concerns, but did mention needing \"to lose weight\" and was receptive to the handouts that RD provided. No F/u needs identified @ this time. RD available as further indicated.   "

## 2023-05-26 LAB
ALBUMIN SERPL BCP-MCNC: 3.4 G/DL (ref 3.2–4.9)
ALBUMIN/GLOB SERPL: 1.4 G/DL
ALP SERPL-CCNC: 55 U/L (ref 30–99)
ALT SERPL-CCNC: 218 U/L (ref 2–50)
ANION GAP SERPL CALC-SCNC: 8 MMOL/L (ref 7–16)
AST SERPL-CCNC: 185 U/L (ref 12–45)
BASOPHILS # BLD AUTO: 0.2 % (ref 0–1.8)
BASOPHILS # BLD: 0.03 K/UL (ref 0–0.12)
BILIRUB SERPL-MCNC: 0.8 MG/DL (ref 0.1–1.5)
BUN SERPL-MCNC: 21 MG/DL (ref 8–22)
CALCIUM ALBUM COR SERPL-MCNC: 9.3 MG/DL (ref 8.5–10.5)
CALCIUM SERPL-MCNC: 8.8 MG/DL (ref 8.5–10.5)
CHLORIDE SERPL-SCNC: 100 MMOL/L (ref 96–112)
CK SERPL-CCNC: 951 U/L (ref 0–154)
CO2 SERPL-SCNC: 28 MMOL/L (ref 20–33)
CREAT SERPL-MCNC: 0.63 MG/DL (ref 0.5–1.4)
EKG IMPRESSION: NORMAL
EOSINOPHIL # BLD AUTO: 0.14 K/UL (ref 0–0.51)
EOSINOPHIL NFR BLD: 1.1 % (ref 0–6.9)
ERYTHROCYTE [DISTWIDTH] IN BLOOD BY AUTOMATED COUNT: 49.6 FL (ref 35.9–50)
GFR SERPLBLD CREATININE-BSD FMLA CKD-EPI: 103 ML/MIN/1.73 M 2
GLOBULIN SER CALC-MCNC: 2.4 G/DL (ref 1.9–3.5)
GLUCOSE SERPL-MCNC: 105 MG/DL (ref 65–99)
HCT VFR BLD AUTO: 35.9 % (ref 42–52)
HGB BLD-MCNC: 12 G/DL (ref 14–18)
IMM GRANULOCYTES # BLD AUTO: 0.19 K/UL (ref 0–0.11)
IMM GRANULOCYTES NFR BLD AUTO: 1.5 % (ref 0–0.9)
LYMPHOCYTES # BLD AUTO: 2.91 K/UL (ref 1–4.8)
LYMPHOCYTES NFR BLD: 22.4 % (ref 22–41)
MAGNESIUM SERPL-MCNC: 2.1 MG/DL (ref 1.5–2.5)
MCH RBC QN AUTO: 32.3 PG (ref 27–33)
MCHC RBC AUTO-ENTMCNC: 33.4 G/DL (ref 32.3–36.5)
MCV RBC AUTO: 96.5 FL (ref 81.4–97.8)
MONOCYTES # BLD AUTO: 1.15 K/UL (ref 0–0.85)
MONOCYTES NFR BLD AUTO: 8.8 % (ref 0–13.4)
NEUTROPHILS # BLD AUTO: 8.58 K/UL (ref 1.82–7.42)
NEUTROPHILS NFR BLD: 66 % (ref 44–72)
NRBC # BLD AUTO: 0 K/UL
NRBC BLD-RTO: 0 /100 WBC (ref 0–0.2)
PHOSPHATE SERPL-MCNC: 3 MG/DL (ref 2.5–4.5)
PLATELET # BLD AUTO: 209 K/UL (ref 164–446)
PMV BLD AUTO: 10.2 FL (ref 9–12.9)
POTASSIUM SERPL-SCNC: 4 MMOL/L (ref 3.6–5.5)
PROT SERPL-MCNC: 5.8 G/DL (ref 6–8.2)
RBC # BLD AUTO: 3.72 M/UL (ref 4.7–6.1)
SODIUM SERPL-SCNC: 136 MMOL/L (ref 135–145)
WBC # BLD AUTO: 13 K/UL (ref 4.8–10.8)

## 2023-05-26 PROCEDURE — 84100 ASSAY OF PHOSPHORUS: CPT

## 2023-05-26 PROCEDURE — 85025 COMPLETE CBC W/AUTO DIFF WBC: CPT

## 2023-05-26 PROCEDURE — 99233 SBSQ HOSP IP/OBS HIGH 50: CPT | Performed by: HOSPITALIST

## 2023-05-26 PROCEDURE — 700111 HCHG RX REV CODE 636 W/ 250 OVERRIDE (IP): Performed by: INTERNAL MEDICINE

## 2023-05-26 PROCEDURE — A9270 NON-COVERED ITEM OR SERVICE: HCPCS | Performed by: HOSPITALIST

## 2023-05-26 PROCEDURE — 80053 COMPREHEN METABOLIC PANEL: CPT

## 2023-05-26 PROCEDURE — 770020 HCHG ROOM/CARE - TELE (206)

## 2023-05-26 PROCEDURE — 83735 ASSAY OF MAGNESIUM: CPT

## 2023-05-26 PROCEDURE — 93005 ELECTROCARDIOGRAM TRACING: CPT | Performed by: INTERNAL MEDICINE

## 2023-05-26 PROCEDURE — 700101 HCHG RX REV CODE 250: Performed by: HOSPITALIST

## 2023-05-26 PROCEDURE — 93010 ELECTROCARDIOGRAM REPORT: CPT | Performed by: INTERNAL MEDICINE

## 2023-05-26 PROCEDURE — 700102 HCHG RX REV CODE 250 W/ 637 OVERRIDE(OP): Performed by: HOSPITALIST

## 2023-05-26 PROCEDURE — 82550 ASSAY OF CK (CPK): CPT

## 2023-05-26 PROCEDURE — 99233 SBSQ HOSP IP/OBS HIGH 50: CPT | Performed by: INTERNAL MEDICINE

## 2023-05-26 RX ORDER — LOSARTAN POTASSIUM 25 MG/1
25 TABLET ORAL EVERY EVENING
Status: DISCONTINUED | OUTPATIENT
Start: 2023-05-26 | End: 2023-05-29 | Stop reason: HOSPADM

## 2023-05-26 RX ORDER — ALPRAZOLAM 0.25 MG/1
0.25 TABLET ORAL 4 TIMES DAILY PRN
Status: DISCONTINUED | OUTPATIENT
Start: 2023-05-26 | End: 2023-05-29 | Stop reason: HOSPADM

## 2023-05-26 RX ADMIN — AMIODARONE HYDROCHLORIDE 400 MG: 200 TABLET ORAL at 12:26

## 2023-05-26 RX ADMIN — METHADONE HYDROCHLORIDE 20 MG: 10 TABLET ORAL at 18:12

## 2023-05-26 RX ADMIN — METOPROLOL TARTRATE 12.5 MG: 25 TABLET, FILM COATED ORAL at 18:11

## 2023-05-26 RX ADMIN — ENOXAPARIN SODIUM 40 MG: 100 INJECTION SUBCUTANEOUS at 18:11

## 2023-05-26 RX ADMIN — ALPRAZOLAM 0.25 MG: 0.25 TABLET ORAL at 22:20

## 2023-05-26 RX ADMIN — APIXABAN 5 MG: 5 TABLET, FILM COATED ORAL at 18:12

## 2023-05-26 RX ADMIN — OXYCODONE HYDROCHLORIDE 10 MG: 10 TABLET ORAL at 20:35

## 2023-05-26 RX ADMIN — ATORVASTATIN CALCIUM 80 MG: 80 TABLET, FILM COATED ORAL at 18:12

## 2023-05-26 RX ADMIN — LOSARTAN POTASSIUM 25 MG: 25 TABLET, FILM COATED ORAL at 18:11

## 2023-05-26 RX ADMIN — ALPRAZOLAM 0.25 MG: 0.25 TABLET ORAL at 10:01

## 2023-05-26 RX ADMIN — LIDOCAINE 1 PATCH: 50 PATCH TOPICAL at 12:26

## 2023-05-26 RX ADMIN — ALPRAZOLAM 0.25 MG: 0.25 TABLET ORAL at 16:32

## 2023-05-26 RX ADMIN — OXYCODONE HYDROCHLORIDE 10 MG: 10 TABLET ORAL at 01:05

## 2023-05-26 RX ADMIN — METHADONE HYDROCHLORIDE 20 MG: 10 TABLET ORAL at 05:29

## 2023-05-26 RX ADMIN — SENNOSIDES AND DOCUSATE SODIUM 2 TABLET: 50; 8.6 TABLET ORAL at 18:12

## 2023-05-26 RX ADMIN — ASPIRIN 81 MG 81 MG: 81 TABLET ORAL at 05:29

## 2023-05-26 RX ADMIN — PRASUGREL 10 MG: 10 TABLET, FILM COATED ORAL at 05:29

## 2023-05-26 RX ADMIN — OXYCODONE HYDROCHLORIDE 10 MG: 10 TABLET ORAL at 13:26

## 2023-05-26 RX ADMIN — AMIODARONE HYDROCHLORIDE 400 MG: 200 TABLET ORAL at 18:12

## 2023-05-26 RX ADMIN — AMIODARONE HYDROCHLORIDE 400 MG: 200 TABLET ORAL at 05:29

## 2023-05-26 RX ADMIN — SENNOSIDES AND DOCUSATE SODIUM 2 TABLET: 50; 8.6 TABLET ORAL at 05:29

## 2023-05-26 ASSESSMENT — PAIN DESCRIPTION - PAIN TYPE
TYPE: CHRONIC PAIN
TYPE: CHRONIC PAIN
TYPE: ACUTE PAIN
TYPE: ACUTE PAIN
TYPE: CHRONIC PAIN
TYPE: CHRONIC PAIN
TYPE: ACUTE PAIN

## 2023-05-26 ASSESSMENT — CHA2DS2 SCORE
AGE 65 TO 74: YES
AGE 75 OR GREATER: NO
VASCULAR DISEASE: YES
HYPERTENSION: YES
PRIOR STROKE OR TIA OR THROMBOEMBOLISM: NO
DIABETES: NO
CHF OR LEFT VENTRICULAR DYSFUNCTION: YES
CHA2DS2 VASC SCORE: 4
SEX: MALE

## 2023-05-26 ASSESSMENT — ENCOUNTER SYMPTOMS
PALPITATIONS: 1
MYALGIAS: 1
BACK PAIN: 1
GASTROINTESTINAL NEGATIVE: 1
COUGH: 1
NERVOUS/ANXIOUS: 1
WEAKNESS: 1
EYES NEGATIVE: 1

## 2023-05-26 ASSESSMENT — FIBROSIS 4 INDEX
FIB4 SCORE: 7.77
FIB4 SCORE: 4.14

## 2023-05-26 NOTE — PROGRESS NOTES
Hospital Medicine Daily Progress Note    Date of Service  5/26/2023    Chief Complaint  [place]am is a 68 y.o. male admitted 5/23/2023 with an acute inferior ST elevation myocardial infarction    Hospital Course  68 y.o. male who presented 5/23/2023 with a past med history of hypertension, dyslipidemia, chronic pain, restless leg syndrome, tobacco abuse, presents on 5/23/2023 from [place] at the [facility] in [place] with an ST elevation myocardial infarction, complicated by a cardiac arrest in route to the hospital, the patient had a total of 16 shocks, CPR, received lidocaine as well as amiodarone, the patient obtained ROSC and was neurologically intact, subsequently was intubated and brought to the Cath Lab for intervention, he underwent PCI and drug-eluting stenting x1 to his RCA with SHADIA II flow, the patient was found to have significant akinesis of the entire inferior wall and elevated LV filling pressures, was brought to the ICU sedated, on propofol, moving all 4 extremities, requiring pressor therapy for blood pressure support.  On 5/24 the patient was extubated in the morning, he had additional episode of 32nd VT, the patient later in the afternoon is reported to be further stabilized and to be transferred out of ICU to the [facility] level of care for the time being.  Patient continues to have chest pain from CPR, he denies current dizziness, no nausea vomiting, no fevers chills.  Patient was started on diuretics currently.  The patient was identified to have a ischemic cardiomyopathy now with ejection fraction of 25% with global hypokinesis.  Currently patient is afebrile, his heart rate is in the 50s, respiration is unlabored, the patient is still on nasal cannula oxygen, currently saturating in the 90s, blood pressure is soft coming off norepinephrine, currently around 100/60,  His laboratory data indicates a white cell count of 22.1, hemoglobin 13.7, platelet count 379, chemistry with  a sodium of 139, potassium 4.2, chloride 101, bicarb 22, glucose 153, BUN 26, creatinine 1.37, , , a total protein of 5.6, albumin 3.5, CPK 7065, cholesterol panel with a LDL of 83, HDL 50, urine drug screen positive for methadone, troponin level initially at 28,    Interval Problem Update  Patient seen and examined today.  Data, Medication data reviewed.  Case discussed with nursing as available.  Plan of Care reviewed with patient and notified of changes.   5/25 the patient feels much better, he still has post CPR chest wall pain, he denies dyspnea, he is currently coming off oxygen, he had a fever last night, chest x-ray without infiltrate, Pro-Preet is low, mild cough, no other infectious symptoms currently verbalized, family at the bedside, given detailed updates on the patient's current condition, data and projected hospital course.  Cardiology has implemented GDMT, currently the patient's blood pressure is not sufficient to support aggressive medication therapy.  White count 16.5, hemoglobin 12.3, platelet count 217, FTs are improving, , , echocardiogram reported with a EF of 25%, global hypokinesis with regional variation, grade 1 diastolic dysfunction,  Chest x-ray without cardiopulmonary abnormality  5/26 the patient is anxious, uncomfortable, he is unable to sleep in the bed, he has some chronic back pain, he denies currently withdrawing from tobacco, no further chest pain, continues currently in rate controlled atrial fibrillation, afebrile, heart rate in the 70s to 100s, the patient is currently on room air, 97%, blood pressure is still on the low side in the 95-1 teens range over 60s, laboratory data with improved white cell count of 13, hemoglobin 12.0, platelet count 209, chemistry with potassium 4.0, glucose 105, calcium 8.8, improving LFTs down to 185 and 218 respectively, CPK improved to 951, magnesium 2.1,  I have discussed this patient's plan of care and discharge plan  at IDT rounds today with Case Management, Nursing, Nursing leadership, and other members of the IDT team.    Consultants/Specialty  cardiology and critical care    Code Status  Full Code    Disposition  The patient is not medically cleared for discharge to home or a post-acute facility.  Anticipate discharge to: home with close outpatient follow-up    I have placed the appropriate orders for post-discharge needs.    Review of Systems  Review of Systems   Constitutional:  Positive for malaise/fatigue.   HENT: Negative.     Eyes: Negative.    Respiratory:  Positive for cough.    Cardiovascular:  Positive for chest pain and palpitations. Negative for leg swelling.   Gastrointestinal: Negative.    Genitourinary: Negative.    Musculoskeletal:  Positive for back pain and myalgias.   Skin: Negative.    Neurological:  Positive for weakness.   Endo/Heme/Allergies: Negative.    Psychiatric/Behavioral:  The patient is nervous/anxious.    All other systems reviewed and are negative.       Physical Exam  Temp:  [36.6 °C (97.8 °F)-37 °C (98.6 °F)] 36.6 °C (97.8 °F)  Pulse:  [] 70  Resp:  [16-20] 19  BP: ()/(51-79) 95/65  SpO2:  [93 %-99 %] 97 %    Physical Exam  Vitals and nursing note reviewed.   Constitutional:       Appearance: He is well-developed. He is ill-appearing.      Comments: Pt seen and examined.   HENT:      Head: Normocephalic and atraumatic.   Eyes:      Pupils: Pupils are equal, round, and reactive to light.   Cardiovascular:      Rate and Rhythm: Tachycardia present. Rhythm irregular.      Heart sounds: Normal heart sounds.   Pulmonary:      Effort: Pulmonary effort is normal.      Breath sounds: Rhonchi present.   Abdominal:      General: Bowel sounds are normal.      Palpations: Abdomen is soft.   Musculoskeletal:         General: Normal range of motion.      Cervical back: Normal range of motion and neck supple.   Skin:     General: Skin is warm and dry.   Neurological:      General: No focal  deficit present.      Mental Status: He is alert and oriented to person, place, and time.   Psychiatric:         Behavior: Behavior normal.         Fluids    Intake/Output Summary (Last 24 hours) at 5/26/2023 1518  Last data filed at 5/26/2023 0500  Gross per 24 hour   Intake 240 ml   Output 1625 ml   Net -1385 ml         Laboratory  Recent Labs     05/24/23  0330 05/25/23  0330 05/26/23  0436   WBC 22.1* 16.5* 13.0*   RBC 4.22* 3.89* 3.72*   HEMOGLOBIN 13.7* 12.3* 12.0*   HEMATOCRIT 40.4* 37.6* 35.9*   MCV 95.7 96.7 96.5   MCH 32.5 31.6 32.3   MCHC 33.9 32.7 33.4   RDW 49.8 52.4* 49.6   PLATELETCT 379 217 209   MPV 9.8 10.0 10.2       Recent Labs     05/24/23  1725 05/25/23  0330 05/26/23  0436   SODIUM 141 136 136   POTASSIUM 4.2 4.1 4.0   CHLORIDE 106 102 100   CO2 23 24 28   GLUCOSE 109* 112* 105*   BUN 30* 21 21   CREATININE 0.96 0.64 0.63   CALCIUM 8.2* 8.3* 8.8       Recent Labs     05/23/23  1645   APTT 28.1   INR 1.05           Recent Labs     05/24/23  0330   TRIGLYCERIDE 171*   HDL 50   LDL 83         Imaging  DX-CHEST-PORTABLE (1 VIEW)   Final Result      No acute cardiopulmonary abnormality.         EC-ECHOCARDIOGRAM COMPLETE W/ CONT   Final Result      DX-CHEST-PORTABLE (1 VIEW)   Final Result      1.  Low lung volumes without definite acute cardiopulmonary abnormality.      DX-ABDOMEN FOR TUBE PLACEMENT   Final Result      Nasogastric tube tip terminates within the stomach.      DX-CHEST-PORTABLE (1 VIEW)   Final Result      1.  No acute cardiac or pulmonary abnormalities are identified.   2.  Endotracheal tube tip at the level of the clavicular heads      CL-LEFT HEART CATHETERIZATION WITH POSSIBLE INTERVENTION    (Results Pending)          Assessment/Plan  * STEMI (ST elevation myocardial infarction) (HCC)- (present on admission)  Assessment & Plan  Underwent PCI with SABINO x1 to his RCA with SHADIA II flow.  He was noted to have akinesis of the entire inferior wall and elevated LV filling pressures  consistent with decompensated heart failure.   Echocardiogram confirms EF of 25%, global hypokinesis  - monitor and replete lytes appropriately  - prasugrel, aspirin  - statin, high intensity  Initiate beta-blockade once tolerating  Patient has been titrated off Levophed  Forced diuresis as tolerated  Postcardiac follow-up, follow-up echocardiogram and a certain interval  GDMT as tolerated    Atrial fibrillation (HCC)  Assessment & Plan  Likely new onset, secondary to patient's ischemic event  Ongoing antiarrhythmic therapy with amiodarone  Adjust anticoagulation accordingly, given the patient's poor ejection fraction, new onset  Cardiology is opting against cardioversion  Close telemetry monitoring    Cardiac arrest (HCC)- (present on admission)  Assessment & Plan  VT arrest due to RCA occlusion - underwent multiple rounds of defibrillation, CPR, ACLS   now s/p PCI to RCA  Monitor closely, optimize medical therapy  Patient with significant reduction of ejection fraction,  Cardiology following  Stabilized to transfer out of CICU    Acute respiratory failure with hypoxia (HCC)- (present on admission)  Assessment & Plan  Intubated periarrest  Extubated on 5/24 following cath and now on NC - wean as tolerated  Pulmonary toilet, monitor    TESS (acute kidney injury) (HCC)- (present on admission)  Assessment & Plan  Likely multifactorial from cardiac arrest/hypoperfusion and potentially cardiorenal now given e/o heart failure  Monitor  closely, avoid nephrotoxins      Hypotension due to hypovolemia  Assessment & Plan  Patient with poor systolic function,  Relative bradycardia secondary to acute MI  Gentle fluid resuscitation  Patient currently does not have much room for goal-directed medical therapy titration    Tobacco abuse disorder- (present on admission)  Assessment & Plan  Strongly suggested to engage in lifelong smoking cessation    Restless leg syndrome- (present on admission)  Assessment & Plan  Existing,  symptomatic treatment    Transaminitis- (present on admission)  Assessment & Plan  Following cardiac arrest - likely from ischemia and component of congestion from decompensated heart failure  Monitor closely, avoid offending agents      Leukocytosis- (present on admission)  Assessment & Plan  No fevers or clinical e/o infection  Likely reactive, following cardiac arrest  Monitor closely,  Low threshold to treat for possible pulmonary infection given the patient's prolonged cardiac resuscitation    Hypokalemia- (present on admission)  Assessment & Plan  Replete to maintain >4  Monitor and replete mag    Hypercholesteremia- (present on admission)  Assessment & Plan  Maximized statin therapy in form of atorvastatin 80 mg daily      HTN (hypertension)- (present on admission)  Assessment & Plan  Holding home antihypertensives secondary to hypotension post intervention  Monitor closely and reinstitute as indicated and tolerated    Chronic pain- (present on admission)  Assessment & Plan  Restart home methadone at home dosing  PRN oxy    VT (ventricular tachycardia) (HCC)- (present on admission)  Assessment & Plan  S/p cardioversion on 5/23 multiple times - had one episode of NSVT for 30 seconds today w/o hemodynamic compromise.  - optimized lytes  - monitor  S/p administration of amiodarone, lidocaine  Monitor closely,    Plan  Continue amiodarone as per cardiology  Initiate full anticoagulation, close monitoring  Titrate medication very slowly as the patient still has marginal blood pressures  Anxiolytics  Hold GDMT at this time, the patient is not tolerating  Slow progression into ambulation, mobility  Close telemetry monitoring  Optimize electrolytes  See orders  Patient is has a high medical complexity, complex decision making and is at high risk for complication, morbidity, and mortality.  My total time spent caring for the patient on the day of the encounter was 58 minutes.   This does not include time spent on  separately billable procedures/tests.    VTE prophylaxis: enoxaparin ppx    I have performed a physical exam and reviewed and updated ROS and Plan today (5/26/2023). In review of yesterday's note (5/25/2023), there are no changes except as documented above.      Please note that this dictation was created using voice recognition software. I have made every reasonable attempt to correct obvious errors, but I expect that there are errors of grammar and possibly context that I did not discover before finalizing the note.

## 2023-05-26 NOTE — PROGRESS NOTES
Report given to RUFUS Sanchez.     Charge RN contacted MD To in regards to amio drip and or bolus to be ordered and started on patient. The only thing ordered was 1730 amio 400mg PO . Passed that along to RN that it was not given and awaiting on the drip to be ordered. Night Charge RN is also aware of this situation.     Patient is still in Afib controlled.

## 2023-05-26 NOTE — CARE PLAN
The patient is Stable - Low risk of patient condition declining or worsening    Shift Goals  Clinical Goals: hemodynamic stability  Patient Goals: rest, pain control  Family Goals: updates    Progress made toward(s) clinical / shift goals:      Problem: Knowledge Deficit - Standard  Goal: Patient and family/care givers will demonstrate understanding of plan of care, disease process/condition, diagnostic tests and medications  Outcome: Progressing     Problem: Pain - Standard  Goal: Alleviation of pain or a reduction in pain to the patient’s comfort goal  Outcome: Progressing     Problem: Skin Integrity  Goal: Skin integrity is maintained or improved  Outcome: Progressing     Problem: Fall Risk  Goal: Patient will remain free from falls  Outcome: Progressing       Patient is not progressing towards the following goals:

## 2023-05-26 NOTE — PROGRESS NOTES
Cardiology Progress Note:    Emil Pena M.D.  Date & Time note created:    5/26/2023   10:42 AM     Referring MD:  Dr. Cummings    Patient ID:   Name:             Des Elam     YOB: 1955  Age:                 68 y.o.  male   MRN:               [id number]                                                             Chief Complaint / Reason for consult:  STEMI, cardiac arrest.    History of Present Illness:    This is a 68 years old man without prior cardiac problems, presented to the hospital via EMS after he was found to be in cardiac arrest with ventricular fibrillation, CPR was performed, defibrillation was performed, patient was noted to have ST elevation on inferior leads with reciprocal changes on field EKG.  Patient presented to the hospital with ongoing ventricular arrhythmias.  He was defibrillated twice in the ER.  He was intubated in the ER.  He did have good mentation when he was awake after defibrillation.  I personally interpreted the EKG tracing on field which showed inferior ST elevation with reciprocal changes suggestive of inferior myocardial infarct acute events.  Per his wife's report, he does not have prior history of cardiac problems, no prior cardiac procedure or surgery.  Patient does have hypertension problem.     In the ER, patient goes in and out of ventricular arrhythmias.  He received CPR, lidocaine loading, magnesium loading and defibrillation x2 in the ER.  He was never hypotensive.    He is s/p RCA stent.    LVEF of 25 % with global hypokinesis. No significant valvular disease. I have independently interpreted and reviewed echocardiogram's actual images.     Overnight events:  Hypotension is seen.  Went into atrial fibrillation. Amiodarone was started.    Review of Systems:      Constitutional: Denies fevers, Denies weight changes  Eyes: Denies changes in vision, no eye pain  Ears/Nose/Throat/Mouth: Denies nasal congestion or sore throat   Cardiovascular: yes  chest pain, no palpitations   Respiratory: no shortness of breath , Denies cough  Gastrointestinal/Hepatic: Denies abdominal pain, nausea, vomiting, diarrhea, constipation or GI bleeding   Genitourinary: Denies dysuria or frequency  Musculoskeletal/Rheum: Denies  joint pain and swelling   Skin: Denies rash  Neurological: Denies headache, confusion, memory loss or focal weakness/parasthesias  Psychiatric: denies mood disorder   Endocrine: Rivka thyroid problems  Heme/Oncology/Lymph Nodes: Denies enlarged lymph nodes, denies brusing or known bleeding disorder  All other systems were reviewed and are negative (AMA/CMS criteria)                Past Medical History:   Past Medical History:   Diagnosis Date    Dyslipidemia     Hypertension     RLS (restless legs syndrome)     Tobacco abuse      Active Hospital Problems    Diagnosis     Hypotension due to hypovolemia [I95.89, E86.1]     Leukocytosis [D72.829]     Transaminitis [R74.01]     TESS (acute kidney injury) ([facility]) [N17.9]     Restless leg syndrome [G25.81]     Tobacco abuse disorder [Z72.0]     STEMI (ST elevation myocardial infarction) ([facility]) [I21.3]     VT (ventricular tachycardia) ([facility]) [I47.20]     Acute respiratory failure with hypoxia ([facility]) [J96.01]     Cardiac arrest ([facility]) [I46.9]     Chronic pain [G89.29]     HTN (hypertension) [I10]     Hypercholesteremia [E78.00]     Hypokalemia [E87.6]        Past Surgical History:  Past Surgical History:   Procedure Laterality Date    KNEE ARTHROPLASTY TOTAL Bilateral        Hospital Medications:    Current Facility-Administered Medications:     ALPRAZolam (XANAX) tablet 0.25 mg, 0.25 mg, Oral, 4X/DAY PRN, Dax Cabrales M.D., 0.25 mg at 05/26/23 1001    atorvastatin (LIPITOR) tablet 80 mg, 80 mg, Oral, Q EVENING, Dax Cabrales M.D., 80 mg at 05/25/23 1717    aspirin (ASA) chewable tab 81 mg, 81 mg, Oral, DAILY, Dax Cabrales M.D., 81 mg at 05/26/23 0529    prasugrel (EFFIENT) tablet 10 mg, 10 mg, Oral,  DAILY, Dax Cabrlaes M.D., 10 mg at 05/26/23 0529    senna-docusate (PERICOLACE or SENOKOT S) 8.6-50 MG per tablet 2 Tablet, 2 Tablet, Oral, BID, 2 Tablet at 05/26/23 0529 **AND** polyethylene glycol/lytes (MIRALAX) PACKET 1 Packet, 1 Packet, Oral, QDAY PRN **AND** magnesium hydroxide (MILK OF MAGNESIA) suspension 30 mL, 30 mL, Oral, QDAY PRN **AND** bisacodyl (DULCOLAX) suppository 10 mg, 10 mg, Rectal, QDAY PRN, Dax Cabrales M.D.    metoprolol tartrate (LOPRESSOR) tablet 12.5 mg, 12.5 mg, Oral, TWICE DAILY, Dax Cabrales M.D.    methadone (DOLOPHINE) tablet 20 mg, 20 mg, Oral, BID, Dax Cabrales M.D., 20 mg at 05/26/23 0529    [Held by provider] losartan (COZAAR) tablet 25 mg, 25 mg, Oral, Q DAY, Dax Cabrales M.D.    [Held by provider] dapagliflozin propanediol (Farxiga) tablet 10 mg, 10 mg, Oral, DAILY, Dax Cabrales M.D.    acetaminophen (Tylenol) tablet 650 mg, 650 mg, Oral, Q4HRS PRN, Dax Cabrales M.D.    ondansetron (ZOFRAN ODT) dispertab 4 mg, 4 mg, Oral, Q4HRS PRN, Dax Cabrales M.D.    oxyCODONE immediate-release (ROXICODONE) tablet 5 mg, 5 mg, Oral, Q4HRS PRN **OR** oxyCODONE immediate release (ROXICODONE) tablet 10 mg, 10 mg, Oral, Q4HRS PRN, Dax Cabrales M.D., 10 mg at 05/26/23 0105    lidocaine (LIDODERM) 5 % 1 Patch, 1 Patch, Transdermal, Q24HR, Dax Cabrales M.D., 1 Patch at 05/25/23 1306    amiodarone (Cordarone) tablet 400 mg, 400 mg, Oral, TID, Dax Cabrales M.D., 400 mg at 05/26/23 0529    benzocaine-menthol (Cepacol) lozenge 1 Lozenge, 1 Lozenge, Mouth/Throat, Q2HRS PRN, Gael Reid M.D., 1 Lozenge at 05/25/23 0615    Respiratory Therapy Consult, , Nebulization, Continuous RT, Prosper Cummings Jr., D.O.    enoxaparin (Lovenox) inj 40 mg, 40 mg, Subcutaneous, DAILY AT 1800, Prosper Cummings Jr., D.O., 40 mg at 05/25/23 1659    hydrALAZINE (APRESOLINE) injection 10 mg, 10 mg, Intravenous, Q4HRS PRN, Prosper Cummings Jr., D.O.     ondansetron (ZOFRAN) syringe/vial injection 4 mg, 4 mg, Intravenous, Q4HRS PRN, Prosper Cummings Jr., D.O.    Current Outpatient Medications:  Medications Prior to Admission   Medication Sig Dispense Refill Last Dose    losartan (COZAAR) 50 MG Tab Take 100 mg by mouth every day. Unsure on dose frequency   5/23/2023 at 0800    methadone (DOLOPHINE) 5 MG Tab Take 20 mg by mouth. 20mg in the morning, 25mg in the afternoon for chronic back pain and restless leg syndrome   5/23/2023 at 0800    [DISCONTINUED] atorvastatin (LIPITOR) 20 MG Tab Take 100 mg by mouth every evening.   5/22/2023 at 2100    [DISCONTINUED] hydroCHLOROthiazide (HYDRODIURIL) 25 MG Tab Take 25 mg by mouth every day. Unsure on dose frequency   5/23/2023 at 0800       Medication Allergy:  Allergies   Allergen Reactions    Neomycin-Bacitracin-Polymyxin        Family History:  Family History   Problem Relation Age of Onset    Abdominal aortic aneurysm Mother     Dementia Father        Social History:  Social History     Socioeconomic History    Marital status:      Spouse name: Not on file    Number of children: Not on file    Years of education: Not on file    Highest education level: Not on file   Occupational History    Not on file   Tobacco Use    Smoking status: Every Day     Packs/day: 0.50     Years: 30.00     Pack years: 15.00     Types: Cigarettes    Smokeless tobacco: Not on file   Substance and Sexual Activity    Alcohol use: Not Currently    Drug use: Not Currently    Sexual activity: Not on file   Other Topics Concern    Not on file   Social History Narrative    Not on file     Social Determinants of Health     Financial Resource Strain: Not on file   Food Insecurity: Not on file   Transportation Needs: Not on file   Physical Activity: Not on file   Stress: Not on file   Social Connections: Not on file   Intimate Partner Violence: Not on file   Housing Stability: Not on file         Physical Exam:  Vitals/ General Appearance:  "  Weight/BMI: Body mass index is 34.55 kg/m².  BP 91/65   Pulse 80   Temp 36.7 °C (98.1 °F) (Temporal)   Resp 17   Ht 1.854 m (6' 1\")   Wt 119 kg (261 lb 14.5 oz)   SpO2 97%   Vitals:    23 0529 23 0600 23 0629 23 0800   BP:  102/66  91/65   Pulse:  76  80   Resp: 16 16 16 17   Temp:    36.7 °C (98.1 °F)   TempSrc:    Temporal   SpO2:  95%  97%   Weight:       Height:         Oxygen Therapy:  Pulse Oximetry: 97 %, O2 (LPM): 2, O2 Delivery Device: None - Room Air    Constitutional:   Well developed, Well nourished, No acute distress  HENMT:  Normocephalic, Atraumatic, Oropharynx moist mucous membranes, No oral exudates, Nose normal.  No thyromegaly.  Eyes:  EOMI, Conjunctiva normal, No discharge.  Neck:  Normal range of motion, No cervical tenderness,  no JVD.  Cardiovascular:  Normal heart rate, Normal rhythm, No murmurs, No rubs, No gallops.   Extremitites with intact distal pulses, no cyanosis, or edema.  Lungs:  Normal breath sounds, breath sounds clear to auscultation bilaterally,  no rales, no rhonchi, no wheezing.   Abdomen: Bowel sounds normal, Soft, No tenderness, No guarding, No rebound, No masses, No hepatosplenomegaly.  Skin: Warm, Dry, No erythema, No rash, no induration.  Neurologic: Alert & oriented x 3, No focal deficits noted, cranial nerves II through X are intact.  Psychiatric: Affect normal, Judgment normal, Mood normal.      MDM (Data Review):     Records reviewed and summarized in current documentation    Lab Data Review:  Recent Results (from the past 24 hour(s))   EKG    Collection Time: 23  4:29 PM   Result Value Ref Range    Report       [facility]    Test Date:  2023  Pt Name:    KAL PRETTY                  Department: [facility]  MRN:        [id number]                      [place]       [facility]  Gender:     Male                         Technician: RUDDY  :        1955                   Requested By:EVELYN GALVAN  Order #:    [id number]      "               Reading MD: Zac Jasmine MD    Measurements  Intervals                                Axis  Rate:       81                           P:  UT:                                      QRS:        13  QRSD:       107                          T:          -36  QT:         360  QTc:        418    Interpretive Statements  Atrial fibrillation  Inferior infarct, acute or recent  Electronically Signed On 5- 16:55:59 PDT by Zac Jasmine MD     CBC with Differential    Collection Time: 05/26/23  4:36 AM   Result Value Ref Range    WBC 13.0 (H) 4.8 - 10.8 K/uL    RBC 3.72 (L) 4.70 - 6.10 M/uL    Hemoglobin 12.0 (L) 14.0 - 18.0 g/dL    Hematocrit 35.9 (L) 42.0 - 52.0 %    MCV 96.5 81.4 - 97.8 fL    MCH 32.3 27.0 - 33.0 pg    MCHC 33.4 32.3 - 36.5 g/dL    RDW 49.6 35.9 - 50.0 fL    Platelet Count 209 164 - 446 K/uL    MPV 10.2 9.0 - 12.9 fL    Neutrophils-Polys 66.00 44.00 - 72.00 %    Lymphocytes 22.40 22.00 - 41.00 %    Monocytes 8.80 0.00 - 13.40 %    Eosinophils 1.10 0.00 - 6.90 %    Basophils 0.20 0.00 - 1.80 %    Immature Granulocytes 1.50 (H) 0.00 - 0.90 %    Nucleated RBC 0.00 0.00 - 0.20 /100 WBC    Neutrophils (Absolute) 8.58 (H) 1.82 - 7.42 K/uL    Lymphs (Absolute) 2.91 1.00 - 4.80 K/uL    Monos (Absolute) 1.15 (H) 0.00 - 0.85 K/uL    Eos (Absolute) 0.14 0.00 - 0.51 K/uL    Baso (Absolute) 0.03 0.00 - 0.12 K/uL    Immature Granulocytes (abs) 0.19 (H) 0.00 - 0.11 K/uL    NRBC (Absolute) 0.00 K/uL   Magnesium    Collection Time: 05/26/23  4:36 AM   Result Value Ref Range    Magnesium 2.1 1.5 - 2.5 mg/dL   Phosphorus    Collection Time: 05/26/23  4:36 AM   Result Value Ref Range    Phosphorus 3.0 2.5 - 4.5 mg/dL   Comp Metabolic Panel    Collection Time: 05/26/23  4:36 AM   Result Value Ref Range    Sodium 136 135 - 145 mmol/L    Potassium 4.0 3.6 - 5.5 mmol/L    Chloride 100 96 - 112 mmol/L    Co2 28 20 - 33 mmol/L    Anion Gap 8.0 7.0 - 16.0    Glucose 105 (H) 65 - 99 mg/dL    Bun 21 8 - 22  mg/dL    Creatinine 0.63 0.50 - 1.40 mg/dL    Calcium 8.8 8.5 - 10.5 mg/dL    AST(SGOT) 185 (H) 12 - 45 U/L    ALT(SGPT) 218 (H) 2 - 50 U/L    Alkaline Phosphatase 55 30 - 99 U/L    Total Bilirubin 0.8 0.1 - 1.5 mg/dL    Albumin 3.4 3.2 - 4.9 g/dL    Total Protein 5.8 (L) 6.0 - 8.2 g/dL    Globulin 2.4 1.9 - 3.5 g/dL    A-G Ratio 1.4 g/dL   CREATINE KINASE    Collection Time: 05/26/23  4:36 AM   Result Value Ref Range    CPK Total 951 (H) 0 - 154 U/L   CORRECTED CALCIUM    Collection Time: 05/26/23  4:36 AM   Result Value Ref Range    Correct Calcium 9.3 8.5 - 10.5 mg/dL   ESTIMATED GFR    Collection Time: 05/26/23  4:36 AM   Result Value Ref Range    GFR (CKD-EPI) 103 >60 mL/min/1.73 m 2       Imaging/Procedures Review:    Chest Xray:  Reviewed    EKG:   As in HPI.     MDM (Assessment and Plan):     Active Hospital Problems    Diagnosis     Hypotension due to hypovolemia [I95.89, E86.1]     Leukocytosis [D72.829]     Transaminitis [R74.01]     TESS (acute kidney injury) ([facility]) [N17.9]     Restless leg syndrome [G25.81]     Tobacco abuse disorder [Z72.0]     STEMI (ST elevation myocardial infarction) ([facility]) [I21.3]     VT (ventricular tachycardia) ([facility]) [I47.20]     Acute respiratory failure with hypoxia ([facility]) [J96.01]     Cardiac arrest ([facility]) [I46.9]     Chronic pain [G89.29]     HTN (hypertension) [I10]     Hypercholesteremia [E78.00]     Hypokalemia [E87.6]          Ischemic Cardiomyopathy:  Chronically illed condition which requires ongoing close monitoring and treatment to improve survival rate along with decreasing risk of clinical decompensation sudden cardiac death and hospitalization.    Likely dehydrated, agreed with IV Fluid hydration.     Dry weight is 261 lbs.    Once BP better, will restart Toprol XL 25 mg daily, losartan 25 mg daily.     Stop Furosemide 40 mg IV daily.     Will add aldactone antagonist once ARB and BB therapy are optimized.    Based on recent data on SGLT2 and heart failure with reduced  ejection fraction, patient will be benefited from Farxiga 10 mg p.o. once a day for further reduction in mortality and hospitalization with absolute risk reduction of 4.9%.  Therefore, I will start patient on Farxiga 10 mg p.o. once a day.  Risks and benefits were explained to patient and patient has agreed to proceed.    We will consider ICD placement for primary prevention in 3 months if left ventricular systolic function remains less than 35% after optimization of evidence based heart failure medical regimen.    Coronary arterial disease s/p RCA stent:  Continue Prasugrel, asa, BB, ARB  and statin at current dose.    For atrial fibrillation, rate is controlled. Will continue Amiodarone 400 mg TID x 1 week then 400 mg BID x 1 week and 400 mg daily then 200 mg BID x1 then 200 mg daily.    Hypertension:  Optimize control using cardiomyopathy medical regimen as well.    Hyperlipidemia:  Optimize statin as within guidelines of CAD treatment as above.       Of note, during the care of this patient, I spent a significant amount of time explaining the nature of the disease process, reviewing all possible imaging studies, blood test results to patient.  The overall care of this patient require a higher level of care than usual due to the multiple comorbidities along with ongoing issues of congestive heart failure that put this patient at risk for sudden cardiac death, increased mortality, and hospitalization.  This patient also requires close monitoring with at least monthly blood test to monitor renal function and electrolytes due to the ongoing dynamic changes of medical therapy titration protocol to ensure optimal benefits for the overall care of this patient.        Thank you for referring this patient to our cardiology service.  We will follow patient with you.      Emil Pena MD.   Cardiology Inpatient Service.  [facility].  [phone].  [place], [place].

## 2023-05-26 NOTE — ASSESSMENT & PLAN NOTE
Likely new onset, secondary to patient's ischemic event  Ongoing antiarrhythmic therapy with amiodarone  Adjust anticoagulation accordingly, given the patient's poor ejection fraction, new onset  Close telemetry monitoring  Patient currently converted to sinus rhythm on 5/27

## 2023-05-27 LAB
ALBUMIN SERPL BCP-MCNC: 3.3 G/DL (ref 3.2–4.9)
ALBUMIN/GLOB SERPL: 1.3 G/DL
ALP SERPL-CCNC: 64 U/L (ref 30–99)
ALT SERPL-CCNC: 164 U/L (ref 2–50)
ANION GAP SERPL CALC-SCNC: 13 MMOL/L (ref 7–16)
AST SERPL-CCNC: 86 U/L (ref 12–45)
BASOPHILS # BLD AUTO: 0.5 % (ref 0–1.8)
BASOPHILS # BLD: 0.06 K/UL (ref 0–0.12)
BILIRUB SERPL-MCNC: 0.7 MG/DL (ref 0.1–1.5)
BUN SERPL-MCNC: 20 MG/DL (ref 8–22)
CALCIUM ALBUM COR SERPL-MCNC: 9.3 MG/DL (ref 8.5–10.5)
CALCIUM SERPL-MCNC: 8.7 MG/DL (ref 8.5–10.5)
CHLORIDE SERPL-SCNC: 98 MMOL/L (ref 96–112)
CO2 SERPL-SCNC: 25 MMOL/L (ref 20–33)
CREAT SERPL-MCNC: 0.79 MG/DL (ref 0.5–1.4)
EOSINOPHIL # BLD AUTO: 0.2 K/UL (ref 0–0.51)
EOSINOPHIL NFR BLD: 1.7 % (ref 0–6.9)
ERYTHROCYTE [DISTWIDTH] IN BLOOD BY AUTOMATED COUNT: 47.9 FL (ref 35.9–50)
GFR SERPLBLD CREATININE-BSD FMLA CKD-EPI: 96 ML/MIN/1.73 M 2
GLOBULIN SER CALC-MCNC: 2.6 G/DL (ref 1.9–3.5)
GLUCOSE SERPL-MCNC: 113 MG/DL (ref 65–99)
HCT VFR BLD AUTO: 36 % (ref 42–52)
HGB BLD-MCNC: 12.6 G/DL (ref 14–18)
IMM GRANULOCYTES # BLD AUTO: 0.18 K/UL (ref 0–0.11)
IMM GRANULOCYTES NFR BLD AUTO: 1.5 % (ref 0–0.9)
LYMPHOCYTES # BLD AUTO: 3.03 K/UL (ref 1–4.8)
LYMPHOCYTES NFR BLD: 25.1 % (ref 22–41)
MAGNESIUM SERPL-MCNC: 2 MG/DL (ref 1.5–2.5)
MCH RBC QN AUTO: 33.5 PG (ref 27–33)
MCHC RBC AUTO-ENTMCNC: 35 G/DL (ref 32.3–36.5)
MCV RBC AUTO: 95.7 FL (ref 81.4–97.8)
MONOCYTES # BLD AUTO: 1.2 K/UL (ref 0–0.85)
MONOCYTES NFR BLD AUTO: 9.9 % (ref 0–13.4)
NEUTROPHILS # BLD AUTO: 7.4 K/UL (ref 1.82–7.42)
NEUTROPHILS NFR BLD: 61.3 % (ref 44–72)
NRBC # BLD AUTO: 0 K/UL
NRBC BLD-RTO: 0 /100 WBC (ref 0–0.2)
NT-PROBNP SERPL IA-MCNC: 1602 PG/ML (ref 0–125)
PHOSPHATE SERPL-MCNC: 3.4 MG/DL (ref 2.5–4.5)
PLATELET # BLD AUTO: 217 K/UL (ref 164–446)
PMV BLD AUTO: 10.3 FL (ref 9–12.9)
POTASSIUM SERPL-SCNC: 3.9 MMOL/L (ref 3.6–5.5)
PROT SERPL-MCNC: 5.9 G/DL (ref 6–8.2)
RBC # BLD AUTO: 3.76 M/UL (ref 4.7–6.1)
SODIUM SERPL-SCNC: 136 MMOL/L (ref 135–145)
WBC # BLD AUTO: 12.1 K/UL (ref 4.8–10.8)

## 2023-05-27 PROCEDURE — 770020 HCHG ROOM/CARE - TELE (206)

## 2023-05-27 PROCEDURE — 36415 COLL VENOUS BLD VENIPUNCTURE: CPT

## 2023-05-27 PROCEDURE — 99233 SBSQ HOSP IP/OBS HIGH 50: CPT | Performed by: NURSE PRACTITIONER

## 2023-05-27 PROCEDURE — 84100 ASSAY OF PHOSPHORUS: CPT

## 2023-05-27 PROCEDURE — 83880 ASSAY OF NATRIURETIC PEPTIDE: CPT

## 2023-05-27 PROCEDURE — 99233 SBSQ HOSP IP/OBS HIGH 50: CPT | Performed by: HOSPITALIST

## 2023-05-27 PROCEDURE — A9270 NON-COVERED ITEM OR SERVICE: HCPCS | Performed by: NURSE PRACTITIONER

## 2023-05-27 PROCEDURE — 83735 ASSAY OF MAGNESIUM: CPT

## 2023-05-27 PROCEDURE — 700102 HCHG RX REV CODE 250 W/ 637 OVERRIDE(OP): Performed by: HOSPITALIST

## 2023-05-27 PROCEDURE — 700102 HCHG RX REV CODE 250 W/ 637 OVERRIDE(OP): Performed by: NURSE PRACTITIONER

## 2023-05-27 PROCEDURE — 85025 COMPLETE CBC W/AUTO DIFF WBC: CPT

## 2023-05-27 PROCEDURE — 80053 COMPREHEN METABOLIC PANEL: CPT

## 2023-05-27 PROCEDURE — A9270 NON-COVERED ITEM OR SERVICE: HCPCS | Performed by: HOSPITALIST

## 2023-05-27 RX ORDER — FUROSEMIDE 20 MG/1
20 TABLET ORAL
Status: DISCONTINUED | OUTPATIENT
Start: 2023-05-27 | End: 2023-05-29 | Stop reason: HOSPADM

## 2023-05-27 RX ADMIN — ALPRAZOLAM 0.25 MG: 0.25 TABLET ORAL at 06:28

## 2023-05-27 RX ADMIN — SENNOSIDES AND DOCUSATE SODIUM 2 TABLET: 50; 8.6 TABLET ORAL at 04:42

## 2023-05-27 RX ADMIN — SENNOSIDES AND DOCUSATE SODIUM 2 TABLET: 50; 8.6 TABLET ORAL at 17:49

## 2023-05-27 RX ADMIN — METHADONE HYDROCHLORIDE 20 MG: 10 TABLET ORAL at 17:48

## 2023-05-27 RX ADMIN — ALPRAZOLAM 0.25 MG: 0.25 TABLET ORAL at 15:30

## 2023-05-27 RX ADMIN — METOPROLOL TARTRATE 12.5 MG: 25 TABLET, FILM COATED ORAL at 17:47

## 2023-05-27 RX ADMIN — ALPRAZOLAM 0.25 MG: 0.25 TABLET ORAL at 22:16

## 2023-05-27 RX ADMIN — LOSARTAN POTASSIUM 25 MG: 25 TABLET, FILM COATED ORAL at 17:48

## 2023-05-27 RX ADMIN — APIXABAN 5 MG: 5 TABLET, FILM COATED ORAL at 17:47

## 2023-05-27 RX ADMIN — AMIODARONE HYDROCHLORIDE 400 MG: 200 TABLET ORAL at 12:08

## 2023-05-27 RX ADMIN — ASPIRIN 81 MG 81 MG: 81 TABLET ORAL at 04:43

## 2023-05-27 RX ADMIN — POLYETHYLENE GLYCOL 3350 1 PACKET: 17 POWDER, FOR SOLUTION ORAL at 04:37

## 2023-05-27 RX ADMIN — METOPROLOL TARTRATE 12.5 MG: 25 TABLET, FILM COATED ORAL at 04:43

## 2023-05-27 RX ADMIN — FUROSEMIDE 20 MG: 20 TABLET ORAL at 09:03

## 2023-05-27 RX ADMIN — PRASUGREL 10 MG: 10 TABLET, FILM COATED ORAL at 04:43

## 2023-05-27 RX ADMIN — METHADONE HYDROCHLORIDE 20 MG: 10 TABLET ORAL at 04:43

## 2023-05-27 RX ADMIN — APIXABAN 5 MG: 5 TABLET, FILM COATED ORAL at 04:43

## 2023-05-27 RX ADMIN — AMIODARONE HYDROCHLORIDE 400 MG: 200 TABLET ORAL at 17:48

## 2023-05-27 RX ADMIN — ATORVASTATIN CALCIUM 80 MG: 80 TABLET, FILM COATED ORAL at 17:48

## 2023-05-27 RX ADMIN — OXYCODONE HYDROCHLORIDE 10 MG: 10 TABLET ORAL at 12:08

## 2023-05-27 RX ADMIN — AMIODARONE HYDROCHLORIDE 400 MG: 200 TABLET ORAL at 04:42

## 2023-05-27 ASSESSMENT — ENCOUNTER SYMPTOMS
GASTROINTESTINAL NEGATIVE: 1
CHEST TIGHTNESS: 0
EYES NEGATIVE: 1
NERVOUS/ANXIOUS: 1
COUGH: 1
DIZZINESS: 0
MYALGIAS: 1
PALPITATIONS: 0
BACK PAIN: 1
PALPITATIONS: 1
WEAKNESS: 1
SHORTNESS OF BREATH: 0

## 2023-05-27 ASSESSMENT — COGNITIVE AND FUNCTIONAL STATUS - GENERAL
DAILY ACTIVITIY SCORE: 22
MOBILITY SCORE: 21
STANDING UP FROM CHAIR USING ARMS: A LITTLE
HELP NEEDED FOR BATHING: A LITTLE
WALKING IN HOSPITAL ROOM: A LITTLE
SUGGESTED CMS G CODE MODIFIER MOBILITY: CJ
DRESSING REGULAR LOWER BODY CLOTHING: A LITTLE
SUGGESTED CMS G CODE MODIFIER DAILY ACTIVITY: CJ
CLIMB 3 TO 5 STEPS WITH RAILING: A LITTLE

## 2023-05-27 ASSESSMENT — PAIN DESCRIPTION - PAIN TYPE
TYPE: ACUTE PAIN
TYPE: CHRONIC PAIN

## 2023-05-27 ASSESSMENT — NEW YORK HEART ASSOCIATION (NYHA) CLASSIFICATION: NYHA FUNCTIONAL CLASS III: 1

## 2023-05-27 ASSESSMENT — FIBROSIS 4 INDEX: FIB4 SCORE: 2.14

## 2023-05-27 NOTE — CARE PLAN
The patient is Stable - Low risk of patient condition declining or worsening    Shift Goals  Clinical Goals: Monitor vitals, pain managment  Patient Goals: Sleep  Family Goals: Rest    Progress made toward(s) clinical / shift goals:

## 2023-05-27 NOTE — PROGRESS NOTES
Patient arrived to unit. Patient alert and oriented. Patient in very good spirits. Assessment complete. No outstanding issues at this time.     Called from [facility] in Tele and alerted patient had ST elevation. I checked previous EKGs and noted persistent elevation since admission and cath. Called [facility] back and informed him the elevation was the baseline for patient.

## 2023-05-27 NOTE — HEART FAILURE PROGRAM
Acute Care Clinician Note template for HFrEF (heart failure with reduced ejection fraction 40% or less)    Class, Stage, and Left Ventricular Function  Stage: C  Class: III  Etiology: Ischemic  Ischemic Testing: [facility]  Precipitant to heart failure admission: V-Fib arrest  EF:   Lab Results   Component Value Date/Time    LVEF 25 05/24/2023 1315      Dry Weight: ?    Trajectory Check & Diuresis Goal for next 24 hours  Intake/Output Summary:   Intake/Output Summary (Last 24 hours) at 5/27/2023 1216  Last data filed at 5/27/2023 0200  Gross per 24 hour   Intake 300 ml   Output 1000 ml   Net -700 ml     Last 3 Weights & Sources         5/24/2023  2000 5/26/2023  0000 5/26/2023  1853       Weight: 122 kg (268 lb 15.4 oz) 119 kg (261 lb 14.5 oz) 119 kg (262 lb 12.6 oz)     Weight Source: Bed Scale Stand Up Scale Stand Up Scale           Status (Improving, stalled, or worsening): Improving  Diuresis Goal:   B Natriuretic Peptide:   Lab Results   Component Value Date/Time    NTPROBNP 1602 (H) 05/27/2023 1004      Hemoglobin A1C:   Lab Results   Component Value Date/Time    HBA1C 5.5 05/23/2023 2030       GDMT (guideline directed medical therapy) Prescribed at Discharge/Or Reason Not Prescribed  Evidence Based Beta Blocker for EF of 40% or less:  Metoprolol Tartrate  JUSTINE - I, for EF of 40% or less ARNI is preferred : losartan  Aldosterone antagonist, for EF of 40% or less : Will start tomorrow  SGLT2 inhibitor with proven ASCVD, HF, or DKD benefit Jardiance/empagliflozin): Initiate as Inpatient - dapagliflozin (Farxiga)  Anticoagulation for atrial arrhythmia if applicable: apixaban (Eliquis)  Glycemic control for DM + HF If applicable: Further optimization is deferred to hospitalist service  Lipid lowering medication for DM + HF if applicable: atorvastatin  For patients self-identified as  with NYHA class III-IV HFrEF who are receiving optimal medical therapy, the combination of hydralazine and isosorbide  dinitrate is recommended to improve symptoms and reduce morbidity and mortality: Not applicable    Consider placing a social work consult to assess patient’s ability to afford copays or out of pocket costs of medications: Not applicable     GDMT (guideline directed medical therapy) Plan for Optimization  Plan to optimize GDMT: To be addressed in the outpatient setting    Prevention/Interventions  Upcoming Cardiology & Med Group Appts       No appointments to display          Follow up appointment within 7 calendar days of discharge: Scheduling order placed   Smoking cessation counseling documented: Deferred to hospitialist  Referral to Cardiac Rehab (stable, chronic heart failure with left ejection fraction of 35% or less and New York Heart Association (NYHA) Class II to IV symptoms on optimal heart failure therapy for at least six weeks): Referral placed  Assessed for advanced circulatory support / transplant need; discussed with patient: To be discussed at future date  Goals of Care:  Advanced Directive: Plan for Outpatient Completion  POLST Form: Plan for Outpatient Completion  Goals of Care Conversation: Deferred    Consideration of cardiac resynchronization therapy (BiV Pacer) and/or implantable cardiac defibrillator (ICD)    Does the patient have a defibrilator (ICD)?: NO - Criteria for Cardiac Resynchronization Therapy (must meet all 3):  EF <35% No    NYHA Class III or ambulatory Class IV Yes   QRS Duration >120 ms No      Patient meets criteria and will be referred to EP. To be discharged home with a LifeVest

## 2023-05-27 NOTE — PROGRESS NOTES
Hospital Medicine Daily Progress Note    Date of Service  5/27/2023    Chief Complaint  [place]am is a 68 y.o. male admitted 5/23/2023 with an acute inferior ST elevation myocardial infarction    Hospital Course  68 y.o. male who presented 5/23/2023 with a past med history of hypertension, dyslipidemia, chronic pain, restless leg syndrome, tobacco abuse, presents on 5/23/2023 from [place] at the [facility] in [place] with an ST elevation myocardial infarction, complicated by a cardiac arrest in route to the hospital, the patient had a total of 16 shocks, CPR, received lidocaine as well as amiodarone, the patient obtained ROSC and was neurologically intact, subsequently was intubated and brought to the Cath Lab for intervention, he underwent PCI and drug-eluting stenting x1 to his RCA with SHADIA II flow, the patient was found to have significant akinesis of the entire inferior wall and elevated LV filling pressures, was brought to the ICU sedated, on propofol, moving all 4 extremities, requiring pressor therapy for blood pressure support.  On 5/24 the patient was extubated in the morning, he had additional episode of 32nd VT, the patient later in the afternoon is reported to be further stabilized and to be transferred out of ICU to the [facility] level of care for the time being.  Patient continues to have chest pain from CPR, he denies current dizziness, no nausea vomiting, no fevers chills.  Patient was started on diuretics currently.  The patient was identified to have a ischemic cardiomyopathy now with ejection fraction of 25% with global hypokinesis.  Currently patient is afebrile, his heart rate is in the 50s, respiration is unlabored, the patient is still on nasal cannula oxygen, currently saturating in the 90s, blood pressure is soft coming off norepinephrine, currently around 100/60,  His laboratory data indicates a white cell count of 22.1, hemoglobin 13.7, platelet count 379, chemistry with  a sodium of 139, potassium 4.2, chloride 101, bicarb 22, glucose 153, BUN 26, creatinine 1.37, , , a total protein of 5.6, albumin 3.5, CPK 7065, cholesterol panel with a LDL of 83, HDL 50, urine drug screen positive for methadone, troponin level initially at 28,    Interval Problem Update  Patient seen and examined today.  Data, Medication data reviewed.  Case discussed with nursing as available.  Plan of Care reviewed with patient and notified of changes.   5/25 the patient feels much better, he still has post CPR chest wall pain, he denies dyspnea, he is currently coming off oxygen, he had a fever last night, chest x-ray without infiltrate, Pro-Preet is low, mild cough, no other infectious symptoms currently verbalized, family at the bedside, given detailed updates on the patient's current condition, data and projected hospital course.  Cardiology has implemented GDMT, currently the patient's blood pressure is not sufficient to support aggressive medication therapy.  White count 16.5, hemoglobin 12.3, platelet count 217, FTs are improving, , , echocardiogram reported with a EF of 25%, global hypokinesis with regional variation, grade 1 diastolic dysfunction,  Chest x-ray without cardiopulmonary abnormality  5/26 the patient is anxious, uncomfortable, he is unable to sleep in the bed, he has some chronic back pain, he denies currently withdrawing from tobacco, no further chest pain, continues currently in rate controlled atrial fibrillation, afebrile, heart rate in the 70s to 100s, the patient is currently on room air, 97%, blood pressure is still on the low side in the 95-1 teens range over 60s, laboratory data with improved white cell count of 13, hemoglobin 12.0, platelet count 209, chemistry with potassium 4.0, glucose 105, calcium 8.8, improving LFTs down to 185 and 218 respectively, CPK improved to 951, magnesium 2.1,  5/27 the patient's anxiety level has improved, he feels overall  better, no chest pain currently, currently afebrile, heart rate in the 60s and 80s, respiration unlabored, currently on room air, 90 to 94% saturation, blood pressure remains soft, but primarily in the 100s over 60s, laboratory data indicates white count 12.1, hemoglobin 12.6, hematocrit 36, platelet count is stable, chemistry improving, improving AST, ALT, currently, cardiology is planning for a LifeVest secondary to the patient's poor ejection fraction failsafe.  Ongoing titration with GDMT  Extended discussion with family at the bedside, updates provided, needs addressed.      I have discussed this patient's plan of care and discharge plan at IDT rounds today with Case Management, Nursing, Nursing leadership, and other members of the IDT team.    Consultants/Specialty  cardiology and critical care    Code Status  Full Code    Disposition  The patient is not medically cleared for discharge to home or a post-acute facility.  Anticipate discharge to: home with close outpatient follow-up    I have placed the appropriate orders for post-discharge needs.    Review of Systems  Review of Systems   Constitutional:  Positive for malaise/fatigue.   HENT: Negative.     Eyes: Negative.    Respiratory:  Positive for cough.    Cardiovascular:  Positive for chest pain and palpitations. Negative for leg swelling.   Gastrointestinal: Negative.    Genitourinary: Negative.    Musculoskeletal:  Positive for back pain and myalgias.   Skin: Negative.    Neurological:  Positive for weakness.   Endo/Heme/Allergies: Negative.    Psychiatric/Behavioral:  The patient is nervous/anxious.    All other systems reviewed and are negative.       Physical Exam  Temp:  [36.4 °C (97.5 °F)-37.1 °C (98.8 °F)] 36.4 °C (97.5 °F)  Pulse:  [64-86] 69  Resp:  [16-18] 18  BP: ()/(60-83) 100/66  SpO2:  [91 %-99 %] 94 %    Physical Exam  Vitals and nursing note reviewed.   Constitutional:       Appearance: He is well-developed. He is ill-appearing.       Comments: Pt seen and examined.   HENT:      Head: Normocephalic and atraumatic.   Eyes:      Pupils: Pupils are equal, round, and reactive to light.   Cardiovascular:      Rate and Rhythm: Normal rate. Rhythm irregular.      Heart sounds: Normal heart sounds.   Pulmonary:      Effort: Pulmonary effort is normal.      Breath sounds: Rhonchi present.   Abdominal:      General: Bowel sounds are normal.      Palpations: Abdomen is soft.   Musculoskeletal:         General: Normal range of motion.      Cervical back: Normal range of motion and neck supple.   Skin:     General: Skin is warm and dry.   Neurological:      General: No focal deficit present.      Mental Status: He is alert and oriented to person, place, and time.   Psychiatric:         Behavior: Behavior normal.         Fluids    Intake/Output Summary (Last 24 hours) at 5/27/2023 1451  Last data filed at 5/27/2023 0200  Gross per 24 hour   Intake 300 ml   Output 1000 ml   Net -700 ml         Laboratory  Recent Labs     05/25/23  0330 05/26/23  0436 05/27/23  0733   WBC 16.5* 13.0* 12.1*   RBC 3.89* 3.72* 3.76*   HEMOGLOBIN 12.3* 12.0* 12.6*   HEMATOCRIT 37.6* 35.9* 36.0*   MCV 96.7 96.5 95.7   MCH 31.6 32.3 33.5*   MCHC 32.7 33.4 35.0   RDW 52.4* 49.6 47.9   PLATELETCT 217 209 217   MPV 10.0 10.2 10.3       Recent Labs     05/25/23  0330 05/26/23  0436 05/27/23  1004   SODIUM 136 136 136   POTASSIUM 4.1 4.0 3.9   CHLORIDE 102 100 98   CO2 24 28 25   GLUCOSE 112* 105* 113*   BUN 21 21 20   CREATININE 0.64 0.63 0.79   CALCIUM 8.3* 8.8 8.7                         Imaging  DX-CHEST-PORTABLE (1 VIEW)   Final Result      No acute cardiopulmonary abnormality.         EC-ECHOCARDIOGRAM COMPLETE W/ CONT   Final Result      DX-CHEST-PORTABLE (1 VIEW)   Final Result      1.  Low lung volumes without definite acute cardiopulmonary abnormality.      DX-ABDOMEN FOR TUBE PLACEMENT   Final Result      Nasogastric tube tip terminates within the stomach.      DX-CHEST-PORTABLE  (1 VIEW)   Final Result      1.  No acute cardiac or pulmonary abnormalities are identified.   2.  Endotracheal tube tip at the level of the clavicular heads      CL-LEFT HEART CATHETERIZATION WITH POSSIBLE INTERVENTION    (Results Pending)          Assessment/Plan  * STEMI (ST elevation myocardial infarction) (HCC)- (present on admission)  Assessment & Plan  Underwent PCI with SABINO x1 to his RCA with SHADIA II flow.  He was noted to have akinesis of the entire inferior wall and elevated LV filling pressures consistent with decompensated heart failure.   Echocardiogram confirms EF of 25%, global hypokinesis  - monitor and replete lytes appropriately  - prasugrel, aspirin  - statin, high intensity  Initiate beta-blockade once tolerating  Patient has been titrated off Levophed  Forced diuresis as tolerated  Postcardiac follow-up, follow-up echocardiogram and a certain interval  GDMT as tolerated    Ischemic cardiomyopathy  Assessment & Plan  Acute, ejection fraction 25%  GDMT  Titrating cardiac medication  LifeVest as failsafe    Atrial fibrillation (HCC)  Assessment & Plan  Likely new onset, secondary to patient's ischemic event  Ongoing antiarrhythmic therapy with amiodarone  Adjust anticoagulation accordingly, given the patient's poor ejection fraction, new onset  Close telemetry monitoring  Patient currently converted to sinus rhythm on 5/27    Cardiac arrest (HCC)- (present on admission)  Assessment & Plan  VT arrest due to RCA occlusion - underwent multiple rounds of defibrillation, CPR, ACLS   now s/p PCI to RCA  Monitor closely, optimize medical therapy  Patient with significant reduction of ejection fraction,  Cardiology following  Stabilized to transfer out of CICU    Acute respiratory failure with hypoxia (HCC)- (present on admission)  Assessment & Plan  Intubated periarrest  Extubated on 5/24 following cath and now on NC - wean as tolerated  Pulmonary toilet, monitor  Forced diuresis, the patient with some  degree of orthopnea    TESS (acute kidney injury) (HCC)- (present on admission)  Assessment & Plan  Likely multifactorial from cardiac arrest/hypoperfusion and potentially cardiorenal now given e/o heart failure  Monitor  closely, avoid nephrotoxins      Hypotension due to hypovolemia  Assessment & Plan  Patient with poor systolic function,  Relative bradycardia secondary to acute MI  Gentle fluid resuscitation  Patient currently does not have much room for goal-directed medical therapy titration    Tobacco abuse disorder- (present on admission)  Assessment & Plan  Strongly suggested to engage in lifelong smoking cessation    Restless leg syndrome- (present on admission)  Assessment & Plan  Existing, symptomatic treatment    Transaminitis- (present on admission)  Assessment & Plan  Following cardiac arrest - likely from ischemia and component of congestion from decompensated heart failure  Monitor closely, avoid offending agents      Leukocytosis- (present on admission)  Assessment & Plan  No fevers or clinical e/o infection  Likely reactive, following cardiac arrest  Monitor closely,  Low threshold to treat for possible pulmonary infection given the patient's prolonged cardiac resuscitation    Hypokalemia- (present on admission)  Assessment & Plan  Replete to maintain >4  Monitor and replete mag    Hypercholesteremia- (present on admission)  Assessment & Plan  Maximized statin therapy in form of atorvastatin 80 mg daily      HTN (hypertension)- (present on admission)  Assessment & Plan  Holding home antihypertensives secondary to hypotension post intervention  Monitor closely and reinstitute as indicated and tolerated    Chronic pain- (present on admission)  Assessment & Plan  Restart home methadone at home dosing  PRN oxy    VT (ventricular tachycardia) (HCC)- (present on admission)  Assessment & Plan  S/p cardioversion on 5/23 multiple times - had one episode of NSVT for 30 seconds today w/o hemodynamic  compromise.  - optimized lytes  - monitor  S/p administration of amiodarone, lidocaine  Monitor closely,  LifeVest on discharge given the patient's poor ejection fraction, high risk for additional cardiac dysrhythmia, sudden cardiac death    Plan  Titrate GDMT as tolerated  Agree with LifeVest arrangements, patient lives remotely, significant risk of additional cardiac dysrhythmia  Continue close monitoring,  Agree with diuresis to volume reduce, monitor closely  Continue amiodarone as per cardiology  Initiate full anticoagulation, close monitoring  Titrate medication very slowly as the patient still has marginal blood pressures  Anxiolytics to continue, currently improved status  Close telemetry monitoring  Optimize electrolytes  See orders  Patient is has a high medical complexity, complex decision making and is at high risk for complication, morbidity, and mortality.  My total time spent caring for the patient on the day of the encounter was 52 minutes.   This does not include time spent on separately billable procedures/tests.    VTE prophylaxis: enoxaparin ppx    I have performed a physical exam and reviewed and updated ROS and Plan today (5/27/2023). In review of yesterday's note (5/26/2023), there are no changes except as documented above.      Please note that this dictation was created using voice recognition software. I have made every reasonable attempt to correct obvious errors, but I expect that there are errors of grammar and possibly context that I did not discover before finalizing the note.

## 2023-05-27 NOTE — CARE PLAN
Problem: Knowledge Deficit - Standard  Goal: Patient and family/care givers will demonstrate understanding of plan of care, disease process/condition, diagnostic tests and medications  Outcome: Progressing  Note: Patient and family are excited to learn about education regarding patients condition.      Problem: Pain - Standard  Goal: Alleviation of pain or a reduction in pain to the patient’s comfort goal  Outcome: Progressing     Problem: Skin Integrity  Goal: Skin integrity is maintained or improved  Outcome: Progressing     Problem: Fall Risk  Goal: Patient will remain free from falls  Outcome: Progressing   The patient is Stable - Low risk of patient condition declining or worsening    Shift Goals  Clinical Goals: monitor vitals, sleep, pain management  Patient Goals: sleep, decrease anxiety  Family Goals: rest go home soon    Progress made toward(s) clinical / shift goals:  will continue to monitor patients vitals and lab    Patient is not progressing towards the following goals:

## 2023-05-27 NOTE — CARE PLAN
The patient is Watcher - Medium risk of patient condition declining or worsening    Shift Goals  Clinical Goals: hemodynamically stable  Patient Goals: sleep, decreased anxiety  Family Goals: no family present at this time    Progress made toward(s) clinical / shift goals:  Patient transferred to lower level of care. Currently hemodynamically stable

## 2023-05-27 NOTE — PROGRESS NOTES
Assumed care of patient, bedside report received from Douglas bhardwaj shift RN. Updated on plan of care, call light within reach and fall precautions are in place and bed lock and in lowest position. Patient instructed to call for assistance before getting out of bed. All questions answered at this time. No other needs.

## 2023-05-28 ENCOUNTER — PHARMACY VISIT (OUTPATIENT)
Dept: PHARMACY | Facility: MEDICAL CENTER | Age: 68
End: 2023-05-28
Payer: COMMERCIAL

## 2023-05-28 LAB
ALBUMIN SERPL BCP-MCNC: 3.6 G/DL (ref 3.2–4.9)
ALBUMIN/GLOB SERPL: 1.3 G/DL
ALP SERPL-CCNC: 69 U/L (ref 30–99)
ALT SERPL-CCNC: 141 U/L (ref 2–50)
ANION GAP SERPL CALC-SCNC: 12 MMOL/L (ref 7–16)
AST SERPL-CCNC: 63 U/L (ref 12–45)
BILIRUB SERPL-MCNC: 0.5 MG/DL (ref 0.1–1.5)
BUN SERPL-MCNC: 21 MG/DL (ref 8–22)
CALCIUM ALBUM COR SERPL-MCNC: 9.1 MG/DL (ref 8.5–10.5)
CALCIUM SERPL-MCNC: 8.8 MG/DL (ref 8.5–10.5)
CHLORIDE SERPL-SCNC: 99 MMOL/L (ref 96–112)
CO2 SERPL-SCNC: 26 MMOL/L (ref 20–33)
CREAT SERPL-MCNC: 0.84 MG/DL (ref 0.5–1.4)
ERYTHROCYTE [DISTWIDTH] IN BLOOD BY AUTOMATED COUNT: 48.9 FL (ref 35.9–50)
GFR SERPLBLD CREATININE-BSD FMLA CKD-EPI: 94 ML/MIN/1.73 M 2
GLOBULIN SER CALC-MCNC: 2.7 G/DL (ref 1.9–3.5)
GLUCOSE SERPL-MCNC: 94 MG/DL (ref 65–99)
HCT VFR BLD AUTO: 37.7 % (ref 42–52)
HGB BLD-MCNC: 12.6 G/DL (ref 14–18)
MAGNESIUM SERPL-MCNC: 1.9 MG/DL (ref 1.5–2.5)
MCH RBC QN AUTO: 32.1 PG (ref 27–33)
MCHC RBC AUTO-ENTMCNC: 33.4 G/DL (ref 32.3–36.5)
MCV RBC AUTO: 96.2 FL (ref 81.4–97.8)
NT-PROBNP SERPL IA-MCNC: 2028 PG/ML (ref 0–125)
PHOSPHATE SERPL-MCNC: 3.6 MG/DL (ref 2.5–4.5)
PLATELET # BLD AUTO: 252 K/UL (ref 164–446)
PMV BLD AUTO: 10.5 FL (ref 9–12.9)
POTASSIUM SERPL-SCNC: 3.8 MMOL/L (ref 3.6–5.5)
PROT SERPL-MCNC: 6.3 G/DL (ref 6–8.2)
RBC # BLD AUTO: 3.92 M/UL (ref 4.7–6.1)
SODIUM SERPL-SCNC: 137 MMOL/L (ref 135–145)
WBC # BLD AUTO: 13.7 K/UL (ref 4.8–10.8)

## 2023-05-28 PROCEDURE — A9270 NON-COVERED ITEM OR SERVICE: HCPCS | Performed by: NURSE PRACTITIONER

## 2023-05-28 PROCEDURE — 700102 HCHG RX REV CODE 250 W/ 637 OVERRIDE(OP): Performed by: HOSPITALIST

## 2023-05-28 PROCEDURE — 99233 SBSQ HOSP IP/OBS HIGH 50: CPT | Performed by: HOSPITALIST

## 2023-05-28 PROCEDURE — RXMED WILLOW AMBULATORY MEDICATION CHARGE: Performed by: HOSPITALIST

## 2023-05-28 PROCEDURE — 83735 ASSAY OF MAGNESIUM: CPT

## 2023-05-28 PROCEDURE — 84100 ASSAY OF PHOSPHORUS: CPT

## 2023-05-28 PROCEDURE — 36415 COLL VENOUS BLD VENIPUNCTURE: CPT

## 2023-05-28 PROCEDURE — 83880 ASSAY OF NATRIURETIC PEPTIDE: CPT

## 2023-05-28 PROCEDURE — 85027 COMPLETE CBC AUTOMATED: CPT

## 2023-05-28 PROCEDURE — 700102 HCHG RX REV CODE 250 W/ 637 OVERRIDE(OP): Performed by: NURSE PRACTITIONER

## 2023-05-28 PROCEDURE — 99232 SBSQ HOSP IP/OBS MODERATE 35: CPT | Mod: 25 | Performed by: INTERNAL MEDICINE

## 2023-05-28 PROCEDURE — 770020 HCHG ROOM/CARE - TELE (206)

## 2023-05-28 PROCEDURE — 80053 COMPREHEN METABOLIC PANEL: CPT

## 2023-05-28 PROCEDURE — 99406 BEHAV CHNG SMOKING 3-10 MIN: CPT | Performed by: INTERNAL MEDICINE

## 2023-05-28 PROCEDURE — A9270 NON-COVERED ITEM OR SERVICE: HCPCS | Performed by: HOSPITALIST

## 2023-05-28 PROCEDURE — RXMED WILLOW AMBULATORY MEDICATION CHARGE: Performed by: NURSE PRACTITIONER

## 2023-05-28 RX ORDER — SPIRONOLACTONE 25 MG/1
25 TABLET ORAL
Status: DISCONTINUED | OUTPATIENT
Start: 2023-05-28 | End: 2023-05-29 | Stop reason: HOSPADM

## 2023-05-28 RX ORDER — LOSARTAN POTASSIUM 25 MG/1
25 TABLET ORAL EVERY EVENING
Qty: 30 TABLET | Refills: 2 | Status: SHIPPED | OUTPATIENT
Start: 2023-05-28 | End: 2023-08-26

## 2023-05-28 RX ORDER — AMIODARONE HYDROCHLORIDE 200 MG/1
200 TABLET ORAL DAILY
Qty: 30 TABLET | Refills: 1 | Status: SHIPPED | OUTPATIENT
Start: 2023-06-12 | End: 2023-08-11

## 2023-05-28 RX ORDER — SPIRONOLACTONE 25 MG/1
25 TABLET ORAL DAILY
Qty: 30 TABLET | Refills: 3 | Status: SHIPPED | OUTPATIENT
Start: 2023-05-28

## 2023-05-28 RX ORDER — METOPROLOL SUCCINATE 25 MG/1
25 TABLET, EXTENDED RELEASE ORAL EVERY EVENING
Qty: 30 TABLET | Refills: 2 | Status: SHIPPED | OUTPATIENT
Start: 2023-05-28

## 2023-05-28 RX ORDER — FUROSEMIDE 20 MG/1
20 TABLET ORAL
Qty: 30 TABLET | Refills: 1 | Status: SHIPPED | OUTPATIENT
Start: 2023-05-28 | End: 2023-05-29 | Stop reason: SDUPTHER

## 2023-05-28 RX ORDER — ATORVASTATIN CALCIUM 80 MG/1
80 TABLET, FILM COATED ORAL EVERY EVENING
Qty: 30 TABLET | Refills: 3 | Status: SHIPPED | OUTPATIENT
Start: 2023-05-28 | End: 2023-09-25

## 2023-05-28 RX ORDER — AMIODARONE HYDROCHLORIDE 200 MG/1
400 TABLET ORAL TWICE DAILY
Status: DISCONTINUED | OUTPATIENT
Start: 2023-05-28 | End: 2023-05-29 | Stop reason: HOSPADM

## 2023-05-28 RX ORDER — DAPAGLIFLOZIN 10 MG/1
10 TABLET, FILM COATED ORAL DAILY
Qty: 30 TABLET | Refills: 2 | Status: SHIPPED | OUTPATIENT
Start: 2023-05-29

## 2023-05-28 RX ORDER — METOPROLOL SUCCINATE 25 MG/1
25 TABLET, EXTENDED RELEASE ORAL EVERY EVENING
Status: DISCONTINUED | OUTPATIENT
Start: 2023-05-28 | End: 2023-05-29 | Stop reason: HOSPADM

## 2023-05-28 RX ORDER — METHADONE HYDROCHLORIDE 10 MG/1
20 TABLET ORAL 2 TIMES DAILY
Qty: 40 TABLET | Refills: 0 | Status: SHIPPED | OUTPATIENT
Start: 2023-05-28 | End: 2023-06-07

## 2023-05-28 RX ORDER — ASPIRIN 81 MG/1
81 TABLET, CHEWABLE ORAL DAILY
Qty: 11 TABLET | Refills: 0 | Status: SHIPPED | OUTPATIENT
Start: 2023-05-29 | End: 2023-06-09

## 2023-05-28 RX ORDER — ALPRAZOLAM 0.25 MG/1
0.25 TABLET ORAL 4 TIMES DAILY PRN
Qty: 30 TABLET | Refills: 0 | Status: SHIPPED | OUTPATIENT
Start: 2023-05-28 | End: 2023-06-07

## 2023-05-28 RX ORDER — AMIODARONE HYDROCHLORIDE 400 MG/1
400 TABLET ORAL 2 TIMES DAILY
Qty: 28 TABLET | Refills: 0 | Status: SHIPPED | OUTPATIENT
Start: 2023-05-28 | End: 2023-06-11

## 2023-05-28 RX ORDER — PRASUGREL 10 MG/1
10 TABLET, FILM COATED ORAL DAILY
Qty: 30 TABLET | Refills: 3 | Status: SHIPPED | OUTPATIENT
Start: 2023-05-29

## 2023-05-28 RX ORDER — AMIODARONE HYDROCHLORIDE 200 MG/1
200 TABLET ORAL
Status: DISCONTINUED | OUTPATIENT
Start: 2023-06-12 | End: 2023-05-29 | Stop reason: HOSPADM

## 2023-05-28 RX ADMIN — METOPROLOL SUCCINATE 25 MG: 25 TABLET, EXTENDED RELEASE ORAL at 17:15

## 2023-05-28 RX ADMIN — ALPRAZOLAM 0.25 MG: 0.25 TABLET ORAL at 22:12

## 2023-05-28 RX ADMIN — ASPIRIN 81 MG 81 MG: 81 TABLET ORAL at 04:11

## 2023-05-28 RX ADMIN — SPIRONOLACTONE 25 MG: 25 TABLET ORAL at 08:50

## 2023-05-28 RX ADMIN — FUROSEMIDE 20 MG: 20 TABLET ORAL at 04:11

## 2023-05-28 RX ADMIN — APIXABAN 5 MG: 5 TABLET, FILM COATED ORAL at 04:11

## 2023-05-28 RX ADMIN — PRASUGREL 10 MG: 10 TABLET, FILM COATED ORAL at 04:10

## 2023-05-28 RX ADMIN — AMIODARONE HYDROCHLORIDE 400 MG: 200 TABLET ORAL at 17:14

## 2023-05-28 RX ADMIN — AMIODARONE HYDROCHLORIDE 400 MG: 200 TABLET ORAL at 04:11

## 2023-05-28 RX ADMIN — ALPRAZOLAM 0.25 MG: 0.25 TABLET ORAL at 10:12

## 2023-05-28 RX ADMIN — DAPAGLIFLOZIN 10 MG: 10 TABLET, FILM COATED ORAL at 04:11

## 2023-05-28 RX ADMIN — SENNOSIDES AND DOCUSATE SODIUM 2 TABLET: 50; 8.6 TABLET ORAL at 17:15

## 2023-05-28 RX ADMIN — APIXABAN 5 MG: 5 TABLET, FILM COATED ORAL at 17:15

## 2023-05-28 RX ADMIN — METOPROLOL TARTRATE 12.5 MG: 25 TABLET, FILM COATED ORAL at 04:11

## 2023-05-28 RX ADMIN — METHADONE HYDROCHLORIDE 20 MG: 10 TABLET ORAL at 04:10

## 2023-05-28 RX ADMIN — METHADONE HYDROCHLORIDE 20 MG: 10 TABLET ORAL at 17:14

## 2023-05-28 RX ADMIN — ALPRAZOLAM 0.25 MG: 0.25 TABLET ORAL at 17:14

## 2023-05-28 RX ADMIN — LOSARTAN POTASSIUM 25 MG: 25 TABLET, FILM COATED ORAL at 17:14

## 2023-05-28 RX ADMIN — ATORVASTATIN CALCIUM 80 MG: 80 TABLET, FILM COATED ORAL at 17:15

## 2023-05-28 RX ADMIN — SENNOSIDES AND DOCUSATE SODIUM 2 TABLET: 50; 8.6 TABLET ORAL at 04:10

## 2023-05-28 ASSESSMENT — FIBROSIS 4 INDEX: FIB4 SCORE: 1.45

## 2023-05-28 ASSESSMENT — PAIN DESCRIPTION - PAIN TYPE: TYPE: CHRONIC PAIN;ACUTE PAIN

## 2023-05-28 ASSESSMENT — ENCOUNTER SYMPTOMS
WEAKNESS: 1
MYALGIAS: 1
BACK PAIN: 1
NERVOUS/ANXIOUS: 1
PALPITATIONS: 1
COUGH: 1
GASTROINTESTINAL NEGATIVE: 1
EYES NEGATIVE: 1

## 2023-05-28 NOTE — PROGRESS NOTES
Cardiology Progress Note:    Emil Pena M.D.  Date & Time note created:    5/28/2023   11:15 AM     Referring MD:  Dr. Cummings    Patient ID:   Name:             Des Elam     YOB: 1955  Age:                 68 y.o.  male   MRN:               [id number]                                                             Chief Complaint / Reason for consult:  STEMI, cardiac arrest.    History of Present Illness:    This is a 68 years old man without prior cardiac problems, presented to the hospital via EMS after he was found to be in cardiac arrest with ventricular fibrillation, CPR was performed, defibrillation was performed, patient was noted to have ST elevation on inferior leads with reciprocal changes on field EKG.  Patient presented to the hospital with ongoing ventricular arrhythmias.  He was defibrillated twice in the ER.  He was intubated in the ER.  He did have good mentation when he was awake after defibrillation.  I personally interpreted the EKG tracing on field which showed inferior ST elevation with reciprocal changes suggestive of inferior myocardial infarct acute events.  Per his wife's report, he does not have prior history of cardiac problems, no prior cardiac procedure or surgery.  Patient does have hypertension problem.     In the ER, patient goes in and out of ventricular arrhythmias.  He received CPR, lidocaine loading, magnesium loading and defibrillation x2 in the ER.  He was never hypotensive.    He is s/p RCA stent.    LVEF of 25 % with global hypokinesis. No significant valvular disease. I have independently interpreted and reviewed echocardiogram's actual images.     Overnight events:  Back in sinus rhythm with PACs.    Review of Systems:      Constitutional: Denies fevers, Denies weight changes  Eyes: Denies changes in vision, no eye pain  Ears/Nose/Throat/Mouth: Denies nasal congestion or sore throat   Cardiovascular: yes chest pain, no palpitations   Respiratory: no  shortness of breath , Denies cough  Gastrointestinal/Hepatic: Denies abdominal pain, nausea, vomiting, diarrhea, constipation or GI bleeding   Genitourinary: Denies dysuria or frequency  Musculoskeletal/Rheum: Denies  joint pain and swelling   Skin: Denies rash  Neurological: Denies headache, confusion, memory loss or focal weakness/parasthesias  Psychiatric: denies mood disorder   Endocrine: Rivka thyroid problems  Heme/Oncology/Lymph Nodes: Denies enlarged lymph nodes, denies brusing or known bleeding disorder  All other systems were reviewed and are negative (AMA/CMS criteria)                Past Medical History:   Past Medical History:   Diagnosis Date    Dyslipidemia     Hypertension     RLS (restless legs syndrome)     Tobacco abuse      Active Hospital Problems    Diagnosis     Ischemic cardiomyopathy [I25.5]     Atrial fibrillation (HCC) [I48.91]     Hypotension due to hypovolemia [I95.89, E86.1]     Leukocytosis [D72.829]     Transaminitis [R74.01]     TESS (acute kidney injury) ([facility]) [N17.9]     Restless leg syndrome [G25.81]     Tobacco abuse disorder [Z72.0]     STEMI (ST elevation myocardial infarction) ([facility]) [I21.3]     VT (ventricular tachycardia) ([facility]) [I47.20]     Acute respiratory failure with hypoxia ([facility]) [J96.01]     Cardiac arrest ([facility]) [I46.9]     Chronic pain [G89.29]     HTN (hypertension) [I10]     Hypercholesteremia [E78.00]     Hypokalemia [E87.6]        Past Surgical History:  Past Surgical History:   Procedure Laterality Date    KNEE ARTHROPLASTY TOTAL Bilateral        Hospital Medications:    Current Facility-Administered Medications:     spironolactone (ALDACTONE) tablet 25 mg, 25 mg, Oral, Q DAY, Dax Cabrales M.D., 25 mg at 05/28/23 0850    amiodarone (Cordarone) tablet 400 mg, 400 mg, Oral, TWICE DAILY, Pete Prasad, A.P.N.    [START ON 6/12/2023] amiodarone (Cordarone) tablet 200 mg, 200 mg, Oral, Q DAY, Pete Prasad A.P.N.    metoprolol SR (TOPROL XL) tablet 25 mg,  25 mg, Oral, Q EVENING, Pete Prasad A.P.N.    furosemide (LASIX) tablet 20 mg, 20 mg, Oral, Q DAY, JOSS Ray, 20 mg at 05/28/23 0411    ALPRAZolam (XANAX) tablet 0.25 mg, 0.25 mg, Oral, 4X/DAY PRN, Dax Cabrales M.D., 0.25 mg at 05/28/23 1012    apixaban (ELIQUIS) tablet 5 mg, 5 mg, Oral, BID, Dax Cabrales M.D., 5 mg at 05/28/23 0411    losartan (COZAAR) tablet 25 mg, 25 mg, Oral, Q EVENING, Dax Cabrales M.D., 25 mg at 05/27/23 1748    atorvastatin (LIPITOR) tablet 80 mg, 80 mg, Oral, Q EVENING, Dax Cabrales M.D., 80 mg at 05/27/23 1748    aspirin (ASA) chewable tab 81 mg, 81 mg, Oral, DAILY, Dax Cabrales M.D., 81 mg at 05/28/23 0411    prasugrel (EFFIENT) tablet 10 mg, 10 mg, Oral, DAILY, Dax Cabrales M.D., 10 mg at 05/28/23 0410    senna-docusate (PERICOLACE or SENOKOT S) 8.6-50 MG per tablet 2 Tablet, 2 Tablet, Oral, BID, 2 Tablet at 05/28/23 0410 **AND** polyethylene glycol/lytes (MIRALAX) PACKET 1 Packet, 1 Packet, Oral, QDAY PRN, 1 Packet at 05/27/23 0437 **AND** magnesium hydroxide (MILK OF MAGNESIA) suspension 30 mL, 30 mL, Oral, QDAY PRN **AND** bisacodyl (DULCOLAX) suppository 10 mg, 10 mg, Rectal, QDAY PRN, Dax Cabrales M.D.    methadone (DOLOPHINE) tablet 20 mg, 20 mg, Oral, BID, Dax Cabrales M.D., 20 mg at 05/28/23 0410    dapagliflozin propanediol (Farxiga) tablet 10 mg, 10 mg, Oral, DAILY, Dax Cabrales M.D., 10 mg at 05/28/23 0411    acetaminophen (Tylenol) tablet 650 mg, 650 mg, Oral, Q4HRS PRN, Dax Cabrales M.D.    ondansetron (ZOFRAN ODT) dispertab 4 mg, 4 mg, Oral, Q4HRS PRN, Dax Cabrales M.D.    oxyCODONE immediate-release (ROXICODONE) tablet 5 mg, 5 mg, Oral, Q4HRS PRN **OR** oxyCODONE immediate release (ROXICODONE) tablet 10 mg, 10 mg, Oral, Q4HRS PRN, Dax Cabrales M.D., 10 mg at 05/27/23 1208    benzocaine-menthol (Cepacol) lozenge 1 Lozenge, 1 Lozenge, Mouth/Throat, Q2HRS PRN, Gael  REJI Reid, 1 Lozenge at 05/25/23 0615    Respiratory Therapy Consult, , Nebulization, Continuous RT, Prosper Cummings Jr., D.O.    hydrALAZINE (APRESOLINE) injection 10 mg, 10 mg, Intravenous, Q4HRS PRN, Prosper Cummings Jr., D.O.    ondansetron (ZOFRAN) syringe/vial injection 4 mg, 4 mg, Intravenous, Q4HRS PRN, Prosper Cummings Jr., D.O.    Current Outpatient Medications:  Medications Prior to Admission   Medication Sig Dispense Refill Last Dose    methadone (DOLOPHINE) 5 MG Tab Take 20 mg by mouth. 20mg in the morning, 25mg in the afternoon for chronic back pain and restless leg syndrome   5/23/2023 at 0800    [DISCONTINUED] atorvastatin (LIPITOR) 20 MG Tab Take 100 mg by mouth every evening.   5/22/2023 at 2100    [DISCONTINUED] hydroCHLOROthiazide (HYDRODIURIL) 25 MG Tab Take 25 mg by mouth every day. Unsure on dose frequency   5/23/2023 at 0800    [DISCONTINUED] losartan (COZAAR) 50 MG Tab Take 100 mg by mouth every day. Unsure on dose frequency   5/23/2023 at 0800       Medication Allergy:  Allergies   Allergen Reactions    Neomycin-Bacitracin-Polymyxin        Family History:  Family History   Problem Relation Age of Onset    Abdominal aortic aneurysm Mother     Dementia Father        Social History:  Social History     Socioeconomic History    Marital status:      Spouse name: Not on file    Number of children: Not on file    Years of education: Not on file    Highest education level: Not on file   Occupational History    Not on file   Tobacco Use    Smoking status: Every Day     Packs/day: 0.50     Years: 30.00     Pack years: 15.00     Types: Cigarettes    Smokeless tobacco: Not on file   Substance and Sexual Activity    Alcohol use: Not Currently    Drug use: Not Currently    Sexual activity: Not on file   Other Topics Concern    Not on file   Social History Narrative    Not on file     Social Determinants of Health     Financial Resource Strain: Not on file   Food Insecurity: Not on file  "  Transportation Needs: Not on file   Physical Activity: Not on file   Stress: Not on file   Social Connections: Not on file   Intimate Partner Violence: Not on file   Housing Stability: Not on file         Physical Exam:  Vitals/ General Appearance:   Weight/BMI: Body mass index is 34.67 kg/m².  /65   Pulse 77   Temp 36.6 °C (97.9 °F) (Temporal)   Resp 18   Ht 1.854 m (6' 1\")   Wt 119 kg (262 lb 12.6 oz)   SpO2 92%   Vitals:    05/27/23 1935 05/27/23 2349 05/28/23 0359 05/28/23 0741   BP: 101/68 92/65 121/87 103/65   Pulse: 98 91 66 77   Resp: 16 18 18 18   Temp: 36.8 °C (98.2 °F) 36.9 °C (98.4 °F) 37 °C (98.6 °F) 36.6 °C (97.9 °F)   TempSrc: Temporal Temporal Temporal Temporal   SpO2:  95% 96% 92%   Weight: 119 kg (262 lb 12.6 oz)      Height:         Oxygen Therapy:  Pulse Oximetry: 92 %, O2 (LPM): 0, O2 Delivery Device: None - Room Air    Constitutional:   Well developed, Well nourished, No acute distress  HENMT:  Normocephalic, Atraumatic, Oropharynx moist mucous membranes, No oral exudates, Nose normal.  No thyromegaly.  Eyes:  EOMI, Conjunctiva normal, No discharge.  Neck:  Normal range of motion, No cervical tenderness,  no JVD.  Cardiovascular:  Normal heart rate, Normal rhythm, No murmurs, No rubs, No gallops.   Extremitites with intact distal pulses, no cyanosis, or edema.  Lungs:  Normal breath sounds, breath sounds clear to auscultation bilaterally,  no rales, no rhonchi, no wheezing.   Abdomen: Bowel sounds normal, Soft, No tenderness, No guarding, No rebound, No masses, No hepatosplenomegaly.  Skin: Warm, Dry, No erythema, No rash, no induration.  Neurologic: Alert & oriented x 3, No focal deficits noted, cranial nerves II through X are intact.  Psychiatric: Affect normal, Judgment normal, Mood normal.      MDM (Data Review):     Records reviewed and summarized in current documentation    Lab Data Review:  Recent Results (from the past 24 hour(s))   CBC WITHOUT DIFFERENTIAL    Collection " Time: 05/28/23 12:04 AM   Result Value Ref Range    WBC 13.7 (H) 4.8 - 10.8 K/uL    RBC 3.92 (L) 4.70 - 6.10 M/uL    Hemoglobin 12.6 (L) 14.0 - 18.0 g/dL    Hematocrit 37.7 (L) 42.0 - 52.0 %    MCV 96.2 81.4 - 97.8 fL    MCH 32.1 27.0 - 33.0 pg    MCHC 33.4 32.3 - 36.5 g/dL    RDW 48.9 35.9 - 50.0 fL    Platelet Count 252 164 - 446 K/uL    MPV 10.5 9.0 - 12.9 fL   Comp Metabolic Panel    Collection Time: 05/28/23 12:04 AM   Result Value Ref Range    Sodium 137 135 - 145 mmol/L    Potassium 3.8 3.6 - 5.5 mmol/L    Chloride 99 96 - 112 mmol/L    Co2 26 20 - 33 mmol/L    Anion Gap 12.0 7.0 - 16.0    Glucose 94 65 - 99 mg/dL    Bun 21 8 - 22 mg/dL    Creatinine 0.84 0.50 - 1.40 mg/dL    Calcium 8.8 8.5 - 10.5 mg/dL    AST(SGOT) 63 (H) 12 - 45 U/L    ALT(SGPT) 141 (H) 2 - 50 U/L    Alkaline Phosphatase 69 30 - 99 U/L    Total Bilirubin 0.5 0.1 - 1.5 mg/dL    Albumin 3.6 3.2 - 4.9 g/dL    Total Protein 6.3 6.0 - 8.2 g/dL    Globulin 2.7 1.9 - 3.5 g/dL    A-G Ratio 1.3 g/dL   MAGNESIUM    Collection Time: 05/28/23 12:04 AM   Result Value Ref Range    Magnesium 1.9 1.5 - 2.5 mg/dL   PHOSPHORUS    Collection Time: 05/28/23 12:04 AM   Result Value Ref Range    Phosphorus 3.6 2.5 - 4.5 mg/dL   proBrain Natriuretic Peptide, NT    Collection Time: 05/28/23 12:04 AM   Result Value Ref Range    NT-proBNP 2028 (H) 0 - 125 pg/mL   CORRECTED CALCIUM    Collection Time: 05/28/23 12:04 AM   Result Value Ref Range    Correct Calcium 9.1 8.5 - 10.5 mg/dL   ESTIMATED GFR    Collection Time: 05/28/23 12:04 AM   Result Value Ref Range    GFR (CKD-EPI) 94 >60 mL/min/1.73 m 2       Imaging/Procedures Review:    Chest Xray:  Reviewed    EKG:   As in HPI.     MDM (Assessment and Plan):     Active Hospital Problems    Diagnosis     Ischemic cardiomyopathy [I25.5]     Atrial fibrillation (HCC) [I48.91]     Hypotension due to hypovolemia [I95.89, E86.1]     Leukocytosis [D72.829]     Transaminitis [R74.01]     TESS (acute kidney injury) (HCC)  [N17.9]     Restless leg syndrome [G25.81]     Tobacco abuse disorder [Z72.0]     STEMI (ST elevation myocardial infarction) ([facility]) [I21.3]     VT (ventricular tachycardia) ([facility]) [I47.20]     Acute respiratory failure with hypoxia ([facility]) [J96.01]     Cardiac arrest ([facility]) [I46.9]     Chronic pain [G89.29]     HTN (hypertension) [I10]     Hypercholesteremia [E78.00]     Hypokalemia [E87.6]          Ischemic Cardiomyopathy:  Chronically illed condition which requires ongoing close monitoring and treatment to improve survival rate along with decreasing risk of clinical decompensation sudden cardiac death and hospitalization.    Likely dehydrated, agreed with IV Fluid hydration.     Dry weight is 261 lbs.    Once BP better, will continue Toprol XL 25 mg daily, losartan 25 mg daily, spironolactone 25 mg daily.    Based on recent data on SGLT2 and heart failure with reduced ejection fraction, patient will be benefited from Farxiga 10 mg p.o. once a day for further reduction in mortality and hospitalization with absolute risk reduction of 4.9%.  Therefore, I will continue patient on Farxiga 10 mg p.o. once a day.  Risks and benefits were explained to patient and patient has agreed to proceed.    We will consider ICD placement for primary prevention in 3 months if left ventricular systolic function remains less than 35% after optimization of evidence based heart failure medical regimen. Await Lifevest placement before discharge.    I spent 5 minutes talking to patient about the danger of smoking. I advised patient and counseled patient on smoking cessation. Patient has promised to achieve goal of zero cigarettes per day.      Coronary arterial disease s/p RCA stent:  Continue Prasugrel, asa, BB, ARB  and statin at current dose.    For paroxysmal atrial fibrillation (post MI), currently in sinus with ectopies. Will continue Amiodarone 400 mg TID x 1 week then 400 mg BID x 1 week and 400 mg daily then 200 mg BID x1 then 200 mg  daily. Continue Eliquis 5 mg BID for 1 month.    Hypertension:  Optimize control using cardiomyopathy medical regimen as well.    Hyperlipidemia:  Optimize statin as within guidelines of CAD treatment as above.       Will sign off at this time, please call us with further questions.  Patient will be followed in the outpatient cardiology clinic for further cardiac care.    Thank you for referring this patient to our cardiology service.    Emil Pena MD.   [facility].

## 2023-05-28 NOTE — PROGRESS NOTES
Assumed care of patient, bedside report received from Mame day shift RN. Updated on plan of care, call light within reach and fall precautions are in place and bed lock and in lowest position. Patient instructed to call for assistance before getting out of bed. All questions answered at this time. No other needs.

## 2023-05-28 NOTE — PROGRESS NOTES
[id number] delivered to patient's room in preparation for discharge. However, patient has not received lifevest yet therefore cannot be discharged. Patient's wife took prescriptions that were delivered and will keep them at their hotel with his other personal belongings until time of discharge.

## 2023-05-28 NOTE — CARE PLAN
Problem: Knowledge Deficit - Standard  Goal: Patient and family/care givers will demonstrate understanding of plan of care, disease process/condition, diagnostic tests and medications  Outcome: Progressing  Note: Patient and family updated on plan of care by day team.      Problem: Pain - Standard  Goal: Alleviation of pain or a reduction in pain to the patient’s comfort goal  Outcome: Progressing     Problem: Skin Integrity  Goal: Skin integrity is maintained or improved  Outcome: Progressing     Problem: Fall Risk  Goal: Patient will remain free from falls  Outcome: Progressing   The patient is Stable - Low risk of patient condition declining or worsening    Shift Goals  Clinical Goals: monitor vitals, reduce anxiety  Patient Goals: sleep  Family Goals: sleep    Progress made toward(s) clinical / shift goals:      Patient is not progressing towards the following goals:

## 2023-05-28 NOTE — DISCHARGE INSTRUCTIONS
Diagnosis:  Acute Coronary Syndrome (ACS) is a diagnosis that encompasses cardiac-related chest pain and heart attack. ACS occurs when the blood flow to the heart muscle is severely reduced or cut off completely due to a slow process called atherosclerosis.  Atherosclerosis is a disease in which the coronary arteries become narrow from a buildup of fat, cholesterol, and other substances that combine to form plaque. If the plaque breaks, a blood clot will form and block the blood flow to the heart muscle. This lack of blood flow can cause damage or death to the heart muscle which is called a heart attack or Myocardial Infarction (MI). There are two different types of MIs:  ST Elevation Myocardial Infarction or STEMI (the most severe type of heart attack) and Non-ST Elevation Myocardial Infarction or NSTEMI.    Treatment Plan:  Cardiac Diet  - Low fat, low salt, low cholesterol   Cardiac Rehab  - Your doctor has ordered you a referral to [facility].  Call [phone] to schedule an appointment.  Attend my follow-up appointment with my Cardiologist.  Take my medications as prescribed by my doctor  Exercise daily  Quit Smoking, Lower my bad cholesterol and raise my good cholesterol, lower my blood pressure, and Reduce stress    Medications:  Certain medications are used to treat ACS.  Remember to always take medications as prescribed and never stop talking medications unless told by your doctor.    You have been prescribed the following medicatons:    Aspirin - Aspirin is used as a blood thinning medication and you will require this medication indefinitely.  Anti-platelet/blood thinner - Your Anti-platelet/Blood thinning medication is called apixaban/eliquis, and is used in combination with aspirin to prevent clots from forming in your heart and/or around your stent.  Your doctor will determine how long you need to be on this medicine.  Beta-Blocker - Beta-Blocker metoprolol/toprol xL is used to lower blood pressure and  heart rate, and/or helps your heart heal after a heart attack.  Statin - Statin atorvastatin/lipitor is used to lower cholesterol.  Angiotensin Receptor Blocker (ARB) - Angiotensin Receptor Blocker losartan/cozaar  HF Patient Discharge Instructions  Monitor your weight daily, and maintain a weight chart, to track your weight changes.   Activity as tolerated, unless your Doctor has ordered otherwise.  Follow a low fat, low cholesterol, low salt diet unless instructed otherwise by your Doctor. Read the labels on the back of food products and track your intake of fat, cholesterol and salt.   Fluid Restriction No. If a Fluid Restriction has been ordered by your Doctor, measure fluids with a measuring cup to ensure that you are not exceeding the restriction.   No smoking.  Oxygen No. If your Doctor has ordered that you wear Oxygen at home, it is important to wear it as ordered.  Did you receive an explanation from staff on the importance of taking each of your medications and why it is necessary to keep taking them unless your doctor says to stop? Yes  Were all of your questions answered about how to manage your heart failure and what to do if you have increased signs and symptoms after you go home? Yes  Do you feel like your heart failure care team involved you in the care treatment plan and allowed you to make decisions regarding your care while in the hospital and addressed any discharge needs you might have? Yes    See the educational handout provided at discharge for more information on monitoring your daily weight, activity and diet. This also explains more about Heart Failure, symptoms of a flare-up and some of the tests that you have undergone.     Warning Signs of a Flare-Up include:  Swelling in the ankles or lower legs.  Shortness of breath, while at rest, or while doing normal activities.   Shortness of breath at night when in bed, or coughing in bed.   Requiring more pillows to sleep at night, or needing to  sit up at night to sleep.  Feeling weak, dizzy or fatigued.     When to call your Doctor:  Call [employer] seven days a week from 8:00 a.m. to 8:00 p.m. for medical questions [phone].  Call your Primary Care Physician or Cardiologist if:   You experience any pain radiating to your jaw or neck.  You have any difficulty breathing.  You experience weight gain of 3 lbs in a day or 5 lbs in a week.   You feel any palpitations or irregular heartbeats.  You become dizzy or lose consciousness.   If you have had an angiogram or had a pacemaker or AICD placed, and experience:  Bleeding, drainage or swelling at the surgical / puncture site.  Fever greater than 100.0 F  Shock from internal defibrillator.  Cool and / or numb extremities.     Please access the AHA My HF Guide/Heart Failure Interactive Workbook:   [email or web address]   is used to lower blood pressure and treat heart failure.

## 2023-05-28 NOTE — DISCHARGE PLANNING
LSW notified pt's wife is inquiring about status of Life Vest. LSW made phone call to [employer] who reported everything has been submitted, they are just waiting for approval from Medicare. [employer] is unable to provide a time frame. Pt's wife updated.

## 2023-05-29 VITALS
BODY MASS INDEX: 34.74 KG/M2 | HEIGHT: 73 IN | DIASTOLIC BLOOD PRESSURE: 78 MMHG | SYSTOLIC BLOOD PRESSURE: 113 MMHG | RESPIRATION RATE: 16 BRPM | WEIGHT: 262.13 LBS | HEART RATE: 83 BPM | OXYGEN SATURATION: 91 % | TEMPERATURE: 98.2 F

## 2023-05-29 LAB
ALBUMIN SERPL BCP-MCNC: 3.1 G/DL (ref 3.2–4.9)
ALBUMIN/GLOB SERPL: 1.1 G/DL
ALP SERPL-CCNC: 65 U/L (ref 30–99)
ALT SERPL-CCNC: 100 U/L (ref 2–50)
ANION GAP SERPL CALC-SCNC: 15 MMOL/L (ref 7–16)
AST SERPL-CCNC: 46 U/L (ref 12–45)
BILIRUB SERPL-MCNC: 0.4 MG/DL (ref 0.1–1.5)
BUN SERPL-MCNC: 19 MG/DL (ref 8–22)
CALCIUM ALBUM COR SERPL-MCNC: 9.4 MG/DL (ref 8.5–10.5)
CALCIUM SERPL-MCNC: 8.7 MG/DL (ref 8.5–10.5)
CHLORIDE SERPL-SCNC: 99 MMOL/L (ref 96–112)
CO2 SERPL-SCNC: 19 MMOL/L (ref 20–33)
CREAT SERPL-MCNC: 0.7 MG/DL (ref 0.5–1.4)
EKG IMPRESSION: NORMAL
GFR SERPLBLD CREATININE-BSD FMLA CKD-EPI: 100 ML/MIN/1.73 M 2
GLOBULIN SER CALC-MCNC: 2.7 G/DL (ref 1.9–3.5)
GLUCOSE SERPL-MCNC: 122 MG/DL (ref 65–99)
POTASSIUM SERPL-SCNC: 4.3 MMOL/L (ref 3.6–5.5)
PROT SERPL-MCNC: 5.8 G/DL (ref 6–8.2)
SODIUM SERPL-SCNC: 133 MMOL/L (ref 135–145)

## 2023-05-29 PROCEDURE — 700102 HCHG RX REV CODE 250 W/ 637 OVERRIDE(OP): Performed by: NURSE PRACTITIONER

## 2023-05-29 PROCEDURE — A9270 NON-COVERED ITEM OR SERVICE: HCPCS | Performed by: NURSE PRACTITIONER

## 2023-05-29 PROCEDURE — 93005 ELECTROCARDIOGRAM TRACING: CPT | Performed by: PHYSICIAN ASSISTANT

## 2023-05-29 PROCEDURE — 99232 SBSQ HOSP IP/OBS MODERATE 35: CPT | Performed by: INTERNAL MEDICINE

## 2023-05-29 PROCEDURE — 700102 HCHG RX REV CODE 250 W/ 637 OVERRIDE(OP): Performed by: HOSPITALIST

## 2023-05-29 PROCEDURE — 99239 HOSP IP/OBS DSCHRG MGMT >30: CPT | Performed by: HOSPITALIST

## 2023-05-29 PROCEDURE — 80053 COMPREHEN METABOLIC PANEL: CPT

## 2023-05-29 PROCEDURE — A9270 NON-COVERED ITEM OR SERVICE: HCPCS | Performed by: HOSPITALIST

## 2023-05-29 PROCEDURE — 93010 ELECTROCARDIOGRAM REPORT: CPT | Performed by: INTERNAL MEDICINE

## 2023-05-29 PROCEDURE — 36415 COLL VENOUS BLD VENIPUNCTURE: CPT

## 2023-05-29 RX ORDER — FUROSEMIDE 20 MG/1
20 TABLET ORAL DAILY
Qty: 30 TABLET | Refills: 0 | Status: SHIPPED | OUTPATIENT
Start: 2023-05-29 | End: 2023-06-28

## 2023-05-29 RX ADMIN — APIXABAN 5 MG: 5 TABLET, FILM COATED ORAL at 04:35

## 2023-05-29 RX ADMIN — METHADONE HYDROCHLORIDE 20 MG: 10 TABLET ORAL at 04:34

## 2023-05-29 RX ADMIN — SPIRONOLACTONE 25 MG: 25 TABLET ORAL at 04:35

## 2023-05-29 RX ADMIN — FUROSEMIDE 20 MG: 20 TABLET ORAL at 04:34

## 2023-05-29 RX ADMIN — PRASUGREL 10 MG: 10 TABLET, FILM COATED ORAL at 04:35

## 2023-05-29 RX ADMIN — AMIODARONE HYDROCHLORIDE 400 MG: 200 TABLET ORAL at 04:34

## 2023-05-29 RX ADMIN — ASPIRIN 81 MG 81 MG: 81 TABLET ORAL at 04:34

## 2023-05-29 RX ADMIN — DAPAGLIFLOZIN 10 MG: 10 TABLET, FILM COATED ORAL at 04:35

## 2023-05-29 RX ADMIN — SENNOSIDES AND DOCUSATE SODIUM 2 TABLET: 50; 8.6 TABLET ORAL at 04:34

## 2023-05-29 ASSESSMENT — ENCOUNTER SYMPTOMS
NAUSEA: 0
COUGH: 0
ORTHOPNEA: 1
BLOOD IN STOOL: 0
DIZZINESS: 0
PND: 0
SHORTNESS OF BREATH: 0
PALPITATIONS: 0

## 2023-05-29 NOTE — PROGRESS NOTES
Discharge instructions reviewed with patient and wife at bedside. Medications & follow up care reviewed. All questions addressed. Patient still awaiting lifevest at this time.

## 2023-05-29 NOTE — PROGRESS NOTES
Life vest delivered & patient discharged home with wife. All personal belongings taken home at time of discharge.

## 2023-05-29 NOTE — PROGRESS NOTES
Assumed care of patient and received report from RN. Tele monitor in place and patient in SR. VS stable. A&Ox4. Pain 0/10. POC discussed with patient and verbalized understanding. Call light in reach. Fall precautions in place. Bed locked in lowest position.

## 2023-05-29 NOTE — PROGRESS NOTES
Hospital Medicine Daily Progress Note    Date of Service  5/28/2023    Chief Complaint  [place]am is a 68 y.o. male admitted 5/23/2023 with an acute inferior ST elevation myocardial infarction    Hospital Course  68 y.o. male who presented 5/23/2023 with a past med history of hypertension, dyslipidemia, chronic pain, restless leg syndrome, tobacco abuse, presents on 5/23/2023 from [place] at the [facility] in [place] with an ST elevation myocardial infarction, complicated by a cardiac arrest in route to the hospital, the patient had a total of 16 shocks, CPR, received lidocaine as well as amiodarone, the patient obtained ROSC and was neurologically intact, subsequently was intubated and brought to the Cath Lab for intervention, he underwent PCI and drug-eluting stenting x1 to his RCA with SHADIA II flow, the patient was found to have significant akinesis of the entire inferior wall and elevated LV filling pressures, was brought to the ICU sedated, on propofol, moving all 4 extremities, requiring pressor therapy for blood pressure support.  On 5/24 the patient was extubated in the morning, he had additional episode of 32nd VT, the patient later in the afternoon is reported to be further stabilized and to be transferred out of ICU to the [facility] level of care for the time being.  Patient continues to have chest pain from CPR, he denies current dizziness, no nausea vomiting, no fevers chills.  Patient was started on diuretics currently.  The patient was identified to have a ischemic cardiomyopathy now with ejection fraction of 25% with global hypokinesis.  Currently patient is afebrile, his heart rate is in the 50s, respiration is unlabored, the patient is still on nasal cannula oxygen, currently saturating in the 90s, blood pressure is soft coming off norepinephrine, currently around 100/60,  His laboratory data indicates a white cell count of 22.1, hemoglobin 13.7, platelet count 379, chemistry with  a sodium of 139, potassium 4.2, chloride 101, bicarb 22, glucose 153, BUN 26, creatinine 1.37, , , a total protein of 5.6, albumin 3.5, CPK 7065, cholesterol panel with a LDL of 83, HDL 50, urine drug screen positive for methadone, troponin level initially at 28,    Interval Problem Update  Patient seen and examined today.  Data, Medication data reviewed.  Case discussed with nursing as available.  Plan of Care reviewed with patient and notified of changes.   5/25 the patient feels much better, he still has post CPR chest wall pain, he denies dyspnea, he is currently coming off oxygen, he had a fever last night, chest x-ray without infiltrate, Pro-Preet is low, mild cough, no other infectious symptoms currently verbalized, family at the bedside, given detailed updates on the patient's current condition, data and projected hospital course.  Cardiology has implemented GDMT, currently the patient's blood pressure is not sufficient to support aggressive medication therapy.  White count 16.5, hemoglobin 12.3, platelet count 217, FTs are improving, , , echocardiogram reported with a EF of 25%, global hypokinesis with regional variation, grade 1 diastolic dysfunction,  Chest x-ray without cardiopulmonary abnormality  5/26 the patient is anxious, uncomfortable, he is unable to sleep in the bed, he has some chronic back pain, he denies currently withdrawing from tobacco, no further chest pain, continues currently in rate controlled atrial fibrillation, afebrile, heart rate in the 70s to 100s, the patient is currently on room air, 97%, blood pressure is still on the low side in the 95-1 teens range over 60s, laboratory data with improved white cell count of 13, hemoglobin 12.0, platelet count 209, chemistry with potassium 4.0, glucose 105, calcium 8.8, improving LFTs down to 185 and 218 respectively, CPK improved to 951, magnesium 2.1,  5/27 the patient's anxiety level has improved, he feels overall  better, no chest pain currently, currently afebrile, heart rate in the 60s and 80s, respiration unlabored, currently on room air, 90 to 94% saturation, blood pressure remains soft, but primarily in the 100s over 60s, laboratory data indicates white count 12.1, hemoglobin 12.6, hematocrit 36, platelet count is stable, chemistry improving, improving AST, ALT, currently, cardiology is planning for a LifeVest secondary to the patient's poor ejection fraction failsafe.  Ongoing titration with GDMT  Extended discussion with family at the bedside, updates provided, needs addressed.    5/28 the patient feels overall better, we tailored to his current cardiac regimen, discussed with cardiology in detail  The patient is discharged with a LifeVest, this is currently pending, contacted the [facility] for delivery  The plan is for the patient to discharge to [place] with family and then further travel to his hometown at a later point  Requested his medication regimen for at least a week so he can establish with his home physician and cardiologist which has already been arranged for  The patient is currently improved, no dizziness, no lightheadedness, dyspnea has improved.  Still having significant amount of chronic pain as well as anxiety, medication regimen provided  Currently afebrile, on room air, saturating 91%, unlabored respirations, blood pressure in the 1 teens over 60s,      I have discussed this patient's plan of care and discharge plan at IDT rounds today with Case Management, Nursing, Nursing leadership, and other members of the IDT team.    Consultants/Specialty  cardiology and critical care    Code Status  Full Code    Disposition  The patient is medically cleared for discharge to home or a post-acute facility.  Anticipate discharge to: home with close outpatient follow-up    I have placed the appropriate orders for post-discharge needs.    Review of Systems  Review of Systems   Constitutional:  Positive for  malaise/fatigue.   HENT: Negative.     Eyes: Negative.    Respiratory:  Positive for cough.    Cardiovascular:  Positive for chest pain and palpitations. Negative for leg swelling.   Gastrointestinal: Negative.    Genitourinary: Negative.    Musculoskeletal:  Positive for back pain and myalgias.   Skin: Negative.    Neurological:  Positive for weakness.   Endo/Heme/Allergies: Negative.    Psychiatric/Behavioral:  The patient is nervous/anxious.    All other systems reviewed and are negative.       Physical Exam  Temp:  [36.6 °C (97.9 °F)-37 °C (98.6 °F)] 36.7 °C (98.1 °F)  Pulse:  [66-98] 71  Resp:  [16-18] 18  BP: ()/(65-87) 111/71  SpO2:  [91 %-96 %] 91 %    Physical Exam  Vitals and nursing note reviewed.   Constitutional:       Appearance: He is well-developed.      Comments: Pt seen and examined.   HENT:      Head: Normocephalic and atraumatic.   Eyes:      Pupils: Pupils are equal, round, and reactive to light.   Cardiovascular:      Rate and Rhythm: Normal rate and regular rhythm.      Heart sounds: Normal heart sounds.   Pulmonary:      Effort: Pulmonary effort is normal.      Breath sounds: Rhonchi present.   Abdominal:      General: Bowel sounds are normal.      Palpations: Abdomen is soft.   Musculoskeletal:         General: Normal range of motion.      Cervical back: Normal range of motion and neck supple.   Skin:     General: Skin is warm and dry.   Neurological:      General: No focal deficit present.      Mental Status: He is alert and oriented to person, place, and time.   Psychiatric:         Behavior: Behavior normal.         Fluids    Intake/Output Summary (Last 24 hours) at 5/28/2023 1723  Last data filed at 5/28/2023 0315  Gross per 24 hour   Intake 240 ml   Output 1500 ml   Net -1260 ml         Laboratory  Recent Labs     05/26/23  0436 05/27/23  0733 05/28/23  0004   WBC 13.0* 12.1* 13.7*   RBC 3.72* 3.76* 3.92*   HEMOGLOBIN 12.0* 12.6* 12.6*   HEMATOCRIT 35.9* 36.0* 37.7*   MCV 96.5  95.7 96.2   MCH 32.3 33.5* 32.1   MCHC 33.4 35.0 33.4   RDW 49.6 47.9 48.9   PLATELETCT 209 217 252   MPV 10.2 10.3 10.5       Recent Labs     05/26/23  0436 05/27/23  1004 05/28/23  0004   SODIUM 136 136 137   POTASSIUM 4.0 3.9 3.8   CHLORIDE 100 98 99   CO2 28 25 26   GLUCOSE 105* 113* 94   BUN 21 20 21   CREATININE 0.63 0.79 0.84   CALCIUM 8.8 8.7 8.8                         Imaging  DX-CHEST-PORTABLE (1 VIEW)   Final Result      No acute cardiopulmonary abnormality.         EC-ECHOCARDIOGRAM COMPLETE W/ CONT   Final Result      DX-CHEST-PORTABLE (1 VIEW)   Final Result      1.  Low lung volumes without definite acute cardiopulmonary abnormality.      DX-ABDOMEN FOR TUBE PLACEMENT   Final Result      Nasogastric tube tip terminates within the stomach.      DX-CHEST-PORTABLE (1 VIEW)   Final Result      1.  No acute cardiac or pulmonary abnormalities are identified.   2.  Endotracheal tube tip at the level of the clavicular heads      CL-LEFT HEART CATHETERIZATION WITH POSSIBLE INTERVENTION    (Results Pending)          Assessment/Plan  * STEMI (ST elevation myocardial infarction) (HCC)- (present on admission)  Assessment & Plan  Underwent PCI with SABINO x1 to his RCA with SHADIA II flow.  He was noted to have akinesis of the entire inferior wall and elevated LV filling pressures consistent with decompensated heart failure.   Echocardiogram confirms EF of 25%, global hypokinesis  - monitor and replete lytes appropriately  - prasugrel, aspirin  - statin, high intensity  Initiate beta-blockade once tolerating  Patient has been titrated off Levophed  Forced diuresis as tolerated  Postcardiac follow-up, follow-up echocardiogram and a certain interval  GDMT as tolerated    Ischemic cardiomyopathy  Assessment & Plan  Acute, ejection fraction 25%  GDMT  Titrating cardiac medication  LifeVest as failsafe    Atrial fibrillation (HCC)  Assessment & Plan  Likely new onset, secondary to patient's ischemic event  Ongoing  antiarrhythmic therapy with amiodarone  Adjust anticoagulation accordingly, given the patient's poor ejection fraction, new onset  Close telemetry monitoring  Patient currently converted to sinus rhythm on 5/27    Cardiac arrest (HCC)- (present on admission)  Assessment & Plan  VT arrest due to RCA occlusion - underwent multiple rounds of defibrillation, CPR, ACLS   now s/p PCI to RCA  Monitor closely, optimize medical therapy  Patient with significant reduction of ejection fraction,  Cardiology following  Stabilized to transfer out of CICU    Acute respiratory failure with hypoxia (HCC)- (present on admission)  Assessment & Plan  Intubated periarrest  Extubated on 5/24 following cath and now on NC - wean as tolerated  Pulmonary toilet, monitor  Forced diuresis, the patient with some degree of orthopnea    TESS (acute kidney injury) ([facility])- (present on admission)  Assessment & Plan  Likely multifactorial from cardiac arrest/hypoperfusion and potentially cardiorenal now given e/o heart failure  Monitor  closely, avoid nephrotoxins      Hypotension due to hypovolemia  Assessment & Plan  Patient with poor systolic function,  Relative bradycardia secondary to acute MI  Gentle fluid resuscitation  Patient currently does not have much room for goal-directed medical therapy titration    Tobacco abuse disorder- (present on admission)  Assessment & Plan  Strongly suggested to engage in lifelong smoking cessation    Restless leg syndrome- (present on admission)  Assessment & Plan  Existing, symptomatic treatment    Transaminitis- (present on admission)  Assessment & Plan  Following cardiac arrest - likely from ischemia and component of congestion from decompensated heart failure  Monitor closely, avoid offending agents      Leukocytosis- (present on admission)  Assessment & Plan  No fevers or clinical e/o infection  Likely reactive, following cardiac arrest  Monitor closely,  Low threshold to treat for possible pulmonary  infection given the patient's prolonged cardiac resuscitation    Hypokalemia- (present on admission)  Assessment & Plan  Replete to maintain >4  Monitor and replete mag    Hypercholesteremia- (present on admission)  Assessment & Plan  Maximized statin therapy in form of atorvastatin 80 mg daily      HTN (hypertension)- (present on admission)  Assessment & Plan  Holding home antihypertensives secondary to hypotension post intervention  Monitor closely and reinstitute as indicated and tolerated    Chronic pain- (present on admission)  Assessment & Plan  Restart home methadone at home dosing  PRN oxy    VT (ventricular tachycardia) (HCC)- (present on admission)  Assessment & Plan  S/p cardioversion on 5/23 multiple times - had one episode of NSVT for 30 seconds today w/o hemodynamic compromise.  - optimized lytes  - monitor  S/p administration of amiodarone, lidocaine  Monitor closely,  LifeVest on discharge given the patient's poor ejection fraction, high risk for additional cardiac dysrhythmia, sudden cardiac death    Plan  Further adjustment of his cardiac medication as currently outlined  Awaiting LifeVest arrangements, patient lives remotely, significant risk of additional cardiac dysrhythmia  Continue close monitoring,  Agree with diuresis to volume reduce, monitor closely  Continue amiodarone as per cardiology, tapering dose established  Initiate full anticoagulation, close monitoring  Titrate medication very slowly as the patient still has marginal blood pressures  Anxiolytics to continue, currently improved status  Close telemetry monitoring  Optimize electrolytes  See orders  Patient is has a high medical complexity, complex decision making and is at high risk for complication, morbidity, and mortality.  My total time spent caring for the patient on the day of the encounter was 54 minutes.   This does not include time spent on separately billable procedures/tests.  Detailed discharge instructions provided  including medication adjustment in case of high potential, bradycardia,  Instructed to obtain a medical alert bracelet for anticoagulation  Meds to beds provided  Detailed instructions in terms of follow-up, the patient already has follow-up established now with a primary care physician as well as cardiologist in his hometown area  Family education, patient education provided at the bedside in extent    VTE prophylaxis: enoxaparin ppx    I have performed a physical exam and reviewed and updated ROS and Plan today (5/28/2023). In review of yesterday's note (5/27/2023), there are no changes except as documented above.      Please note that this dictation was created using voice recognition software. I have made every reasonable attempt to correct obvious errors, but I expect that there are errors of grammar and possibly context that I did not discover before finalizing the note.

## 2023-05-29 NOTE — DISCHARGE SUMMARY
Discharge Summary    CHIEF COMPLAINT ON ADMISSION  Inferior ST elevation myocardial infarction      Reason for Admission  Acute myocardial infarction    Admission Date  5/23/2023    CODE STATUS  Full Code    HPI & HOSPITAL COURSE  68 y.o. male who presented 5/23/2023 with a past med history of hypertension, dyslipidemia, chronic pain, restless leg syndrome, tobacco abuse, presents on 5/23/2023 from [place] at the [facility] in [place] with an ST elevation myocardial infarction, complicated by a cardiac arrest in route to the hospital, the patient had a total of 16 shocks, CPR, received lidocaine as well as amiodarone, the patient obtained ROSC and was neurologically intact, subsequently was intubated and brought to the Cath Lab for intervention, he underwent PCI and drug-eluting stenting x1 to his RCA with SHADIA II flow, the patient was found to have significant akinesis of the entire inferior wall and elevated LV filling pressures, was brought to the ICU sedated, on propofol, moving all 4 extremities, requiring pressor therapy for blood pressure support.  On 5/24 the patient was extubated in the morning, he had additional episode of 32nd VT, the patient later in the afternoon is reported to be further stabilized and to be transferred out of ICU to the [facility] level of care for the time being.  Patient continues to have chest pain from CPR, he denies current dizziness, no nausea vomiting, no fevers chills.  Patient was started on diuretics currently.  The patient was identified to have a ischemic cardiomyopathy now with ejection fraction of 25% with global hypokinesis.  Patient had careful titration of goal-directed medical therapy for his new ischemic cardiomyopathy, he did have significant intermittent anxiety did require treatment  The patient felt in need of a LifeVest secondary to the patient's cardiac arrest, dysrhythmia and low ejection fraction.  The patient had a brief episode of atrial  fibrillation, he converted back to a sinus rhythm after medication and initiation  He is currently on triple therapy dual antiplatelet therapy as well as apixaban  The patient improved overall gradually and at this point is able to be discharged in a medically stable condition of the patient had a LifeVest fitted  .  He is set up to see his primary care physician as well as cardiologist on [place]  The patient was carefully monitored, his blood pressure was sufficient, he had no further significant dysrhythmias  Tolerating current medication regimen which was provided to the bedside on discharge    Therefore, he is discharged in fair and stable condition to home with close outpatient follow-up.    The patient met 2-midnight criteria for an inpatient stay at the time of discharge.    Discharge Date  5/29/2023    FOLLOW UP ITEMS POST DISCHARGE  Close cardiology follow-up for repeat echocardiogram and further medication titration      DISCHARGE DIAGNOSES  Principal Problem:    STEMI (ST elevation myocardial infarction) (HCC) (POA: Yes)  Active Problems:    Acute respiratory failure with hypoxia (HCC) (POA: Yes)    Cardiac arrest (HCC) (POA: Yes)    Atrial fibrillation (HCC) (POA: Unknown)    Ischemic cardiomyopathy (POA: Unknown)    TESS (acute kidney injury) (HCC) (POA: Yes)    VT (ventricular tachycardia) (HCC) (POA: Yes)    Chronic pain (POA: Yes)    HTN (hypertension) (POA: Yes)    Hypercholesteremia (POA: Yes)    Hypokalemia (POA: Yes)    Leukocytosis (POA: Yes)    Transaminitis (POA: Yes)    Restless leg syndrome (POA: Yes)    Tobacco abuse disorder (POA: Yes)    Hypotension due to hypovolemia (POA: Unknown)  Resolved Problems:    * No resolved hospital problems. *      FOLLOW UP    [facility]  [address]  [address]  [place] [id number] [phone]  Call  Your doctor has referred you for Cardiac Rehab, which is important  for your recovery. Please call to make an appointment, or speak with your local doctor to find a program in your area.    [facility] ([facility]) - Primary Care and Family Medicine  [address] [place] [id number]  [phone]  Go in 2 day(s)  Go Tuesday at 0800 for CHF follow up appointment    PCP    Call in 1 week(s)  Follow up with your PCP to review your hospital stay and new medications. You will also need to follow up with a cardiologist. If you do not have a cardiologist your PCP can refer you to one close to home to establish care with.      MEDICATIONS ON DISCHARGE     Medication List        START taking these medications        Instructions   ALPRAZolam 0.25 MG Tabs  Commonly known as: XANAX   Take 1 Tablet by mouth 4 times a day as needed for Anxiety or Sleep for up to 10 days.  Dose: 0.25 mg     * amiodarone 400 MG tablet  Commonly known as: PACERONE   Take 1 Tablet by mouth 2 times a day for 14 days.  Dose: 400 mg     * amiodarone 200 MG Tabs  Start taking on: June 12, 2023  Commonly known as: Cordarone   Take 1 Tablet by mouth every day for 60 days.  Dose: 200 mg     Aspirin Low Dose 81 MG Chew chewable tablet  Generic drug: aspirin   Chew 1 Tablet every day for 11 days.  Dose: 81 mg     Farxiga 10 MG Tabs  Generic drug: dapagliflozin propanediol   Take 1 Tablet by mouth every day.  Dose: 10 mg     furosemide 20 MG Tabs  Commonly known as: LASIX   Take 1 Tablet by mouth every day for 30 days.  Dose: 20 mg     metoprolol SR 25 MG Tb24  Commonly known as: TOPROL XL   Take 1 Tablet by mouth every evening.  Dose: 25 mg     prasugrel 10 MG Tabs  Commonly known as: EFFIENT   Take 1 Tablet by mouth every day.  Dose: 10 mg     spironolactone 25 MG Tabs  Commonly known as: ALDACTONE   Take 1 Tablet by mouth every day.  Dose: 25 mg           * This list has 2 medication(s) that are the same as other medications prescribed for you. Read the directions carefully, and ask your doctor or other  care provider to review them with you.                CHANGE how you take these medications        Instructions   atorvastatin 80 MG tablet  What changed:   medication strength  how much to take  when to take this  Commonly known as: LIPITOR   Take 1 Tablet by mouth every evening for 120 days.  Dose: 80 mg     losartan 25 MG Tabs  What changed:   medication strength  how much to take  when to take this  additional instructions  Commonly known as: COZAAR   Take 1 Tablet by mouth every evening for 90 days.  Dose: 25 mg     methadone 10 MG Tabs  What changed:   medication strength  when to take this  additional instructions  Commonly known as: DOLOPHINE   Take 2 Tablets by mouth 2 times a day for 10 days.  Dose: 20 mg            STOP taking these medications      hydroCHLOROthiazide 25 MG Tabs  Commonly known as: HYDRODIURIL              Allergies  Allergies   Allergen Reactions    Neomycin-Bacitracin-Polymyxin        DIET  Orders Placed This Encounter   Procedures    Diet Order Diet: Cardiac     Standing Status:   Standing     Number of Occurrences:   1     Order Specific Question:   Diet:     Answer:   Cardiac [6]       ACTIVITY  As tolerated.  Weight bearing as tolerated  Low-level activity until the patient is seen by cardiology, outpatient cardiac rehab recommended    CONSULTATIONS  Critical care, cardiology    PROCEDURES  Vito Arriaga M.D.  Physician  Interventional Cardiology  Procedures      Signed  Date of Service:  5/23/2023  6:23 PM                                                           Cardiac Catheterization Procedure Note     DATE: 5/23/2023     : Vito Arriaga MD     PROCEDURES PERFORMED:  Left heart catheterization with left ventriculography  Coronary angiography  Instantaneous wave free ratio assessment of the right coronary artery  Aspiration thrombectomy and percutaneous coronary intervention to the thrombotically occluded proximal to mid right coronary artery  Moderate conscious  "sedation     INDICATIONS:  The patient is a 68-year-old gentleman who presented with sudden onset of severe chest pain with inferior ST elevations on ECG.  He subsequently suffered multiple VT/VF arrest requiring defibrillation and intubation.  He was brought emergently to the cardiac catheterization laboratory emergently.     CONSENT:  Signed informed consent was not obtained as the patient was intubated and sedated and the procedure was considered emergent.     MEDICATIONS:  Lidocaine  Fentanyl  Midazolam  Nitroglycerin  Verapamil  Heparin  Lidocaine  Amiodarone  Phenylephrine  Eptifibatide     CONTRAST: Omnipaque 84 cc     ACCESS:  6-Swedish Glidesheath unsuccessfully placed in the right radial artery.  6-Swedish Quapaw sheath successfully placed in the right femoral artery.     ESTIMATED BLOOD LOSS: 20 cc     COMPLICATIONS: None     PROCEDURE IN DETAIL:  The patient was brought to the cardiac catheterization laboratory emergently.  While preparing the patient, he had multiple episodes of recurrent VF/VT requiring additional defibrillation and administration of intravenous lidocaine and amiodarone.  The skin over the right wrist was prepped and draped in the usual sterile fashion.  Right radial access could not be successfully obtained due to tortuosity in the right radial artery despite punctures at multiple access sites.  Therefore, femoral access was obtained using ultrasound guidance and a 6-Swedish Quapaw sheath.  A hemostatic band was placed over the right radial artery for hemostasis.     After obtaining access, a 6-Swedish JR-4 guide catheter was advanced over a J-wire into the aortic root and was used to engage the ostium of the right coronary.  Cineangiography demonstrated a complete proximal thrombotic occlusion and we proceeded with intervention.  A 0.014\" Run-through wire was then advanced across the lesion and placed in the distal aspect of the vessel.  A 2.5 x 12 mm compliant balloon was then " advanced over the guidewire and was used to pre-dilate the lesion at nominal pressure.  As there was a large amount of thrombus burden, a Priority 1 aspiration catheter was used to perform two aspiration passes with removal of a large amount of red thrombus.  Following aspiration, a 4.0 x 38 mm [place] drug-eluting stent was advanced over the guidewire and was deployed across the lesion at a maximum pressure of 16 camilla.  After deployment, the stent was evaluated by IVUS.  This demonstrated that the distal aspect of the stent landed in an area of mild to moderate concentric plaque with adequate apposition.  The mid aspect of the stent was mildly underexpanded, but still achieved a minimal luminal area of 11.5 mm².  The proximal aspect of the stent landed in an area of moderate, mostly eccentric plaque with adequate apposition.  Given the extent of thrombus burden, no postdilation was performed.  Final cineangiograms were obtained in orthogonal views, which demonstrated excellent stent expansion and apposition with no evidence of wire perforation, thrombus or dissection.       Following intervention, the JR catheter was exchanged for a 6-Guyanese JL-4 diagnostic catheter, which was used to engage the ostium of the left main coronary artery.  Cineangiograms were then performed in multiple projections for complete evaluation of the left coronary system.  This catheter was then exchanged for a 6-Guyanese pigtail catheter, which was advanced into the left ventricular cavity.  This catheter was used to perform a left ventriculogram and then was withdrawn across the aortic valve with sequential pressures measured.  At the completion of the case, all wires, catheters, and sheaths were removed.  A 6-Guyanese Angio-Seal device was deployed in the right femoral artery after confirming appropriate sheath placement by limited right iliofemoral angiography.     HEMODYNAMICS:   Aortic pressure: 116/85 mmHg  Pre A-wave pressure: 24 mmHg  No  significant aortic gradient on pullback     LEFT VENTRICULOGRAPHY:  Estimated left ventricular ejection fraction 40-45%  Akinesis of the entire inferior wall extending into the inferior apex.     CORONARY ANGIOGRAPHY:  The left main coronary artery is a short, patent vessel that bifurcates into the left anterior descending and left circumflex coronary arteries.  The left anterior descending coronary artery is a moderate vessel with proximal luminal irregularities prior to supplying two moderate to large diagonal branches that share a common ostium.  The lower diagonal branch has a focal mid 60% stenosis.  The ongoing LAD after the diagonal branches has a prominent mid-distal myocardial bridge, but otherwise luminal irregularities.  The left circumflex coronary artery is a moderate, nondominant vessel that supplies a small first obtuse marginal branch, a moderate second obtuse marginal branch, and two small distal obtuse marginal branches and has luminal irregularities in the distribution.  The right coronary artery is a large, dominant vessel that is proximally occluded with thrombus.  Following intervention, the vessel seem to supply a large posterior descending coronary that was distally occluded with embolized thrombus and a very large posterolateral branch with luminal irregularities.     PERCUTANEOUS CORONARY INTERVENTION:  Lesion location: Proximal right coronary artery thrombotic occlusion  Lesion type: C  Lesion length: 20 mm  Pre-intervention SHADIA flow: 0  Post-intervention SHADIA flow: 2  Pre-intervention stenosis: 100%  Post-intervention stenosis: 10%  Stent: 4.0 x 38 [place] mm Resolute [place] drug-eluting stent     IMPRESSION:  Severe obstructive one-vessel coronary artery disease with an acute thrombotic occlusion of the proximal right coronary artery successfully treated with one 4.0 x 38 mm drug-eluting stent.  Prominent mid-distal left anterior descending coronary myocardial bridge.  Mildly reduced left  ventricular systolic function with inferior wall akinesis.  Mild to moderate elevated left heart filling pressures consistent with acute decompensated ischemic heart failure.     RECOMMENDATIONS:  Admit to ICU for further management.  TR band release per protocol.  Bedrest for at least 4 hours with routine groin precautions.  Place OG tube and give 60 mg of prasugrel immediately upon arrival in the ICU.  Dual antiplatelet therapy for at least 6-12 months if tolerated with aspirin 81 mg daily and prasugrel 10 mg daily if no contraindications.  Optimal medical management of acute myocardial infarction and ischemic cardiomyopathy.  Referral to cardiac rehabilitation on discharge.     NOTIFICATION:  The patient's family was notified of the results of his cardiac catheterization.          LABORATORY  Lab Results   Component Value Date    SODIUM 133 (L) 05/29/2023    POTASSIUM 4.3 05/29/2023    CHLORIDE 99 05/29/2023    CO2 19 (L) 05/29/2023    GLUCOSE 122 (H) 05/29/2023    BUN 19 05/29/2023    CREATININE 0.70 05/29/2023        Lab Results   Component Value Date    WBC 13.7 (H) 05/28/2023    HEMOGLOBIN 12.6 (L) 05/28/2023    HEMATOCRIT 37.7 (L) 05/28/2023    PLATELETCT 252 05/28/2023      Detailed instructions were provided to the patient terms of his medication regimen, how to react for bradycardia, hypotension, increasing lower extremity edema  The patient to obtain a anticoagulation alert bracelet      Total time of the discharge process 65 minutes.

## 2023-05-29 NOTE — CARE PLAN
The patient is Stable - Low risk of patient condition declining or worsening    Shift Goals  Clinical Goals: lifevest and tele  Patient Goals: discharge  Family Goals: sleep    Progress made toward(s) clinical / shift goals:      Problem: Knowledge Deficit - Standard  Goal: Patient and family/care givers will demonstrate understanding of plan of care, disease process/condition, diagnostic tests and medications  Outcome: Progressing  Note: Pt and wife educated on POC and diseases processes. Pt verbalized understanding of medication regimen and interventions.      Problem: Fall Risk  Goal: Patient will remain free from falls  Outcome: Progressing  Note: Fall precautions in place. Pt is up-self and independent therefore does not need to jorge l prior to getting up. Bed locked in lowest position. Call light and belongings in reach. Mobility assssed qshift.

## 2023-05-29 NOTE — PROGRESS NOTES
Cardiology Progress Note    Name:   [place] Elam   YOB: 1955  Age:   68 y.o.  male   MRN:   [id number]    Attending Cardiologist:Dr Dover    CC: cardiac arrest     History of Present Illness  69 yo male without known cardiac problems presented with out of hospital VF arrest, ROSC, inferior STEMI s/p emergent PCI to proximal RCA. TTE this admission shows severely reduced Lv systolic function, EF 25%.     Interim Events   Has some leg swelling still and is orthopneic. Very anxious for the lifevest fitting. No chest pain at rest or with ambulation.   His wife is at bedside, she has arranged follow up with their local cardiology group.      Review of Systems   Constitutional:         No unexpected weight changes   Respiratory:  Negative for cough and shortness of breath.    Cardiovascular:  Positive for orthopnea and leg swelling. Negative for chest pain, palpitations and PND.   Gastrointestinal:  Negative for blood in stool, melena and nausea.   Genitourinary:  Negative for hematuria.   Neurological:  Negative for dizziness.       Medical History  Past Surgical History:   Procedure Laterality Date    KNEE ARTHROPLASTY TOTAL Bilateral        Family History   Problem Relation Age of Onset    Abdominal aortic aneurysm Mother     Dementia Father        Social History     Tobacco Use   Smoking Status Every Day    Packs/day: 0.50    Years: 30.00    Pack years: 15.00    Types: Cigarettes   Smokeless Tobacco Not on file       Allergies   Allergen Reactions    Neomycin-Bacitracin-Polymyxin          Medications   Scheduled Medications   Medication Dose Frequency    spironolactone  25 mg Q DAY    amiodarone  400 mg TWICE DAILY    [START ON 6/12/2023] amiodarone  200 mg Q DAY    metoprolol SR  25 mg Q EVENING    furosemide  20 mg Q DAY    apixaban  5 mg BID    losartan  25 mg Q EVENING    atorvastatin  80 mg Q EVENING    aspirin  81 mg DAILY    prasugrel  10 mg DAILY    senna-docusate  2 Tablet BID  "   methadone  20 mg BID    dapagliflozin propanediol  10 mg DAILY           Physical Exam  BP 97/51   Pulse 83   Temp 36.8 °C (98.2 °F) (Temporal)   Resp 16   Ht 1.854 m (6' 1\")   Wt 119 kg (262 lb 2 oz)   SpO2 93%     Physical Exam  Vitals reviewed.   HENT:      Head: Normocephalic and atraumatic.   Cardiovascular:      Rate and Rhythm: Normal rate and regular rhythm.      Heart sounds: No murmur heard.  Pulmonary:      Effort: Pulmonary effort is normal.      Breath sounds: Rales present. No wheezing.   Abdominal:      General: There is no distension.      Palpations: Abdomen is soft.   Musculoskeletal:      Right lower leg: Edema present.      Left lower leg: Edema present.      Comments: 1+ pitting edema bilaterally   Skin:     General: Skin is warm and dry.   Neurological:      Mental Status: He is alert.   Psychiatric:         Judgment: Judgment normal.           Labs (personally reviewed):     Lab Results   Component Value Date/Time    SODIUM 133 (L) 05/29/2023 12:34 AM    POTASSIUM 4.3 05/29/2023 12:34 AM    CHLORIDE 99 05/29/2023 12:34 AM    CO2 19 (L) 05/29/2023 12:34 AM    GLUCOSE 122 (H) 05/29/2023 12:34 AM    BUN 19 05/29/2023 12:34 AM    CREATININE 0.70 05/29/2023 12:34 AM     Lab Results   Component Value Date/Time    CHOLSTRLTOT 167 05/24/2023 03:30 AM    LDL 83 05/24/2023 03:30 AM    HDL 50 05/24/2023 03:30 AM    TRIGLYCERIDE 171 (H) 05/24/2023 03:30 AM     No results found for: BNPBTYPENAT      Cardiac Imaging and Procedures Review      Telemetry Review  Sinus rhythm/sinus bradycardia      Assessment and Medical Decision Making:  Cardiac Arrest  Inferior STEMI s/p PCI to proximal RCA 5/23/23  - aspirin, prasugrel   - HI statin   - metoprolol SR  - plan for wearable defibrillator for 3mo then repeat TTE for LV systolic function    Acute HFrEF Stage C NYHA III  Ischemic CMO  - continue lasix PO 20mg daily with instructions to double for weight gain of 3lbs, orthopnea  - Metop SR  - losartan " 25mg  - dapa   - spironolactone    PAF post MI  - in AF for less than 48 hours  - has been start on oral amiodarone, EKG today 5/29 shows normal QTc, NSR  - on methadone  - STOP NOAC, continue DAPT    Meds to bed   [place]. His wife has proactively arranged Cardiology appt with local group and he has a follow up with his PCP within 7 days.    Please see Dr Dover's attestation for additions and further recommendations.    Tiff Lovell PA-C  [facility]    I personally spent a total of 20 minutes which includes face-to-face time and non-face-to-face time spent on preparing to see the patient, reviewing hospital notes and tests, obtaining history from the patient, performing a medically appropriate exam, counseling and educating the patient, ordering medications/tests/procedures/referrals as clinically indicated, and documenting information in the electronic medical record.

## 2025-06-25 NOTE — PROGRESS NOTES
Cardiology Progress Note:    Emil Pena M.D.  Date & Time note created:    5/24/2023   3:04 PM     Referring MD:  Dr. Cummings    Patient ID:   Name:             Des Elam     YOB: 1955  Age:                 68 y.o.  male   MRN:               [id number]                                                             Chief Complaint / Reason for consult:  STEMI, cardiac arrest.    History of Present Illness:    This is a 68 years old man without prior cardiac problems, presented to the hospital via EMS after he was found to be in cardiac arrest with ventricular fibrillation, CPR was performed, defibrillation was performed, patient was noted to have ST elevation on inferior leads with reciprocal changes on field EKG.  Patient presented to the hospital with ongoing ventricular arrhythmias.  He was defibrillated twice in the ER.  He was intubated in the ER.  He did have good mentation when he was awake after defibrillation.  I personally interpreted the EKG tracing on field which showed inferior ST elevation with reciprocal changes suggestive of inferior myocardial infarct acute events.  Per his wife's report, he does not have prior history of cardiac problems, no prior cardiac procedure or surgery.  Patient does have hypertension problem.     In the ER, patient goes in and out of ventricular arrhythmias.  He received CPR, lidocaine loading, magnesium loading and defibrillation x2 in the ER.  He was never hypotensive.    He is s/p RCA stent.    LVEF of 25 % with global hypokinesis. No significant valvular disease. I have independently interpreted and reviewed echocardiogram's actual images.     Overnight events:  Extubated. Doing better. Lying flat in bed.    Review of Systems:      Constitutional: Denies fevers, Denies weight changes  Eyes: Denies changes in vision, no eye pain  Ears/Nose/Throat/Mouth: Denies nasal congestion or sore throat   Cardiovascular: yes chest pain, no palpitations  Medication passed protocol.     Medication: sildenafil 100mg passed protocol.   Last office visit date: 5-15-25  Next appointment scheduled?: No;      Upcoming appointment scheduled on: Visit date not found   Per last office visit, patient is to follow up 6 months.   Last refill on: 3-26-25  #10/0    Medication passed protocol.     Medication: levothyroxine 75mcg passed protocol.   Last office visit date: 5-15-25  Next appointment scheduled?: No;      Upcoming appointment scheduled on: Visit date not found   Per last office visit, patient is to follow up 6 months.   Last refill on: 3-26-25  #90/0        Respiratory: no shortness of breath , Denies cough  Gastrointestinal/Hepatic: Denies abdominal pain, nausea, vomiting, diarrhea, constipation or GI bleeding   Genitourinary: Denies dysuria or frequency  Musculoskeletal/Rheum: Denies  joint pain and swelling   Skin: Denies rash  Neurological: Denies headache, confusion, memory loss or focal weakness/parasthesias  Psychiatric: denies mood disorder   Endocrine: Rivka thyroid problems  Heme/Oncology/Lymph Nodes: Denies enlarged lymph nodes, denies brusing or known bleeding disorder  All other systems were reviewed and are negative (AMA/CMS criteria)                Past Medical History:   No past medical history on file.  Active Hospital Problems    Diagnosis     Leukocytosis [D72.829]     Transaminitis [R74.01]     TESS (acute kidney injury) ([facility]) [N17.9]     STEMI (ST elevation myocardial infarction) ([facility]) [I21.3]     VT (ventricular tachycardia) ([facility]) [I47.20]     Acute respiratory failure with hypoxia ([facility]) [J96.01]     Cardiac arrest ([facility]) [I46.9]     Chronic pain [G89.29]     HTN (hypertension) [I10]     Hypercholesteremia [E78.00]     Hypokalemia [E87.6]        Past Surgical History:  No past surgical history on file.    Hospital Medications:    Current Facility-Administered Medications:     oxyCODONE immediate-release (ROXICODONE) tablet 5 mg, 5 mg, Enteral Tube, Q6HRS PRN, Cyn Hobson M.D., 5 mg at 05/24/23 0823    ondansetron (ZOFRAN ODT) dispertab 4 mg, 4 mg, Enteral Tube, Q4HRS PRN, Prosper Cummings Jr., D.O.    methadone (DOLOPHINE) 10 MG/ML solution 10 mg, 10 mg, Oral, 4X/DAY, Tammy Pritchett M.D., 10 mg at 05/24/23 1218    furosemide (LASIX) injection 20 mg, 20 mg, Intravenous, BID DIURETIC, Tammy Pritchett M.D., 20 mg at 05/24/23 1305    Respiratory Therapy Consult, , Nebulization, Continuous RT, Prosper Cummings Jr., D.O.    famotidine (PEPCID) tablet 20 mg, 20 mg, Enteral Tube, Q12HRS, 20 mg at 05/24/23 0525 **OR** famotidine (PEPCID)  injection 20 mg, 20 mg, Intravenous, Q12HRS, MIRELLA Quarles Jr..O.    senna-docusate (PERICOLACE or SENOKOT S) 8.6-50 MG per tablet 2 Tablet, 2 Tablet, Enteral Tube, BID, 2 Tablet at 05/24/23 0525 **AND** polyethylene glycol/lytes (MIRALAX) PACKET 1 Packet, 1 Packet, Enteral Tube, QDAY PRN **AND** magnesium hydroxide (MILK OF MAGNESIA) suspension 30 mL, 30 mL, Enteral Tube, QDAY PRN **AND** bisacodyl (DULCOLAX) suppository 10 mg, 10 mg, Rectal, QDAY PRN, MIRELLA Quarles Jr..O.    lidocaine (XYLOCAINE) 1 % injection 2 mL, 2 mL, Tracheal Tube, Q30 MIN PRN, MIRELLA Quarles Jr..O.    fentaNYL (SUBLIMAZE) injection 100 mcg, 100 mcg, Intravenous, Q15 MIN PRN, 100 mcg at 05/24/23 0722 **AND** fentaNYL (SUBLIMAZE) injection 200 mcg, 200 mcg, Intravenous, Q15 MIN PRN **AND** fentaNYL (SUBLIMAZE) 50 mcg/mL in 50mL (Continuous Infusion), , Intravenous, Continuous **AND** [DISCONTINUED] propofol (DIPRIVAN) injection, 0-80 mcg/kg/min, Intravenous, Continuous, Stopped at 05/24/23 0645 **AND** [CANCELED] Triglyceride, , , Every 3 Days (0300), MIRELLA Quarles Jr..O.    amiodarone (NEXTERONE) IVPB 150 mg, 150 mg, Intravenous, Once, Justyn Grajeda M.D.    Pharmacy Consult: Enteral tube insertion - review meds/change route/product selection, , Other, PHARMACY TO DOSE, Vito Arriaga M.D.    prasugrel (EFFIENT) tablet 10 mg, 10 mg, Enteral Tube, DAILY, MIRELLA Quarles Jr..O., 10 mg at 05/24/23 0526    aspirin (ASA) chewable tab 81 mg, 81 mg, Enteral Tube, DAILY, MIRELLA Quarles Jr..O., 81 mg at 05/24/23 0525    atorvastatin (LIPITOR) tablet 80 mg, 80 mg, Enteral Tube, Q EVENING, Prosper Cummings Jr., D.O., 80 mg at 05/23/23 2014    enoxaparin (Lovenox) inj 40 mg, 40 mg, Subcutaneous, DAILY AT 1800, Prosper Cummings Jr., D.O., 40 mg at 05/23/23 2035    hydrALAZINE (APRESOLINE) injection 10 mg, 10 mg, Intravenous, Q4HRS PRN, Prosper Cummings Jr., D.O.    ondansetron (ZOFRAN) syringe/vial injection 4 mg, 4 mg,  Intravenous, Q4HRS PRN, Prosper Cummings Jr., D.O.    norepinephrine (Levophed) 8 mg in 250 mL NS infusion (premix), 0-1 mcg/kg/min, Intravenous, Continuous, Prosper Cummings Jr., D.O., Stopped at 05/24/23 1106    Current Outpatient Medications:  Medications Prior to Admission   Medication Sig Dispense Refill Last Dose    atorvastatin (LIPITOR) 20 MG Tab Take 100 mg by mouth every evening.   5/22/2023 at 2100    hydroCHLOROthiazide (HYDRODIURIL) 25 MG Tab Take 25 mg by mouth every day. Unsure on dose frequency   5/23/2023 at 0800    losartan (COZAAR) 50 MG Tab Take 100 mg by mouth every day. Unsure on dose frequency   5/23/2023 at 0800    methadone (DOLOPHINE) 5 MG Tab Take 20 mg by mouth. 20mg in the morning, 25mg in the afternoon for chronic back pain and restless leg syndrome   5/23/2023 at 0800       Medication Allergy:  Allergies   Allergen Reactions    Neomycin-Bacitracin-Polymyxin        Family History:  No family history on file.    Social History:  Social History     Socioeconomic History    Marital status: Not on file     Spouse name: Not on file    Number of children: Not on file    Years of education: Not on file    Highest education level: Not on file   Occupational History    Not on file   Tobacco Use    Smoking status: Not on file    Smokeless tobacco: Not on file   Substance and Sexual Activity    Alcohol use: Not on file    Drug use: Not on file    Sexual activity: Not on file   Other Topics Concern    Not on file   Social History Narrative    Not on file     Social Determinants of Health     Financial Resource Strain: Not on file   Food Insecurity: Not on file   Transportation Needs: Not on file   Physical Activity: Not on file   Stress: Not on file   Social Connections: Not on file   Intimate Partner Violence: Not on file   Housing Stability: Not on file         Physical Exam:  Vitals/ General Appearance:   Weight/BMI: Body mass index is 35.14 kg/m².  BP 91/50   Pulse (!) 54   Temp 36.3 °C (97.4  "°F) (Temporal)   Resp 18   Ht 1.854 m (6' 0.99\")   Wt 121 kg (266 lb 5.1 oz)   SpO2 96%   Vitals:    05/24/23 0945 05/24/23 1000 05/24/23 1100 05/24/23 1200   BP:  100/60 91/50 91/50   Pulse:  (!) 53 (!) 58 (!) 54   Resp:  (!) 11 16 18   Temp:       TempSrc:       SpO2: 98% 98% 96%    Weight:       Height:         Oxygen Therapy:  Pulse Oximetry: 96 %, FiO2%: 30 %, O2 Delivery Device: Nasal Cannula    Constitutional:   Well developed, Well nourished, No acute distress  HENMT:  Normocephalic, Atraumatic, Oropharynx moist mucous membranes, No oral exudates, Nose normal.  No thyromegaly.  Eyes:  EOMI, Conjunctiva normal, No discharge.  Neck:  Normal range of motion, No cervical tenderness,  no JVD.  Cardiovascular:  Normal heart rate, Normal rhythm, No murmurs, No rubs, No gallops.   Extremitites with intact distal pulses, no cyanosis, or edema.  Lungs:  Normal breath sounds, breath sounds clear to auscultation bilaterally,  no rales, no rhonchi, no wheezing.   Abdomen: Bowel sounds normal, Soft, No tenderness, No guarding, No rebound, No masses, No hepatosplenomegaly.  Skin: Warm, Dry, No erythema, No rash, no induration.  Neurologic: Alert & oriented x 3, No focal deficits noted, cranial nerves II through X are intact.  Psychiatric: Affect normal, Judgment normal, Mood normal.      MDM (Data Review):     Records reviewed and summarized in current documentation    Lab Data Review:  Recent Results (from the past 24 hour(s))   Troponin    Collection Time: 05/23/23  4:45 PM   Result Value Ref Range    Troponin T 28 (H) 6 - 19 ng/L   proBrain Natriuretic Peptide, NT    Collection Time: 05/23/23  4:45 PM   Result Value Ref Range    NT-proBNP 344 (H) 0 - 125 pg/mL   CBC With Differential    Collection Time: 05/23/23  4:45 PM   Result Value Ref Range    WBC 23.5 (H) 4.8 - 10.8 K/uL    RBC 4.77 4.70 - 6.10 M/uL    Hemoglobin 15.5 14.0 - 18.0 g/dL    Hematocrit 45.8 42.0 - 52.0 %    MCV 96.0 81.4 - 97.8 fL    MCH 32.5 27.0 " - 33.0 pg    MCHC 33.8 32.3 - 36.5 g/dL    RDW 49.1 35.9 - 50.0 fL    Platelet Count 347 164 - 446 K/uL    MPV 9.4 9.0 - 12.9 fL    Neutrophils-Polys 70.00 44.00 - 72.00 %    Lymphocytes 18.00 (L) 22.00 - 41.00 %    Monocytes 9.00 0.00 - 13.40 %    Eosinophils 0.00 0.00 - 6.90 %    Basophils 0.00 0.00 - 1.80 %    Nucleated RBC 0.10 0.00 - 0.20 /100 WBC    Neutrophils (Absolute) 16.92 (H) 1.82 - 7.42 K/uL    Lymphs (Absolute) 4.23 1.00 - 4.80 K/uL    Monos (Absolute) 2.11 (H) 0.00 - 0.85 K/uL    Eos (Absolute) 0.00 0.00 - 0.51 K/uL    Baso (Absolute) 0.00 0.00 - 0.12 K/uL    NRBC (Absolute) 0.02 K/uL   Comp Metabolic Panel    Collection Time: 05/23/23  4:45 PM   Result Value Ref Range    Sodium 139 135 - 145 mmol/L    Potassium 2.9 (L) 3.6 - 5.5 mmol/L    Chloride 99 96 - 112 mmol/L    Co2 21 20 - 33 mmol/L    Anion Gap 19.0 (H) 7.0 - 16.0    Glucose 162 (H) 65 - 99 mg/dL    Bun 19 8 - 22 mg/dL    Creatinine 0.79 0.50 - 1.40 mg/dL    Calcium 8.9 8.5 - 10.5 mg/dL    AST(SGOT) 50 (H) 12 - 45 U/L    ALT(SGPT) 69 (H) 2 - 50 U/L    Alkaline Phosphatase 67 30 - 99 U/L    Total Bilirubin 0.2 0.1 - 1.5 mg/dL    Albumin 4.2 3.2 - 4.9 g/dL    Total Protein 6.8 6.0 - 8.2 g/dL    Globulin 2.6 1.9 - 3.5 g/dL    A-G Ratio 1.6 g/dL   Lipase    Collection Time: 05/23/23  4:45 PM   Result Value Ref Range    Lipase 27 11 - 82 U/L   Prothrombin Time    Collection Time: 05/23/23  4:45 PM   Result Value Ref Range    PT 13.6 12.0 - 14.6 sec    INR 1.05 0.87 - 1.13   aPTT    Collection Time: 05/23/23  4:45 PM   Result Value Ref Range    APTT 28.1 24.7 - 36.0 sec   DIFFERENTIAL MANUAL    Collection Time: 05/23/23  4:45 PM   Result Value Ref Range    Bands-Stabs 2.00 0.00 - 10.00 %    Myelocytes 1.00 %    Manual Diff Status PERFORMED    PERIPHERAL SMEAR REVIEW    Collection Time: 05/23/23  4:45 PM   Result Value Ref Range    Peripheral Smear Review see below    PLATELET ESTIMATE    Collection Time: 05/23/23  4:45 PM   Result Value Ref Range  muscle (48130)     Aquatic Therex (35415)    Dry Needle 3+ muscle (54140)     Iontophoresis (60972)    VASO (56490) 1    Ultrasound (27822)    Group Therapy (31422)     Estim Attended (89646)    Canalith Repositioning (06859)     Other:    Other:    Total Timed Code Tx Minutes 50'        Total Treatment Minutes 3:00-4:55  115'        Charge Justification:  (12510) THERAPEUTIC EXERCISE - Provided verbal/tactile cueing for activities related to strengthening, flexibility, endurance, ROM performed to prevent loss of range of motion, maintain or improve muscular strength or increase flexibility, following either an injury or surgery.   (74112) HOME EXERCISE PROGRAM - Reviewed/Progressed HEP activities related to strengthening, flexibility, endurance, ROM performed to prevent loss of range of motion, maintain or improve muscular strength or increase flexibility, following either an injury or surgery.  (95487) NEUROMUSCULAR RE-EDUCATION - Therapeutic procedure, 1 or more areas, each 15 minutes; neuromuscular reeducation of movement, balance, coordination, kinesthetic sense, posture, and/or proprioception for sitting and/or standing activities  (19020) HOME EXERCISE PROGRAM - Reviewed/Progressed HEP activities related to neuromuscular reeducation of movement, balance, coordination, kinesthetic sense, posture, and/or proprioception for sitting and/or standing activities    (66125) VASOPNEUMATIC    TREATMENT PLAN   Plan: Cont POC- Continue emphasis/focus on exercise progression, improving proper muscle recruitment and activation/motor control patterns, modulating pain, increasing ROM, and reduce/eliminate soft tissue swelling/inflammation/restriction. Next visit plan to progress weights, progress reps, and add new exercises   Consider biofeedback NPV    Electronically Signed by Jerson Cullen PT              Date: 02/20/2024     Note: If patient does not return for scheduled/recommended follow up visits, this note will serve     Plt Estimation Normal    MORPHOLOGY    Collection Time: 23  4:45 PM   Result Value Ref Range    RBC Morphology Normal    ESTIMATED GFR    Collection Time: 23  4:45 PM   Result Value Ref Range    GFR (CKD-EPI) 96 >60 mL/min/1.73 m 2   CORRECTED CALCIUM    Collection Time: 23  4:45 PM   Result Value Ref Range    Correct Calcium 8.7 8.5 - 10.5 mg/dL   POCT activated clotting time device results    Collection Time: 23  5:37 PM   Result Value Ref Range    Istat Activated Clotting Time 305 (H) 74 - 137 sec   POCT activated clotting time device results    Collection Time: 23  5:49 PM   Result Value Ref Range    Istat Activated Clotting Time 203 (H) 74 - 137 sec   POCT arterial blood gas device results    Collection Time: 23  6:53 PM   Result Value Ref Range    Ph 7.297 (LL) 7.400 - 7.500    Pco2 54.8 (HH) 26.0 - 37.0 mmHg    Po2 216 (H) 64 - 87 mmHg    Tco2 28 20 - 33 mmol/L    S02 100 (H) 93 - 99 %    Hco3 26.8 (H) 17.0 - 25.0 mmol/L    BE -1 -4 - 3 mmol/L    Body Temp 97.1 F degrees    O2 Therapy 60 %    iPF Ratio 360     Ph Temp Woody 7.309 (L) 7.400 - 7.500    Pco2 Temp Co 52.8 (HH) 26.0 - 37.0 mmHg    Po2 Temp Cor 212 (H) 64 - 87 mmHg    Specimen Arterial     DelSys Vent     End Tidal Carbon Dioxide 34 mmhg    Tidal Volume 470 mL    Peep End Expiratory Pressure 8 cmh20    Set Rate 24     Mode APV-CMV    EKG    Collection Time: 23  7:12 PM   Result Value Ref Range    Report       [facility]    Test Date:  2023  Pt Name:    KAL PRETTY                  Department:   MRN:        [id number]                      Room:       T611  Gender:     Male                         Technician: [facility]  :        1955                   Requested By:ALANA BOYER  Order #:    [id number]                    Reading MD:    Measurements  Intervals                                Axis  Rate:       96                           P:  TX:                                      QRS:         47  QRSD:       99                           T:          77  QT:         351  QTc:        444    Interpretive Statements  Atrial fibrillation  Inferior infarct, acute (RCA)  Probable RV involvement, suggest recording right precordial leads  No previous ECG available for comparison     EKG - STAT Once    Collection Time: 23  8:09 PM   Result Value Ref Range    Report       [facility]    Test Date:  2023  Pt Name:    KAL PRETTY                  Department: 161  MRN:        [id number]                      Room:       T611  Gender:     Male                         Technician: KERRI  :        1955                   Requested By:SHELBY LUGO JR  Order #:    [id number]                    Reading MD:    Measurements  Intervals                                Axis  Rate:       84                           P:          50  FL:         184                          QRS:        13  QRSD:       106                          T:          144  QT:         341  QTc:        404    Interpretive Statements  Sinus rhythm  Supraventricular bigeminy  Inferior infarct, acute (RCA)  Probable anterior infarct, age indeterminate  Probable RV involvement, suggest recording right precordial leads  Compared to ECG 2023 19:12:03  Atrial premature complex(es) now present  Atrial fibrillation no longer present  Myocardial infarct finding still present     HEMOGLOBIN A1C    Collection Time: 23  8:30 PM   Result Value Ref Range    Glycohemoglobin 5.5 4.0 - 5.6 %    Est Avg Glucose 111 mg/dL   MAGNESIUM    Collection Time: 23  8:30 PM   Result Value Ref Range    Magnesium 2.3 1.5 - 2.5 mg/dL   PHOSPHORUS    Collection Time: 23  8:30 PM   Result Value Ref Range    Phosphorus 5.0 (H) 2.5 - 4.5 mg/dL   Basic Metabolic Panel    Collection Time: 23  8:30 PM   Result Value Ref Range    Sodium 140 135 - 145 mmol/L    Potassium 3.3 (L) 3.6 - 5.5 mmol/L    Chloride 101 96 - 112 mmol/L    Co2 24 20 - 33 mmol/L     Glucose 143 (H) 65 - 99 mg/dL    Bun 20 8 - 22 mg/dL    Creatinine 0.78 0.50 - 1.40 mg/dL    Calcium 8.4 (L) 8.5 - 10.5 mg/dL    Anion Gap 15.0 7.0 - 16.0   ESTIMATED GFR    Collection Time: 23  8:30 PM   Result Value Ref Range    GFR (CKD-EPI) 96 >60 mL/min/1.73 m 2   POCT glucose device results    Collection Time: 23  8:36 PM   Result Value Ref Range    POC Glucose, Blood 145 (H) 65 - 99 mg/dL   POCT arterial blood gas device results    Collection Time: 23 10:27 PM   Result Value Ref Range    Ph 7.476 7.400 - 7.500    Pco2 31.4 26.0 - 37.0 mmHg    Po2 118 (H) 64 - 87 mmHg    Tco2 24 20 - 33 mmol/L    S02 99 93 - 99 %    Hco3 23.1 17.0 - 25.0 mmol/L    BE 0 -4 - 3 mmol/L    Body Temp 96.8 F degrees    O2 Therapy 40 %    iPF Ratio 295     Ph Temp Woody 7.491 7.400 - 7.500    Pco2 Temp Co 30.0 26.0 - 37.0 mmHg    Po2 Temp Cor 112 (H) 64 - 87 mmHg    Specimen Arterial     DelSys Vent     End Tidal Carbon Dioxide 25 mmhg    Tidal Volume 470 mL    Peep End Expiratory Pressure 8 cmh20    Set Rate 28     Mode APV-CMV    EKG in four (4) hours    Collection Time: 23 12:30 AM   Result Value Ref Range    Report       [facility]    Test Date:  2023  Pt Name:    KAL PRETTY                  Department: 161  MRN:        [id number]                      Room:       T611  Gender:     Male                         Technician: DGLEORA  :        1955                   Requested By:SHELBY LUGO JR  Order #:    [id number]                    Reading MD:    Measurements  Intervals                                Axis  Rate:       87                           P:          44  NC:         206                          QRS:        56  QRSD:       89                           T:          24  QT:         410  QTc:        494    Interpretive Statements  Sinus rhythm  Multiform ventricular premature complexes  Inferior infarct, old  Compared to ECG 2023 20:09:56  Ventricular premature complex(es) now  present  Atrial premature complex(es) no longer present  Myocardial infarct finding still present     POCT glucose device results    Collection Time: 05/24/23 12:32 AM   Result Value Ref Range    POC Glucose, Blood 130 (H) 65 - 99 mg/dL   POCT arterial blood gas device results    Collection Time: 05/24/23  3:05 AM   Result Value Ref Range    Ph 7.462 7.400 - 7.500    Pco2 31.9 26.0 - 37.0 mmHg    Po2 83 64 - 87 mmHg    Tco2 24 20 - 33 mmol/L    S02 97 93 - 99 %    Hco3 22.8 17.0 - 25.0 mmol/L    BE 0 -4 - 3 mmol/L    Body Temp 97.8 F degrees    O2 Therapy 30 %    iPF Ratio 277     Ph Temp Woody 7.468 7.400 - 7.500    Pco2 Temp Co 31.3 26.0 - 37.0 mmHg    Po2 Temp Cor 81 64 - 87 mmHg    Specimen Arterial     DelSys Vent     End Tidal Carbon Dioxide 25 mmhg    Tidal Volume 470 mL    Peep End Expiratory Pressure 8 cmh20    Set Rate 26     Mode APV-CMV    Urine Drug Screen    Collection Time: 05/24/23  3:30 AM   Result Value Ref Range    Amphetamines Urine Negative Negative    Barbiturates Negative Negative    Benzodiazepines Negative Negative    Cocaine Metabolite Negative Negative    Fentanyl, Urine Positive (A) Negative    Methadone Positive (A) Negative    Opiates Negative Negative    Oxycodone Negative Negative    Phencyclidine -Pcp Negative Negative    Propoxyphene Negative Negative    Cannabinoid Metab Negative Negative   CBC with Differential    Collection Time: 05/24/23  3:30 AM   Result Value Ref Range    WBC 22.1 (H) 4.8 - 10.8 K/uL    RBC 4.22 (L) 4.70 - 6.10 M/uL    Hemoglobin 13.7 (L) 14.0 - 18.0 g/dL    Hematocrit 40.4 (L) 42.0 - 52.0 %    MCV 95.7 81.4 - 97.8 fL    MCH 32.5 27.0 - 33.0 pg    MCHC 33.9 32.3 - 36.5 g/dL    RDW 49.8 35.9 - 50.0 fL    Platelet Count 379 164 - 446 K/uL    MPV 9.8 9.0 - 12.9 fL    Neutrophils-Polys 67.90 44.00 - 72.00 %    Lymphocytes 23.50 22.00 - 41.00 %    Monocytes 6.50 0.00 - 13.40 %    Eosinophils 0.30 0.00 - 6.90 %    Basophils 0.30 0.00 - 1.80 %    Immature Granulocytes  1.50 (H) 0.00 - 0.90 %    Nucleated RBC 0.00 0.00 - 0.20 /100 WBC    Neutrophils (Absolute) 15.01 (H) 1.82 - 7.42 K/uL    Lymphs (Absolute) 5.21 (H) 1.00 - 4.80 K/uL    Monos (Absolute) 1.44 (H) 0.00 - 0.85 K/uL    Eos (Absolute) 0.07 0.00 - 0.51 K/uL    Baso (Absolute) 0.06 0.00 - 0.12 K/uL    Immature Granulocytes (abs) 0.34 (H) 0.00 - 0.11 K/uL    NRBC (Absolute) 0.00 K/uL   Basic Metabolic Panel (BMP)    Collection Time: 05/24/23  3:30 AM   Result Value Ref Range    Sodium 136 135 - 145 mmol/L    Potassium 3.9 3.6 - 5.5 mmol/L    Chloride 99 96 - 112 mmol/L    Co2 23 20 - 33 mmol/L    Glucose 155 (H) 65 - 99 mg/dL    Bun 26 (H) 8 - 22 mg/dL    Creatinine 1.24 0.50 - 1.40 mg/dL    Calcium 8.3 (L) 8.5 - 10.5 mg/dL    Anion Gap 14.0 7.0 - 16.0   Magnesium    Collection Time: 05/24/23  3:30 AM   Result Value Ref Range    Magnesium 2.4 1.5 - 2.5 mg/dL   Phosphorus    Collection Time: 05/24/23  3:30 AM   Result Value Ref Range    Phosphorus 4.8 (H) 2.5 - 4.5 mg/dL   Lipid Profile    Collection Time: 05/24/23  3:30 AM   Result Value Ref Range    Cholesterol,Tot 167 100 - 199 mg/dL    Triglycerides 171 (H) 0 - 149 mg/dL    HDL 50 >=40 mg/dL    LDL 83 <100 mg/dL   ESTIMATED GFR    Collection Time: 05/24/23  3:30 AM   Result Value Ref Range    GFR (CKD-EPI) 63 >60 mL/min/1.73 m 2   Comp Metabolic Panel    Collection Time: 05/24/23  3:30 AM   Result Value Ref Range    Sodium 139 135 - 145 mmol/L    Potassium 4.2 3.6 - 5.5 mmol/L    Chloride 101 96 - 112 mmol/L    Co2 22 20 - 33 mmol/L    Anion Gap 16.0 7.0 - 16.0    Glucose 153 (H) 65 - 99 mg/dL    Bun 26 (H) 8 - 22 mg/dL    Creatinine 1.37 0.50 - 1.40 mg/dL    Calcium 8.6 8.5 - 10.5 mg/dL    AST(SGOT) 739 (H) 12 - 45 U/L    ALT(SGPT) 266 (H) 2 - 50 U/L    Alkaline Phosphatase 59 30 - 99 U/L    Total Bilirubin 0.5 0.1 - 1.5 mg/dL    Albumin 3.5 3.2 - 4.9 g/dL    Total Protein 5.6 (L) 6.0 - 8.2 g/dL    Globulin 2.1 1.9 - 3.5 g/dL    A-G Ratio 1.7 g/dL   CREATINE KINASE     Collection Time: 23  3:30 AM   Result Value Ref Range    CPK Total 7065 (HH) 0 - 154 U/L   CORRECTED CALCIUM    Collection Time: 23  3:30 AM   Result Value Ref Range    Correct Calcium 9.0 8.5 - 10.5 mg/dL   ESTIMATED GFR    Collection Time: 23  3:30 AM   Result Value Ref Range    GFR (CKD-EPI) 56 (A) >60 mL/min/1.73 m 2   POCT glucose device results    Collection Time: 23  5:34 AM   Result Value Ref Range    POC Glucose, Blood 95 65 - 99 mg/dL   EKG    Collection Time: 23  7:21 AM   Result Value Ref Range    Report       [facility]    Test Date:  2023  Pt Name:    KAL PRETTY                  Department: 161  MRN:        [id number]                      Room:       11  Gender:     Male                         Technician: [facility]  :        1955                   Requested By:OSMANY COOLEY  Order #:    [id number]                    Reading MD:    Measurements  Intervals                                Axis  Rate:       77                           P:          -12  AK:         184                          QRS:        7  QRSD:       99                           T:          106  QT:         391  QTc:        443    Interpretive Statements  Sinus bradycardia  Atrial premature complexes in couplets  Inferior infarct, acute (RCA)  Probable RV involvement, suggest recording right precordial leads  Compared to ECG 2023 00:30:54  Atrial premature complex(es) now present  Sinus rhythm no longer present  Ventricular premature complex(es) no longer present  Myocardial infarct finding still p resent     EKG STAT    Collection Time: 23  7:21 AM   Result Value Ref Range    Report       [facility]    Test Date:  2023  Pt Name:    KAL PRETTY                  Department: 161  MRN:        [id number]                      Room:       T611  Gender:     Male                         Technician: [facility]  :        1955                   Requested By:MARYBEL IGNACIO  BLAIR  Order #:    [id number]                    Reading MD:    Measurements  Intervals                                Axis  Rate:       77                           P:          -12  MT:         184                          QRS:        7  QRSD:       99                           T:          106  QT:         391  QTc:        443    Interpretive Statements  Sinus bradycardia  Atrial premature complexes in couplets  Inferior infarct, acute (RCA)  Probable RV involvement, suggest recording right precordial leads  Compared to ECG 05/24/2023 00:30:54  Atrial premature complex(es) now present  Sinus rhythm no longer present  Ventricular premature complex(es) no longer present  Myocardial infarct finding still pres ent     POCT glucose device results    Collection Time: 05/24/23 12:19 PM   Result Value Ref Range    POC Glucose, Blood 77 65 - 99 mg/dL   EC-ECHOCARDIOGRAM COMPLETE W/ CONT    Collection Time: 05/24/23  1:15 PM   Result Value Ref Range    Eject.Frac. MOD BP 41.36     Eject.Frac. MOD 4C 44.14     Eject.Frac. MOD 2C 40.92     Left Ventrical Ejection Fraction 25        Imaging/Procedures Review:    Chest Xray:  Reviewed    EKG:   As in HPI.     MDM (Assessment and Plan):     Active Hospital Problems    Diagnosis     Leukocytosis [D72.829]     Transaminitis [R74.01]     TESS (acute kidney injury) ([facility]) [N17.9]     STEMI (ST elevation myocardial infarction) ([facility]) [I21.3]     VT (ventricular tachycardia) ([facility]) [I47.20]     Acute respiratory failure with hypoxia ([facility]) [J96.01]     Cardiac arrest ([facility]) [I46.9]     Chronic pain [G89.29]     HTN (hypertension) [I10]     Hypercholesteremia [E78.00]     Hypokalemia [E87.6]          Ischemic Cardiomyopathy:  Chronically illed condition which requires ongoing close monitoring and treatment to improve survival rate along with decreasing risk of clinical decompensation sudden cardiac death and hospitalization.    Today, based on physical examination findings, patient is  euvolemic. No JVD, lungs are clear to auscultation, no pitting edema in bilateral lower extremities, no ascites.     Dry weight is 268 lbs.    Will start Toprol XL 25 mg daily.     Will add losartan 25 mg daily.     Diuretic with Furosemide 40 mg IV daily.     Will add aldactone antagonist once ARB and BB therapy are optimized.    Based on recent data on SGLT2 and heart failure with reduced ejection fraction, patient will be benefited from Farxiga 10 mg p.o. once a day for further reduction in mortality and hospitalization with absolute risk reduction of 4.9%.  Therefore, I will start patient on Farxiga 10 mg p.o. once a day.  Risks and benefits were explained to patient and patient has agreed to proceed.      We will consider ICD placement for primary prevention in 3 months if left ventricular systolic function remains less than 35% after optimization of evidence based heart failure medical regimen.    Coronary arterial disease s/p RCA stent:  Continue Prasugrel, asa, BB, ARB  and statin at current dose.    Hypertension:  Optimize control using cardiomyopathy medical regimen as well.    Hyperlipidemia:  Optimize statin as within guidelines of CAD treatment as above.       Of note, during the care of this patient, I spent a significant amount of time explaining the nature of the disease process, reviewing all possible imaging studies, blood test results to patient.  The overall care of this patient require a higher level of care than usual due to the multiple comorbidities along with ongoing issues of congestive heart failure that put this patient at risk for sudden cardiac death, increased mortality, and hospitalization.  This patient also requires close monitoring with at least monthly blood test to monitor renal function and electrolytes due to the ongoing dynamic changes of medical therapy titration protocol to ensure optimal benefits for the overall care of this patient.        Thank you for referring this  patient to our cardiology service.  We will follow patient with you.      Emil Pena MD.   Cardiology Inpatient Service.  [facility].  [phone].  Bibiana Matt.